# Patient Record
Sex: FEMALE | Race: BLACK OR AFRICAN AMERICAN | NOT HISPANIC OR LATINO | Employment: OTHER | URBAN - METROPOLITAN AREA
[De-identification: names, ages, dates, MRNs, and addresses within clinical notes are randomized per-mention and may not be internally consistent; named-entity substitution may affect disease eponyms.]

---

## 2017-01-19 ENCOUNTER — OUTSIDE FACILITY SERVICE (OUTPATIENT)
Dept: FAMILY MEDICINE CLINIC | Facility: CLINIC | Age: 57
End: 2017-01-19

## 2017-01-19 PROCEDURE — 99307 SBSQ NF CARE SF MDM 10: CPT | Performed by: FAMILY MEDICINE

## 2017-02-14 RX ORDER — HYDROCODONE BITARTRATE AND ACETAMINOPHEN 7.5; 325 MG/1; MG/1
TABLET ORAL
Qty: 180 TABLET | Refills: 0 | Status: SHIPPED | OUTPATIENT
Start: 2017-02-14 | End: 2017-10-02 | Stop reason: DRUGHIGH

## 2017-02-14 RX ORDER — LORAZEPAM 0.5 MG/1
0.5 TABLET ORAL NIGHTLY
Qty: 30 TABLET | Refills: 0 | Status: SHIPPED | OUTPATIENT
Start: 2017-02-14 | End: 2017-04-18 | Stop reason: SDUPTHER

## 2017-03-01 ENCOUNTER — OUTSIDE FACILITY SERVICE (OUTPATIENT)
Dept: FAMILY MEDICINE CLINIC | Facility: CLINIC | Age: 57
End: 2017-03-01

## 2017-03-01 PROCEDURE — 99305 1ST NF CARE MODERATE MDM 35: CPT | Performed by: NURSE PRACTITIONER

## 2017-03-17 ENCOUNTER — OUTSIDE FACILITY SERVICE (OUTPATIENT)
Dept: FAMILY MEDICINE CLINIC | Facility: CLINIC | Age: 57
End: 2017-03-17

## 2017-03-17 PROCEDURE — 99307 SBSQ NF CARE SF MDM 10: CPT | Performed by: NURSE PRACTITIONER

## 2017-04-18 RX ORDER — LORAZEPAM 0.5 MG/1
0.5 TABLET ORAL NIGHTLY
Qty: 30 TABLET | Refills: 1 | Status: SHIPPED | OUTPATIENT
Start: 2017-04-18 | End: 2017-05-09 | Stop reason: SDUPTHER

## 2017-04-20 ENCOUNTER — OUTSIDE FACILITY SERVICE (OUTPATIENT)
Dept: FAMILY MEDICINE CLINIC | Facility: CLINIC | Age: 57
End: 2017-04-20

## 2017-04-20 PROCEDURE — 99308 SBSQ NF CARE LOW MDM 20: CPT | Performed by: FAMILY MEDICINE

## 2017-05-09 RX ORDER — LORAZEPAM 0.5 MG/1
0.5 TABLET ORAL NIGHTLY
Qty: 60 TABLET | Refills: 0 | Status: SHIPPED | OUTPATIENT
Start: 2017-05-09 | End: 2017-05-12 | Stop reason: SDUPTHER

## 2017-05-12 RX ORDER — LORAZEPAM 0.5 MG/1
0.5 TABLET ORAL NIGHTLY
Qty: 30 TABLET | Refills: 5 | Status: SHIPPED | OUTPATIENT
Start: 2017-05-12 | End: 2018-12-26

## 2017-06-16 ENCOUNTER — OUTSIDE FACILITY SERVICE (OUTPATIENT)
Dept: FAMILY MEDICINE CLINIC | Facility: CLINIC | Age: 57
End: 2017-06-16

## 2017-06-16 PROCEDURE — 99309 SBSQ NF CARE MODERATE MDM 30: CPT | Performed by: NURSE PRACTITIONER

## 2017-08-16 ENCOUNTER — OUTSIDE FACILITY SERVICE (OUTPATIENT)
Dept: FAMILY MEDICINE CLINIC | Facility: CLINIC | Age: 57
End: 2017-08-16

## 2017-08-16 PROCEDURE — 99308 SBSQ NF CARE LOW MDM 20: CPT | Performed by: NURSE PRACTITIONER

## 2017-08-18 ENCOUNTER — OUTSIDE FACILITY SERVICE (OUTPATIENT)
Dept: FAMILY MEDICINE CLINIC | Facility: CLINIC | Age: 57
End: 2017-08-18

## 2017-08-18 PROCEDURE — 99308 SBSQ NF CARE LOW MDM 20: CPT | Performed by: NURSE PRACTITIONER

## 2017-09-21 ENCOUNTER — OUTSIDE FACILITY SERVICE (OUTPATIENT)
Dept: FAMILY MEDICINE CLINIC | Facility: CLINIC | Age: 57
End: 2017-09-21

## 2017-09-21 PROCEDURE — 99307 SBSQ NF CARE SF MDM 10: CPT | Performed by: FAMILY MEDICINE

## 2017-10-01 ENCOUNTER — HOSPITAL ENCOUNTER (EMERGENCY)
Facility: HOSPITAL | Age: 57
Discharge: NURSING, REHAB OR LONG-TERM CARE | End: 2017-10-01
Attending: EMERGENCY MEDICINE | Admitting: EMERGENCY MEDICINE

## 2017-10-01 VITALS
BODY MASS INDEX: 28.63 KG/M2 | DIASTOLIC BLOOD PRESSURE: 73 MMHG | SYSTOLIC BLOOD PRESSURE: 110 MMHG | OXYGEN SATURATION: 97 % | TEMPERATURE: 98.4 F | HEIGHT: 70 IN | HEART RATE: 88 BPM | WEIGHT: 200 LBS | RESPIRATION RATE: 15 BRPM

## 2017-10-01 DIAGNOSIS — K59.00 CONSTIPATION, UNSPECIFIED CONSTIPATION TYPE: ICD-10-CM

## 2017-10-01 DIAGNOSIS — K62.89 RECTAL PAIN: Primary | ICD-10-CM

## 2017-10-01 LAB — GLUCOSE BLDC GLUCOMTR-MCNC: 259 MG/DL (ref 70–130)

## 2017-10-01 PROCEDURE — 99283 EMERGENCY DEPT VISIT LOW MDM: CPT

## 2017-10-01 PROCEDURE — 82962 GLUCOSE BLOOD TEST: CPT

## 2017-10-01 NOTE — ED NOTES
"Pt to Room 12 via [facility] from [facility] with c/o \"rectal pain.\"  Per the patient, she does not have any complaints at this time.   When report was received from the facility, they advised the patient had received a soapsuds enema and had three bowel movements.  PT states that she is not in pain at this time, nor does she have any complaints.  Per EMS, patient is at her baseline and does have h/s of CVA.  Pt is alert and oriented X4, PERRLA, respirations are even and unlabored, chest rise and fall is equal in expansion.  Pt does not appear to be in distress at this time.  Pt denies fever, chills, n/v/d, blurred vision, chest pain, abdominal pain, loss in control of bowel/bladder.  Bed in low position, call light within reach, side rails up X2.      Kenia Ramirez RN  10/01/17 0623    "

## 2017-10-01 NOTE — ED NOTES
"Pt repositioned in bed to provide for comfort.  Pt tolerated well.  Patients daughter is at bedside.  Pt asked if she is having pain and/or discomfort at this time, patient stated \"No, not really at all.\"       Kenia Ramirez RN  10/01/17 0704    "

## 2017-10-01 NOTE — ED PROVIDER NOTES
EMERGENCY DEPARTMENT ENCOUNTER    CHIEF COMPLAINT  Chief Complaint: rectal pain  History given by: patient, family  History limited by: nothing  Room Number: 12/12  PMD: Merrill Odonnell MD      HPI:  Pt is a 56 y.o. female who presents complaining of rectal pain, which began earlier today. Pt states that her pain is resolved at this time after having several bowel movements after receiving an enema PTA. Pt's family states that the pt was complaining of pain prior to me entering the room. Pt denies recent illness, abd pain, CP or additional complaint at this time.    Duration:  Several hours  Onset: gradual  Timing: constant  Location: rectal  Radiation: none  Quality: unspecified  Intensity/Severity: moderate  Progression: resolved  Associated Symptoms: constipation  Aggravating Factors: none  Alleviating Factors: enema  Previous Episodes: Pt denies similar symptoms previously.  Treatment before arrival: Pt received an enema PTA, which she states resolved her pain.    PAST MEDICAL HISTORY  Active Ambulatory Problems     Diagnosis Date Noted   • No Active Ambulatory Problems     Resolved Ambulatory Problems     Diagnosis Date Noted   • No Resolved Ambulatory Problems     Past Medical History:   Diagnosis Date   • Acid reflux    • Anemia    • Anxiety    • Chronic constipation    • Chronic pain    • Coronary artery disease    • CVA (cerebral vascular accident) 2011, 2012   • Depression    • Diabetes mellitus    • Dyslipidemia    • Dysphagia    • ESRD (end stage renal disease)    • Flexion contractures    • Hard to intubate    • History of esophagitis    • Hypertension    • Nausea & vomiting    • Venous insufficiency (chronic) (peripheral)        PAST SURGICAL HISTORY  Past Surgical History:   Procedure Laterality Date   • ARTERIOVENOUS FISTULA/SHUNT SURGERY Right 3/21/2016    Procedure: RT BRACHIAL CEPHALIC FISTULA;  Surgeon: Alla Price Jr., MD;  Location: [facility];  Service:    • BREAST BIOPSY          FAMILY HISTORY  History reviewed. No pertinent family history.    SOCIAL HISTORY  Social History     Social History   • Marital status:      Spouse name: N/A   • Number of children: N/A   • Years of education: N/A     Occupational History   • Not on file.     Social History Main Topics   • Smoking status: Former Smoker   • Smokeless tobacco: Former User   • Alcohol use No   • Drug use: No   • Sexual activity: Defer     Other Topics Concern   • Not on file     Social History Narrative       ALLERGIES  Darvon [propoxyphene]    REVIEW OF SYSTEMS  Review of Systems   Constitutional: Negative for fever.   Respiratory: Negative for shortness of breath.    Cardiovascular: Negative for chest pain.   Gastrointestinal: Positive for constipation.        Rectal pain       PHYSICAL EXAM  ED Triage Vitals   Temp Heart Rate Resp BP SpO2   10/01/17 0600 10/01/17 0600 10/01/17 0600 10/01/17 0600 10/01/17 0600   98.4 °F (36.9 °C) 110 18 122/79 98 %      Temp src Heart Rate Source Patient Position BP Location FiO2 (%)   10/01/17 0600 -- -- -- --   Tympanic           Physical Exam   Constitutional: No distress.   HENT:   Head: Normocephalic and atraumatic.   Eyes: Conjunctivae and EOM are normal. Pupils are equal, round, and reactive to light.   Neck: Normal range of motion. Neck supple.   Cardiovascular: Normal rate, regular rhythm and normal heart sounds.    Pulmonary/Chest: Effort normal and breath sounds normal. No respiratory distress.   Abdominal: Soft. There is no tenderness. There is no rebound and no guarding.   Musculoskeletal: Normal range of motion. She exhibits no edema.   AV fistula in RUE with a palpable thrill   Neurological: She is alert. She displays weakness (left hemiparesis, chronic).   Chronic contracture of LUE   Skin: Skin is warm and dry. No rash noted.   Nursing note and vitals reviewed.      PROCEDURES  Procedures      PROGRESS AND CONSULTS  ED Course     0707- D/w pt and family that since the  pt's pain is now resolved, there is no further testing necessary. Discussed the plan to discharge the pt back to the NH. Pt and family agree with the plan and all questions were addressed.      MEDICAL DECISION MAKING  Results were reviewed/discussed with the patient and they were also made aware of online access. Pt also made aware that follow up with PMD is necessary.     MDM  Number of Diagnoses or Management Options     Amount and/or Complexity of Data Reviewed  Decide to obtain previous medical records or to obtain history from someone other than the patient: yes  Obtain history from someone other than the patient: yes (family)  Review and summarize past medical records: yes (Pt has no recent ED visits )    Patient Progress  Patient progress: improved         DIAGNOSIS  Final diagnoses:   Rectal pain, resolved   Constipation, unspecified constipation type, resolved       DISPOSITION  DISCHARGE    Patient discharged in stable condition.    Reviewed implications of results, diagnosis, meds, responsibility to follow up, warning signs and symptoms of possible worsening, potential complications and reasons to return to ER, including fever, worsening pain or any concern.    Patient/Family voiced understanding of above instructions.    Discussed plan for discharge, as there is no emergent indication for admission.  Pt/family is agreeable and understands need for follow up and repeat testing.  Pt is aware that discharge does not mean that nothing is wrong but it indicates no emergency is present that requires admission and they must continue care with follow-up as given below or physician of their choice.     FOLLOW-UP  Merrill Odonnell MD  [address]  [address] [address] [phone]    Call  As needed, If symptoms worsen         Medication List      Notice     No changes were made to your prescriptions during this visit.            Latest Documented Vital Signs:  As of 7:29 AM  BP- 122/79 HR- 110  Temp- 98.4 °F (36.9 °C) (Tympanic) O2 sat- 98%    --  Documentation assistance provided by ana Montero for Dr. Valiente.  Information recorded by the ana was done at my direction and has been verified and validated by me.     Georgie Montero  10/01/17 0731       Merrill Valiente MD  10/02/17 1221

## 2017-10-02 ENCOUNTER — EPISODE CHANGES (OUTPATIENT)
Dept: CASE MANAGEMENT | Facility: OTHER | Age: 57
End: 2017-10-02

## 2017-10-02 RX ORDER — HYDROCODONE BITARTRATE AND ACETAMINOPHEN 7.5; 325 MG/1; MG/1
TABLET ORAL
Qty: 270 TABLET | Refills: 0 | Status: SHIPPED | OUTPATIENT
Start: 2017-10-02 | End: 2017-11-27 | Stop reason: SDUPTHER

## 2017-10-02 NOTE — TELEPHONE ENCOUNTER
Katherine at [facility] called, patient of Carolina Womack [birth year] back from hosp needs new orders for Norco 7.5/325mg 1 po q 4 hrs prn ,and 1 po prior to dialysis on Tue, Thurs, Sat routine be sent into [facility]

## 2017-10-03 ENCOUNTER — EPISODE CHANGES (OUTPATIENT)
Dept: CASE MANAGEMENT | Facility: OTHER | Age: 57
End: 2017-10-03

## 2017-10-25 ENCOUNTER — OUTSIDE FACILITY SERVICE (OUTPATIENT)
Dept: FAMILY MEDICINE CLINIC | Facility: CLINIC | Age: 57
End: 2017-10-25

## 2017-10-25 PROCEDURE — 99308 SBSQ NF CARE LOW MDM 20: CPT | Performed by: NURSE PRACTITIONER

## 2017-11-27 RX ORDER — HYDROCODONE BITARTRATE AND ACETAMINOPHEN 7.5; 325 MG/1; MG/1
TABLET ORAL
Qty: 384 TABLET | Refills: 0 | Status: SHIPPED | OUTPATIENT
Start: 2017-11-27 | End: 2018-01-23 | Stop reason: SDUPTHER

## 2017-12-14 ENCOUNTER — OUTSIDE FACILITY SERVICE (OUTPATIENT)
Dept: FAMILY MEDICINE CLINIC | Facility: CLINIC | Age: 57
End: 2017-12-14

## 2017-12-14 PROCEDURE — 99307 SBSQ NF CARE SF MDM 10: CPT | Performed by: FAMILY MEDICINE

## 2018-01-23 RX ORDER — HYDROCODONE BITARTRATE AND ACETAMINOPHEN 7.5; 325 MG/1; MG/1
TABLET ORAL
Qty: 384 TABLET | Refills: 0 | Status: SHIPPED | OUTPATIENT
Start: 2018-01-23 | End: 2018-03-19 | Stop reason: SDUPTHER

## 2018-01-25 ENCOUNTER — OUTSIDE FACILITY SERVICE (OUTPATIENT)
Dept: INTERNAL MEDICINE | Facility: CLINIC | Age: 58
End: 2018-01-25

## 2018-01-25 PROCEDURE — 99308 SBSQ NF CARE LOW MDM 20: CPT | Performed by: NURSE PRACTITIONER

## 2018-02-28 ENCOUNTER — OUTSIDE FACILITY SERVICE (OUTPATIENT)
Dept: INTERNAL MEDICINE | Facility: CLINIC | Age: 58
End: 2018-02-28

## 2018-02-28 PROCEDURE — 99307 SBSQ NF CARE SF MDM 10: CPT | Performed by: FAMILY MEDICINE

## 2018-03-20 RX ORDER — HYDROCODONE BITARTRATE AND ACETAMINOPHEN 7.5; 325 MG/1; MG/1
TABLET ORAL
Qty: 384 TABLET | Refills: 0 | Status: SHIPPED | OUTPATIENT
Start: 2018-03-20 | End: 2018-05-14 | Stop reason: SDUPTHER

## 2018-03-30 ENCOUNTER — EPISODE CHANGES (OUTPATIENT)
Dept: CASE MANAGEMENT | Facility: OTHER | Age: 58
End: 2018-03-30

## 2018-04-06 ENCOUNTER — OUTSIDE FACILITY SERVICE (OUTPATIENT)
Dept: INTERNAL MEDICINE | Facility: CLINIC | Age: 58
End: 2018-04-06

## 2018-04-06 PROCEDURE — 99318 PR E/M ANNUAL NURSING FACILITY ASSESS STABLE 30 MIN: CPT | Performed by: NURSE PRACTITIONER

## 2018-05-09 ENCOUNTER — OUTSIDE FACILITY SERVICE (OUTPATIENT)
Dept: INTERNAL MEDICINE | Facility: CLINIC | Age: 58
End: 2018-05-09

## 2018-05-09 PROCEDURE — 99307 SBSQ NF CARE SF MDM 10: CPT | Performed by: FAMILY MEDICINE

## 2018-05-15 RX ORDER — HYDROCODONE BITARTRATE AND ACETAMINOPHEN 7.5; 325 MG/1; MG/1
TABLET ORAL
Qty: 384 TABLET | Refills: 0 | Status: SHIPPED | OUTPATIENT
Start: 2018-05-15 | End: 2018-07-09 | Stop reason: SDUPTHER

## 2018-06-07 ENCOUNTER — OUTSIDE FACILITY SERVICE (OUTPATIENT)
Dept: INTERNAL MEDICINE | Facility: CLINIC | Age: 58
End: 2018-06-07

## 2018-06-07 PROCEDURE — 99308 SBSQ NF CARE LOW MDM 20: CPT | Performed by: NURSE PRACTITIONER

## 2018-06-15 ENCOUNTER — EPISODE CHANGES (OUTPATIENT)
Dept: CASE MANAGEMENT | Facility: OTHER | Age: 58
End: 2018-06-15

## 2018-07-10 RX ORDER — HYDROCODONE BITARTRATE AND ACETAMINOPHEN 7.5; 325 MG/1; MG/1
TABLET ORAL
Qty: 384 TABLET | Refills: 0 | Status: SHIPPED | OUTPATIENT
Start: 2018-07-10 | End: 2018-09-04 | Stop reason: SDUPTHER

## 2018-07-18 ENCOUNTER — OUTSIDE FACILITY SERVICE (OUTPATIENT)
Dept: INTERNAL MEDICINE | Facility: CLINIC | Age: 58
End: 2018-07-18

## 2018-07-18 PROCEDURE — 99307 SBSQ NF CARE SF MDM 10: CPT | Performed by: FAMILY MEDICINE

## 2018-09-04 RX ORDER — HYDROCODONE BITARTRATE AND ACETAMINOPHEN 7.5; 325 MG/1; MG/1
TABLET ORAL
Qty: 384 TABLET | Refills: 0 | Status: SHIPPED | OUTPATIENT
Start: 2018-09-04 | End: 2018-10-15 | Stop reason: SDUPTHER

## 2018-10-10 ENCOUNTER — OUTSIDE FACILITY SERVICE (OUTPATIENT)
Dept: INTERNAL MEDICINE | Facility: CLINIC | Age: 58
End: 2018-10-10

## 2018-10-10 PROCEDURE — 99307 SBSQ NF CARE SF MDM 10: CPT | Performed by: FAMILY MEDICINE

## 2018-10-11 ENCOUNTER — OUTSIDE FACILITY SERVICE (OUTPATIENT)
Dept: INTERNAL MEDICINE | Facility: CLINIC | Age: 58
End: 2018-10-11

## 2018-10-11 PROCEDURE — 99308 SBSQ NF CARE LOW MDM 20: CPT | Performed by: NURSE PRACTITIONER

## 2018-10-16 RX ORDER — HYDROCODONE BITARTRATE AND ACETAMINOPHEN 7.5; 325 MG/1; MG/1
TABLET ORAL
Qty: 384 TABLET | Refills: 0 | Status: SHIPPED | OUTPATIENT
Start: 2018-10-16 | End: 2018-12-26

## 2018-12-06 ENCOUNTER — OUTSIDE FACILITY SERVICE (OUTPATIENT)
Dept: INTERNAL MEDICINE | Facility: CLINIC | Age: 58
End: 2018-12-06

## 2018-12-06 PROCEDURE — 99309 SBSQ NF CARE MODERATE MDM 30: CPT | Performed by: NURSE PRACTITIONER

## 2018-12-26 ENCOUNTER — APPOINTMENT (OUTPATIENT)
Dept: GENERAL RADIOLOGY | Facility: HOSPITAL | Age: 58
End: 2018-12-26

## 2018-12-26 ENCOUNTER — APPOINTMENT (OUTPATIENT)
Dept: GENERAL RADIOLOGY | Facility: HOSPITAL | Age: 58
End: 2018-12-26
Attending: INTERNAL MEDICINE

## 2018-12-26 ENCOUNTER — APPOINTMENT (OUTPATIENT)
Dept: CT IMAGING | Facility: HOSPITAL | Age: 58
End: 2018-12-26

## 2018-12-26 ENCOUNTER — HOSPITAL ENCOUNTER (INPATIENT)
Facility: HOSPITAL | Age: 58
LOS: 7 days | Discharge: NURSING, REHAB OR LONG-TERM CARE | End: 2019-01-02
Attending: EMERGENCY MEDICINE | Admitting: INTERNAL MEDICINE

## 2018-12-26 DIAGNOSIS — Z99.2 ESRD ON HEMODIALYSIS (HCC): ICD-10-CM

## 2018-12-26 DIAGNOSIS — R41.82 ALTERED MENTAL STATUS, UNSPECIFIED ALTERED MENTAL STATUS TYPE: Primary | ICD-10-CM

## 2018-12-26 DIAGNOSIS — N18.6 ESRD ON HEMODIALYSIS (HCC): ICD-10-CM

## 2018-12-26 PROBLEM — Z74.09 IMMOBILITY: Status: ACTIVE | Noted: 2018-12-26

## 2018-12-26 PROBLEM — L98.429 SACRAL ULCER (HCC): Status: ACTIVE | Noted: 2018-12-26

## 2018-12-26 PROBLEM — E66.01 MORBID OBESITY (HCC): Status: ACTIVE | Noted: 2018-12-26

## 2018-12-26 LAB
AMMONIA BLD-SCNC: 31 UMOL/L (ref 11–51)
ANION GAP SERPL CALCULATED.3IONS-SCNC: 15.5 MMOL/L
BACTERIA UR QL AUTO: ABNORMAL /HPF
BASOPHILS # BLD AUTO: 0.01 10*3/MM3 (ref 0–0.2)
BASOPHILS NFR BLD AUTO: 0.2 % (ref 0–1.5)
BILIRUB UR QL STRIP: NEGATIVE
BUN BLD-MCNC: 38 MG/DL (ref 6–20)
BUN/CREAT SERPL: 8.5 (ref 7–25)
CALCIUM SPEC-SCNC: 8.8 MG/DL (ref 8.6–10.5)
CHLORIDE SERPL-SCNC: 92 MMOL/L (ref 98–107)
CLARITY UR: CLEAR
CO2 SERPL-SCNC: 30.5 MMOL/L (ref 22–29)
COLOR UR: YELLOW
CREAT BLD-MCNC: 4.47 MG/DL (ref 0.57–1)
DEPRECATED RDW RBC AUTO: 55.8 FL (ref 37–54)
EOSINOPHIL # BLD AUTO: 0.05 10*3/MM3 (ref 0–0.7)
EOSINOPHIL NFR BLD AUTO: 0.9 % (ref 0.3–6.2)
ERYTHROCYTE [DISTWIDTH] IN BLOOD BY AUTOMATED COUNT: 17.1 % (ref 11.7–13)
GFR SERPL CREATININE-BSD FRML MDRD: 12 ML/MIN/1.73
GFR SERPL CREATININE-BSD FRML MDRD: ABNORMAL ML/MIN/1.73
GLUCOSE BLD-MCNC: 179 MG/DL (ref 65–99)
GLUCOSE BLDC GLUCOMTR-MCNC: 143 MG/DL (ref 70–130)
GLUCOSE UR STRIP-MCNC: ABNORMAL MG/DL
HCT VFR BLD AUTO: 38.1 % (ref 35.6–45.5)
HGB BLD-MCNC: 12.8 G/DL (ref 11.9–15.5)
HGB UR QL STRIP.AUTO: NEGATIVE
HYALINE CASTS UR QL AUTO: ABNORMAL /LPF
IMM GRANULOCYTES # BLD AUTO: 0.01 10*3/MM3 (ref 0–0.03)
IMM GRANULOCYTES NFR BLD AUTO: 0.2 % (ref 0–0.5)
KETONES UR QL STRIP: ABNORMAL
LEUKOCYTE ESTERASE UR QL STRIP.AUTO: ABNORMAL
LYMPHOCYTES # BLD AUTO: 1.72 10*3/MM3 (ref 0.9–4.8)
LYMPHOCYTES NFR BLD AUTO: 30 % (ref 19.6–45.3)
MCH RBC QN AUTO: 29.8 PG (ref 26.9–32)
MCHC RBC AUTO-ENTMCNC: 33.6 G/DL (ref 32.4–36.3)
MCV RBC AUTO: 88.8 FL (ref 80.5–98.2)
MONOCYTES # BLD AUTO: 0.37 10*3/MM3 (ref 0.2–1.2)
MONOCYTES NFR BLD AUTO: 6.5 % (ref 5–12)
NEUTROPHILS # BLD AUTO: 3.58 10*3/MM3 (ref 1.9–8.1)
NEUTROPHILS NFR BLD AUTO: 62.4 % (ref 42.7–76)
NITRITE UR QL STRIP: NEGATIVE
PH UR STRIP.AUTO: 8 [PH] (ref 5–8)
PHOSPHATE SERPL-MCNC: 3.6 MG/DL (ref 2.5–4.5)
PLATELET # BLD AUTO: 197 10*3/MM3 (ref 140–500)
PMV BLD AUTO: 10.3 FL (ref 6–12)
POTASSIUM BLD-SCNC: 4.1 MMOL/L (ref 3.5–5.2)
PROT UR QL STRIP: ABNORMAL
RBC # BLD AUTO: 4.29 10*6/MM3 (ref 3.9–5.2)
RBC # UR: ABNORMAL /HPF
REF LAB TEST METHOD: ABNORMAL
SODIUM BLD-SCNC: 138 MMOL/L (ref 136–145)
SP GR UR STRIP: 1.02 (ref 1–1.03)
SQUAMOUS #/AREA URNS HPF: ABNORMAL /HPF
UROBILINOGEN UR QL STRIP: ABNORMAL
VALPROATE SERPL-MCNC: <2.8 MCG/ML (ref 50–125)
WBC NRBC COR # BLD: 5.73 10*3/MM3 (ref 4.5–10.7)
WBC UR QL AUTO: ABNORMAL /HPF

## 2018-12-26 PROCEDURE — 71045 X-RAY EXAM CHEST 1 VIEW: CPT

## 2018-12-26 PROCEDURE — 82140 ASSAY OF AMMONIA: CPT | Performed by: EMERGENCY MEDICINE

## 2018-12-26 PROCEDURE — 80164 ASSAY DIPROPYLACETIC ACD TOT: CPT | Performed by: EMERGENCY MEDICINE

## 2018-12-26 PROCEDURE — 80048 BASIC METABOLIC PNL TOTAL CA: CPT | Performed by: EMERGENCY MEDICINE

## 2018-12-26 PROCEDURE — 25010000002 PIPERACILLIN SOD-TAZOBACTAM PER 1 G: Performed by: INTERNAL MEDICINE

## 2018-12-26 PROCEDURE — 36415 COLL VENOUS BLD VENIPUNCTURE: CPT | Performed by: INTERNAL MEDICINE

## 2018-12-26 PROCEDURE — 70450 CT HEAD/BRAIN W/O DYE: CPT

## 2018-12-26 PROCEDURE — 87040 BLOOD CULTURE FOR BACTERIA: CPT | Performed by: INTERNAL MEDICINE

## 2018-12-26 PROCEDURE — 99285 EMERGENCY DEPT VISIT HI MDM: CPT

## 2018-12-26 PROCEDURE — 81001 URINALYSIS AUTO W/SCOPE: CPT | Performed by: EMERGENCY MEDICINE

## 2018-12-26 PROCEDURE — 85025 COMPLETE CBC W/AUTO DIFF WBC: CPT | Performed by: EMERGENCY MEDICINE

## 2018-12-26 PROCEDURE — 84100 ASSAY OF PHOSPHORUS: CPT | Performed by: EMERGENCY MEDICINE

## 2018-12-26 PROCEDURE — P9612 CATHETERIZE FOR URINE SPEC: HCPCS

## 2018-12-26 PROCEDURE — 82962 GLUCOSE BLOOD TEST: CPT

## 2018-12-26 RX ORDER — ACETAMINOPHEN 500 MG
500 TABLET ORAL EVERY 6 HOURS PRN
COMMUNITY

## 2018-12-26 RX ORDER — CARVEDILOL 12.5 MG/1
12.5 TABLET ORAL 2 TIMES DAILY WITH MEALS
Status: ON HOLD | COMMUNITY
End: 2019-01-26 | Stop reason: SDUPTHER

## 2018-12-26 RX ORDER — MIRTAZAPINE 15 MG/1
15 TABLET, FILM COATED ORAL NIGHTLY
Status: ON HOLD | COMMUNITY
End: 2019-01-23

## 2018-12-26 RX ORDER — NICOTINE POLACRILEX 4 MG
15 LOZENGE BUCCAL
Status: DISCONTINUED | OUTPATIENT
Start: 2018-12-26 | End: 2019-01-02 | Stop reason: HOSPADM

## 2018-12-26 RX ORDER — ONDANSETRON 4 MG/1
2 TABLET, FILM COATED ORAL EVERY 8 HOURS PRN
COMMUNITY

## 2018-12-26 RX ORDER — SODIUM CHLORIDE 0.9 % (FLUSH) 0.9 %
3 SYRINGE (ML) INJECTION EVERY 12 HOURS SCHEDULED
Status: DISCONTINUED | OUTPATIENT
Start: 2018-12-27 | End: 2019-01-02 | Stop reason: HOSPADM

## 2018-12-26 RX ORDER — HYDROCODONE BITARTRATE AND ACETAMINOPHEN 7.5; 325 MG/1; MG/1
1 TABLET ORAL 3 TIMES WEEKLY
COMMUNITY
End: 2019-01-02 | Stop reason: HOSPADM

## 2018-12-26 RX ORDER — NYSTATIN 10B UNIT
POWDER (EA) MISCELLANEOUS
COMMUNITY
End: 2018-12-26

## 2018-12-26 RX ORDER — HYDROCODONE BITARTRATE AND ACETAMINOPHEN 7.5; 325 MG/1; MG/1
1 TABLET ORAL EVERY 4 HOURS PRN
COMMUNITY
End: 2019-01-02 | Stop reason: HOSPADM

## 2018-12-26 RX ORDER — SEVELAMER CARBONATE 800 MG/1
800 TABLET, FILM COATED ORAL
COMMUNITY

## 2018-12-26 RX ORDER — ATORVASTATIN CALCIUM 20 MG/1
60 TABLET, FILM COATED ORAL NIGHTLY
COMMUNITY

## 2018-12-26 RX ORDER — ACETAMINOPHEN 650 MG/1
650 SUPPOSITORY RECTAL EVERY 4 HOURS PRN
Status: DISCONTINUED | OUTPATIENT
Start: 2018-12-26 | End: 2019-01-02 | Stop reason: HOSPADM

## 2018-12-26 RX ORDER — BISACODYL 10 MG
10 SUPPOSITORY, RECTAL RECTAL DAILY PRN
COMMUNITY

## 2018-12-26 RX ORDER — MULTIPLE VITAMINS W/ MINERALS TAB 9MG-400MCG
1 TAB ORAL DAILY
COMMUNITY

## 2018-12-26 RX ORDER — SODIUM CHLORIDE 0.9 % (FLUSH) 0.9 %
10 SYRINGE (ML) INJECTION AS NEEDED
Status: DISCONTINUED | OUTPATIENT
Start: 2018-12-26 | End: 2019-01-02 | Stop reason: HOSPADM

## 2018-12-26 RX ORDER — DEXTROSE MONOHYDRATE 25 G/50ML
25 INJECTION, SOLUTION INTRAVENOUS
Status: DISCONTINUED | OUTPATIENT
Start: 2018-12-26 | End: 2019-01-02 | Stop reason: HOSPADM

## 2018-12-26 RX ORDER — HYDRALAZINE HYDROCHLORIDE 100 MG/1
100 TABLET, FILM COATED ORAL 3 TIMES DAILY
COMMUNITY
End: 2019-01-02 | Stop reason: HOSPADM

## 2018-12-26 RX ORDER — ASPIRIN 300 MG/1
300 SUPPOSITORY RECTAL DAILY
Status: DISCONTINUED | OUTPATIENT
Start: 2018-12-27 | End: 2019-01-02 | Stop reason: HOSPADM

## 2018-12-26 RX ORDER — SODIUM CHLORIDE 0.9 % (FLUSH) 0.9 %
3-10 SYRINGE (ML) INJECTION AS NEEDED
Status: DISCONTINUED | OUTPATIENT
Start: 2018-12-26 | End: 2019-01-02 | Stop reason: HOSPADM

## 2018-12-26 RX ORDER — ASPIRIN 325 MG
325 TABLET ORAL DAILY
Status: DISCONTINUED | OUTPATIENT
Start: 2018-12-27 | End: 2019-01-02 | Stop reason: HOSPADM

## 2018-12-26 RX ORDER — BUPROPION HYDROCHLORIDE 100 MG/1
100 TABLET, EXTENDED RELEASE ORAL 2 TIMES DAILY
COMMUNITY
End: 2019-01-26 | Stop reason: HOSPADM

## 2018-12-26 RX ORDER — ATORVASTATIN CALCIUM 80 MG/1
80 TABLET, FILM COATED ORAL NIGHTLY
Status: DISCONTINUED | OUTPATIENT
Start: 2018-12-27 | End: 2019-01-02 | Stop reason: HOSPADM

## 2018-12-26 RX ORDER — NYSTATIN 100000 [USP'U]/G
1 POWDER TOPICAL 2 TIMES DAILY
COMMUNITY
End: 2019-01-02 | Stop reason: HOSPADM

## 2018-12-26 RX ADMIN — TAZOBACTAM SODIUM AND PIPERACILLIN SODIUM 3.38 G: 375; 3 INJECTION, SOLUTION INTRAVENOUS at 22:20

## 2018-12-27 ENCOUNTER — APPOINTMENT (OUTPATIENT)
Dept: MRI IMAGING | Facility: HOSPITAL | Age: 58
End: 2018-12-27

## 2018-12-27 ENCOUNTER — APPOINTMENT (OUTPATIENT)
Dept: NEUROLOGY | Facility: HOSPITAL | Age: 58
End: 2018-12-27
Attending: PSYCHIATRY & NEUROLOGY

## 2018-12-27 LAB
ANION GAP SERPL CALCULATED.3IONS-SCNC: 15.6 MMOL/L
BASOPHILS # BLD AUTO: 0.02 10*3/MM3 (ref 0–0.2)
BASOPHILS NFR BLD AUTO: 0.4 % (ref 0–1.5)
BUN BLD-MCNC: 43 MG/DL (ref 6–20)
BUN/CREAT SERPL: 8.7 (ref 7–25)
CALCIUM SPEC-SCNC: 8.9 MG/DL (ref 8.6–10.5)
CHLORIDE SERPL-SCNC: 95 MMOL/L (ref 98–107)
CHOLEST SERPL-MCNC: 139 MG/DL (ref 0–200)
CO2 SERPL-SCNC: 26.4 MMOL/L (ref 22–29)
CREAT BLD-MCNC: 4.97 MG/DL (ref 0.57–1)
DEPRECATED RDW RBC AUTO: 56.7 FL (ref 37–54)
EOSINOPHIL # BLD AUTO: 0.05 10*3/MM3 (ref 0–0.7)
EOSINOPHIL NFR BLD AUTO: 1 % (ref 0.3–6.2)
ERYTHROCYTE [DISTWIDTH] IN BLOOD BY AUTOMATED COUNT: 17.3 % (ref 11.7–13)
FOLATE SERPL-MCNC: >20 NG/ML (ref 4.78–24.2)
GFR SERPL CREATININE-BSD FRML MDRD: 11 ML/MIN/1.73
GFR SERPL CREATININE-BSD FRML MDRD: ABNORMAL ML/MIN/1.73
GLUCOSE BLD-MCNC: 140 MG/DL (ref 65–99)
GLUCOSE BLDC GLUCOMTR-MCNC: 144 MG/DL (ref 70–130)
GLUCOSE BLDC GLUCOMTR-MCNC: 145 MG/DL (ref 70–130)
GLUCOSE BLDC GLUCOMTR-MCNC: 172 MG/DL (ref 70–130)
GLUCOSE BLDC GLUCOMTR-MCNC: 193 MG/DL (ref 70–130)
HBA1C MFR BLD: 7.3 % (ref 4.8–5.6)
HCT VFR BLD AUTO: 37.5 % (ref 35.6–45.5)
HDLC SERPL-MCNC: 26 MG/DL (ref 40–60)
HGB BLD-MCNC: 12.4 G/DL (ref 11.9–15.5)
IMM GRANULOCYTES # BLD AUTO: 0.01 10*3/MM3 (ref 0–0.03)
IMM GRANULOCYTES NFR BLD AUTO: 0.2 % (ref 0–0.5)
LDLC SERPL CALC-MCNC: 67 MG/DL (ref 0–100)
LDLC/HDLC SERPL: 2.58 {RATIO}
LYMPHOCYTES # BLD AUTO: 1.73 10*3/MM3 (ref 0.9–4.8)
LYMPHOCYTES NFR BLD AUTO: 34.5 % (ref 19.6–45.3)
MCH RBC QN AUTO: 29.5 PG (ref 26.9–32)
MCHC RBC AUTO-ENTMCNC: 33.1 G/DL (ref 32.4–36.3)
MCV RBC AUTO: 89.1 FL (ref 80.5–98.2)
MONOCYTES # BLD AUTO: 0.42 10*3/MM3 (ref 0.2–1.2)
MONOCYTES NFR BLD AUTO: 8.4 % (ref 5–12)
NEUTROPHILS # BLD AUTO: 2.79 10*3/MM3 (ref 1.9–8.1)
NEUTROPHILS NFR BLD AUTO: 55.7 % (ref 42.7–76)
PLATELET # BLD AUTO: 202 10*3/MM3 (ref 140–500)
PMV BLD AUTO: 10.4 FL (ref 6–12)
POTASSIUM BLD-SCNC: 3.8 MMOL/L (ref 3.5–5.2)
RBC # BLD AUTO: 4.21 10*6/MM3 (ref 3.9–5.2)
SODIUM BLD-SCNC: 137 MMOL/L (ref 136–145)
TRIGL SERPL-MCNC: 229 MG/DL (ref 0–150)
TSH SERPL DL<=0.05 MIU/L-ACNC: 0.82 MIU/ML (ref 0.27–4.2)
VIT B12 BLD-MCNC: 856 PG/ML (ref 211–946)
VLDLC SERPL-MCNC: 45.8 MG/DL (ref 5–40)
WBC NRBC COR # BLD: 5.01 10*3/MM3 (ref 4.5–10.7)

## 2018-12-27 PROCEDURE — 92610 EVALUATE SWALLOWING FUNCTION: CPT

## 2018-12-27 PROCEDURE — 82962 GLUCOSE BLOOD TEST: CPT

## 2018-12-27 PROCEDURE — 70551 MRI BRAIN STEM W/O DYE: CPT

## 2018-12-27 PROCEDURE — 80061 LIPID PANEL: CPT | Performed by: INTERNAL MEDICINE

## 2018-12-27 PROCEDURE — 83036 HEMOGLOBIN GLYCOSYLATED A1C: CPT | Performed by: INTERNAL MEDICINE

## 2018-12-27 PROCEDURE — 82607 VITAMIN B-12: CPT | Performed by: INTERNAL MEDICINE

## 2018-12-27 PROCEDURE — 85025 COMPLETE CBC W/AUTO DIFF WBC: CPT | Performed by: INTERNAL MEDICINE

## 2018-12-27 PROCEDURE — 95816 EEG AWAKE AND DROWSY: CPT

## 2018-12-27 PROCEDURE — 63710000001 INSULIN LISPRO (HUMAN) PER 5 UNITS: Performed by: INTERNAL MEDICINE

## 2018-12-27 PROCEDURE — 80048 BASIC METABOLIC PNL TOTAL CA: CPT | Performed by: INTERNAL MEDICINE

## 2018-12-27 PROCEDURE — 84443 ASSAY THYROID STIM HORMONE: CPT | Performed by: INTERNAL MEDICINE

## 2018-12-27 PROCEDURE — 82746 ASSAY OF FOLIC ACID SERUM: CPT | Performed by: INTERNAL MEDICINE

## 2018-12-27 PROCEDURE — 99222 1ST HOSP IP/OBS MODERATE 55: CPT | Performed by: PSYCHIATRY & NEUROLOGY

## 2018-12-27 PROCEDURE — 25010000002 PIPERACILLIN SOD-TAZOBACTAM PER 1 G: Performed by: INTERNAL MEDICINE

## 2018-12-27 PROCEDURE — 95816 EEG AWAKE AND DROWSY: CPT | Performed by: PSYCHIATRY & NEUROLOGY

## 2018-12-27 RX ORDER — ATORVASTATIN CALCIUM 20 MG/1
60 TABLET, FILM COATED ORAL DAILY
Status: DISCONTINUED | OUTPATIENT
Start: 2018-12-27 | End: 2018-12-28

## 2018-12-27 RX ORDER — POLYETHYLENE GLYCOL 3350 17 G/17G
17 POWDER, FOR SOLUTION ORAL DAILY
Status: DISCONTINUED | OUTPATIENT
Start: 2018-12-27 | End: 2019-01-02 | Stop reason: HOSPADM

## 2018-12-27 RX ORDER — CARVEDILOL 12.5 MG/1
12.5 TABLET ORAL 2 TIMES DAILY WITH MEALS
Status: DISCONTINUED | OUTPATIENT
Start: 2018-12-27 | End: 2019-01-02 | Stop reason: HOSPADM

## 2018-12-27 RX ORDER — ONDANSETRON 4 MG/1
2 TABLET, FILM COATED ORAL EVERY 8 HOURS PRN
Status: DISCONTINUED | OUTPATIENT
Start: 2018-12-27 | End: 2019-01-02 | Stop reason: HOSPADM

## 2018-12-27 RX ORDER — AMLODIPINE BESYLATE 10 MG/1
10 TABLET ORAL DAILY
Status: DISCONTINUED | OUTPATIENT
Start: 2018-12-27 | End: 2018-12-28

## 2018-12-27 RX ORDER — HYDRALAZINE HYDROCHLORIDE 50 MG/1
100 TABLET, FILM COATED ORAL 3 TIMES DAILY
Status: DISCONTINUED | OUTPATIENT
Start: 2018-12-27 | End: 2018-12-28

## 2018-12-27 RX ORDER — SEVELAMER CARBONATE 800 MG/1
800 TABLET, FILM COATED ORAL
Status: DISCONTINUED | OUTPATIENT
Start: 2018-12-28 | End: 2019-01-02 | Stop reason: HOSPADM

## 2018-12-27 RX ORDER — LACTULOSE 10 G/15ML
20 SOLUTION ORAL 2 TIMES DAILY
Status: DISCONTINUED | OUTPATIENT
Start: 2018-12-27 | End: 2019-01-02 | Stop reason: HOSPADM

## 2018-12-27 RX ORDER — INSULIN GLARGINE 100 [IU]/ML
18 INJECTION, SOLUTION SUBCUTANEOUS EVERY MORNING
Status: DISCONTINUED | OUTPATIENT
Start: 2018-12-28 | End: 2019-01-02 | Stop reason: HOSPADM

## 2018-12-27 RX ORDER — TERAZOSIN 5 MG/1
5 CAPSULE ORAL NIGHTLY
Status: DISCONTINUED | OUTPATIENT
Start: 2018-12-27 | End: 2018-12-28

## 2018-12-27 RX ORDER — CLONIDINE HYDROCHLORIDE 0.1 MG/1
0.2 TABLET ORAL EVERY 12 HOURS SCHEDULED
Status: DISCONTINUED | OUTPATIENT
Start: 2018-12-27 | End: 2018-12-28

## 2018-12-27 RX ADMIN — DOCUSATE SODIUM 250 MG: 50 CAPSULE, LIQUID FILLED ORAL at 21:15

## 2018-12-27 RX ADMIN — LACTULOSE 20 G: 10 SOLUTION ORAL at 21:15

## 2018-12-27 RX ADMIN — ACETAMINOPHEN 650 MG: 650 SUPPOSITORY RECTAL at 19:03

## 2018-12-27 RX ADMIN — ASPIRIN 300 MG: 300 SUPPOSITORY RECTAL at 10:53

## 2018-12-27 RX ADMIN — SODIUM CHLORIDE, PRESERVATIVE FREE 3 ML: 5 INJECTION INTRAVENOUS at 00:54

## 2018-12-27 RX ADMIN — SODIUM CHLORIDE, PRESERVATIVE FREE 3 ML: 5 INJECTION INTRAVENOUS at 21:15

## 2018-12-27 RX ADMIN — TAZOBACTAM SODIUM AND PIPERACILLIN SODIUM 3.38 G: 375; 3 INJECTION, SOLUTION INTRAVENOUS at 05:26

## 2018-12-27 RX ADMIN — ATORVASTATIN CALCIUM 80 MG: 80 TABLET, FILM COATED ORAL at 21:15

## 2018-12-27 RX ADMIN — CARVEDILOL 12.5 MG: 12.5 TABLET, FILM COATED ORAL at 21:15

## 2018-12-27 RX ADMIN — TAZOBACTAM SODIUM AND PIPERACILLIN SODIUM 3.38 G: 375; 3 INJECTION, SOLUTION INTRAVENOUS at 19:03

## 2018-12-27 RX ADMIN — INSULIN LISPRO 2 UNITS: 100 INJECTION, SOLUTION INTRAVENOUS; SUBCUTANEOUS at 21:15

## 2018-12-27 RX ADMIN — SODIUM CHLORIDE, PRESERVATIVE FREE 3 ML: 5 INJECTION INTRAVENOUS at 10:53

## 2018-12-27 RX ADMIN — POLYETHYLENE GLYCOL 3350 17 G: 17 POWDER, FOR SOLUTION ORAL at 21:15

## 2018-12-28 LAB
ANION GAP SERPL CALCULATED.3IONS-SCNC: 18.7 MMOL/L
BASOPHILS # BLD AUTO: 0.01 10*3/MM3 (ref 0–0.2)
BASOPHILS NFR BLD AUTO: 0.1 % (ref 0–1.5)
BUN BLD-MCNC: 54 MG/DL (ref 6–20)
BUN/CREAT SERPL: 9.2 (ref 7–25)
CALCIUM SPEC-SCNC: 8.4 MG/DL (ref 8.6–10.5)
CHLORIDE SERPL-SCNC: 97 MMOL/L (ref 98–107)
CO2 SERPL-SCNC: 23.3 MMOL/L (ref 22–29)
CREAT BLD-MCNC: 5.89 MG/DL (ref 0.57–1)
DEPRECATED RDW RBC AUTO: 57.1 FL (ref 37–54)
EOSINOPHIL # BLD AUTO: 0.11 10*3/MM3 (ref 0–0.7)
EOSINOPHIL NFR BLD AUTO: 1.6 % (ref 0.3–6.2)
ERYTHROCYTE [DISTWIDTH] IN BLOOD BY AUTOMATED COUNT: 17.4 % (ref 11.7–13)
GFR SERPL CREATININE-BSD FRML MDRD: 9 ML/MIN/1.73
GFR SERPL CREATININE-BSD FRML MDRD: ABNORMAL ML/MIN/1.73
GLUCOSE BLD-MCNC: 114 MG/DL (ref 65–99)
GLUCOSE BLDC GLUCOMTR-MCNC: 134 MG/DL (ref 70–130)
GLUCOSE BLDC GLUCOMTR-MCNC: 174 MG/DL (ref 70–130)
GLUCOSE BLDC GLUCOMTR-MCNC: 209 MG/DL (ref 70–130)
HCT VFR BLD AUTO: 37 % (ref 35.6–45.5)
HGB BLD-MCNC: 12.2 G/DL (ref 11.9–15.5)
IMM GRANULOCYTES # BLD AUTO: 0.01 10*3/MM3 (ref 0–0.03)
IMM GRANULOCYTES NFR BLD AUTO: 0.1 % (ref 0–0.5)
LYMPHOCYTES # BLD AUTO: 2.16 10*3/MM3 (ref 0.9–4.8)
LYMPHOCYTES NFR BLD AUTO: 31 % (ref 19.6–45.3)
MCH RBC QN AUTO: 29.5 PG (ref 26.9–32)
MCHC RBC AUTO-ENTMCNC: 33 G/DL (ref 32.4–36.3)
MCV RBC AUTO: 89.4 FL (ref 80.5–98.2)
MONOCYTES # BLD AUTO: 0.61 10*3/MM3 (ref 0.2–1.2)
MONOCYTES NFR BLD AUTO: 8.8 % (ref 5–12)
NEUTROPHILS # BLD AUTO: 4.08 10*3/MM3 (ref 1.9–8.1)
NEUTROPHILS NFR BLD AUTO: 58.5 % (ref 42.7–76)
PLATELET # BLD AUTO: 182 10*3/MM3 (ref 140–500)
PMV BLD AUTO: 10.4 FL (ref 6–12)
POTASSIUM BLD-SCNC: 4.1 MMOL/L (ref 3.5–5.2)
RBC # BLD AUTO: 4.14 10*6/MM3 (ref 3.9–5.2)
SODIUM BLD-SCNC: 139 MMOL/L (ref 136–145)
WBC NRBC COR # BLD: 6.97 10*3/MM3 (ref 4.5–10.7)

## 2018-12-28 PROCEDURE — 25010000002 PIPERACILLIN SOD-TAZOBACTAM PER 1 G: Performed by: INTERNAL MEDICINE

## 2018-12-28 PROCEDURE — 63710000001 INSULIN LISPRO (HUMAN) PER 5 UNITS: Performed by: INTERNAL MEDICINE

## 2018-12-28 PROCEDURE — 63710000001 INSULIN GLARGINE PER 5 UNITS: Performed by: HOSPITALIST

## 2018-12-28 PROCEDURE — 80048 BASIC METABOLIC PNL TOTAL CA: CPT | Performed by: HOSPITALIST

## 2018-12-28 PROCEDURE — 5A1D70Z PERFORMANCE OF URINARY FILTRATION, INTERMITTENT, LESS THAN 6 HOURS PER DAY: ICD-10-PCS | Performed by: INTERNAL MEDICINE

## 2018-12-28 PROCEDURE — 85025 COMPLETE CBC W/AUTO DIFF WBC: CPT | Performed by: HOSPITALIST

## 2018-12-28 PROCEDURE — 99233 SBSQ HOSP IP/OBS HIGH 50: CPT | Performed by: NURSE PRACTITIONER

## 2018-12-28 PROCEDURE — 82962 GLUCOSE BLOOD TEST: CPT

## 2018-12-28 RX ORDER — HYDROCODONE BITARTRATE AND ACETAMINOPHEN 7.5; 325 MG/1; MG/1
1 TABLET ORAL EVERY 6 HOURS PRN
Status: DISCONTINUED | OUTPATIENT
Start: 2018-12-28 | End: 2019-01-02 | Stop reason: HOSPADM

## 2018-12-28 RX ADMIN — LACTULOSE 20 G: 10 SOLUTION ORAL at 19:58

## 2018-12-28 RX ADMIN — TAZOBACTAM SODIUM AND PIPERACILLIN SODIUM 3.38 G: 375; 3 INJECTION, SOLUTION INTRAVENOUS at 05:38

## 2018-12-28 RX ADMIN — INSULIN GLARGINE 18 UNITS: 100 INJECTION, SOLUTION SUBCUTANEOUS at 08:38

## 2018-12-28 RX ADMIN — ASPIRIN 325 MG: 325 TABLET ORAL at 08:32

## 2018-12-28 RX ADMIN — CARVEDILOL 12.5 MG: 12.5 TABLET, FILM COATED ORAL at 20:02

## 2018-12-28 RX ADMIN — INSULIN LISPRO 2 UNITS: 100 INJECTION, SOLUTION INTRAVENOUS; SUBCUTANEOUS at 21:45

## 2018-12-28 RX ADMIN — SODIUM CHLORIDE, PRESERVATIVE FREE 3 ML: 5 INJECTION INTRAVENOUS at 20:02

## 2018-12-28 RX ADMIN — ACETAMINOPHEN 650 MG: 650 SUPPOSITORY RECTAL at 10:23

## 2018-12-28 RX ADMIN — SEVELAMER CARBONATE 800 MG: 800 TABLET, FILM COATED ORAL at 08:32

## 2018-12-28 RX ADMIN — INSULIN LISPRO 2 UNITS: 100 INJECTION, SOLUTION INTRAVENOUS; SUBCUTANEOUS at 12:39

## 2018-12-28 RX ADMIN — DOCUSATE SODIUM 250 MG: 50 CAPSULE, LIQUID FILLED ORAL at 08:31

## 2018-12-28 RX ADMIN — SODIUM CHLORIDE, PRESERVATIVE FREE 3 ML: 5 INJECTION INTRAVENOUS at 10:25

## 2018-12-28 RX ADMIN — SEVELAMER CARBONATE 800 MG: 800 TABLET, FILM COATED ORAL at 12:39

## 2018-12-28 RX ADMIN — LACTULOSE 20 G: 10 SOLUTION ORAL at 08:32

## 2018-12-28 RX ADMIN — HYDROCODONE BITARTRATE AND ACETAMINOPHEN 1 TABLET: 7.5; 325 TABLET ORAL at 19:15

## 2018-12-28 RX ADMIN — SEVELAMER CARBONATE 800 MG: 800 TABLET, FILM COATED ORAL at 20:02

## 2018-12-28 RX ADMIN — ATORVASTATIN CALCIUM 80 MG: 80 TABLET, FILM COATED ORAL at 19:57

## 2018-12-28 RX ADMIN — TAZOBACTAM SODIUM AND PIPERACILLIN SODIUM 3.38 G: 375; 3 INJECTION, SOLUTION INTRAVENOUS at 20:01

## 2018-12-29 PROBLEM — L08.9 INFECTED DECUBITUS ULCER: Status: ACTIVE | Noted: 2018-12-29

## 2018-12-29 PROBLEM — L89.90 INFECTED DECUBITUS ULCER: Status: ACTIVE | Noted: 2018-12-29

## 2018-12-29 LAB
GLUCOSE BLDC GLUCOMTR-MCNC: 129 MG/DL (ref 70–130)
GLUCOSE BLDC GLUCOMTR-MCNC: 201 MG/DL (ref 70–130)
GLUCOSE BLDC GLUCOMTR-MCNC: 212 MG/DL (ref 70–130)
GLUCOSE BLDC GLUCOMTR-MCNC: 218 MG/DL (ref 70–130)

## 2018-12-29 PROCEDURE — 63710000001 INSULIN GLARGINE PER 5 UNITS: Performed by: HOSPITALIST

## 2018-12-29 PROCEDURE — 25010000002 PIPERACILLIN SOD-TAZOBACTAM PER 1 G: Performed by: INTERNAL MEDICINE

## 2018-12-29 PROCEDURE — 82962 GLUCOSE BLOOD TEST: CPT

## 2018-12-29 PROCEDURE — 63710000001 INSULIN LISPRO (HUMAN) PER 5 UNITS: Performed by: INTERNAL MEDICINE

## 2018-12-29 RX ORDER — TERAZOSIN 5 MG/1
5 CAPSULE ORAL NIGHTLY
Status: DISCONTINUED | OUTPATIENT
Start: 2018-12-29 | End: 2019-01-02 | Stop reason: HOSPADM

## 2018-12-29 RX ADMIN — LACTULOSE 20 G: 10 SOLUTION ORAL at 21:05

## 2018-12-29 RX ADMIN — SODIUM CHLORIDE, PRESERVATIVE FREE 3 ML: 5 INJECTION INTRAVENOUS at 22:03

## 2018-12-29 RX ADMIN — INSULIN LISPRO 3 UNITS: 100 INJECTION, SOLUTION INTRAVENOUS; SUBCUTANEOUS at 17:24

## 2018-12-29 RX ADMIN — CARVEDILOL 12.5 MG: 12.5 TABLET, FILM COATED ORAL at 17:24

## 2018-12-29 RX ADMIN — INSULIN GLARGINE 18 UNITS: 100 INJECTION, SOLUTION SUBCUTANEOUS at 07:35

## 2018-12-29 RX ADMIN — INSULIN LISPRO 3 UNITS: 100 INJECTION, SOLUTION INTRAVENOUS; SUBCUTANEOUS at 08:17

## 2018-12-29 RX ADMIN — TAZOBACTAM SODIUM AND PIPERACILLIN SODIUM 3.38 G: 375; 3 INJECTION, SOLUTION INTRAVENOUS at 05:18

## 2018-12-29 RX ADMIN — SODIUM CHLORIDE, PRESERVATIVE FREE 3 ML: 5 INJECTION INTRAVENOUS at 08:11

## 2018-12-29 RX ADMIN — TERAZOSIN HYDROCHLORIDE 5 MG: 5 CAPSULE ORAL at 21:04

## 2018-12-29 RX ADMIN — INSULIN LISPRO 3 UNITS: 100 INJECTION, SOLUTION INTRAVENOUS; SUBCUTANEOUS at 21:05

## 2018-12-29 RX ADMIN — HYDROCODONE BITARTRATE AND ACETAMINOPHEN 1 TABLET: 7.5; 325 TABLET ORAL at 18:57

## 2018-12-29 RX ADMIN — SEVELAMER CARBONATE 800 MG: 800 TABLET, FILM COATED ORAL at 08:17

## 2018-12-29 RX ADMIN — CARVEDILOL 12.5 MG: 12.5 TABLET, FILM COATED ORAL at 08:17

## 2018-12-29 RX ADMIN — ASPIRIN 325 MG: 325 TABLET ORAL at 08:17

## 2018-12-29 RX ADMIN — ATORVASTATIN CALCIUM 80 MG: 80 TABLET, FILM COATED ORAL at 21:05

## 2018-12-29 RX ADMIN — SEVELAMER CARBONATE 800 MG: 800 TABLET, FILM COATED ORAL at 11:42

## 2018-12-29 RX ADMIN — SEVELAMER CARBONATE 800 MG: 800 TABLET, FILM COATED ORAL at 17:24

## 2018-12-29 RX ADMIN — TAZOBACTAM SODIUM AND PIPERACILLIN SODIUM 3.38 G: 375; 3 INJECTION, SOLUTION INTRAVENOUS at 17:16

## 2018-12-30 LAB
ANION GAP SERPL CALCULATED.3IONS-SCNC: 21.6 MMOL/L
BASOPHILS # BLD AUTO: 0.03 10*3/MM3 (ref 0–0.2)
BASOPHILS NFR BLD AUTO: 0.4 % (ref 0–1.5)
BUN BLD-MCNC: 32 MG/DL (ref 6–20)
BUN/CREAT SERPL: 5.8 (ref 7–25)
CALCIUM SPEC-SCNC: 8.8 MG/DL (ref 8.6–10.5)
CHLORIDE SERPL-SCNC: 96 MMOL/L (ref 98–107)
CO2 SERPL-SCNC: 23.4 MMOL/L (ref 22–29)
CREAT BLD-MCNC: 5.52 MG/DL (ref 0.57–1)
DEPRECATED RDW RBC AUTO: 60.9 FL (ref 37–54)
EOSINOPHIL # BLD AUTO: 0.12 10*3/MM3 (ref 0–0.7)
EOSINOPHIL NFR BLD AUTO: 1.6 % (ref 0.3–6.2)
ERYTHROCYTE [DISTWIDTH] IN BLOOD BY AUTOMATED COUNT: 17.4 % (ref 11.7–13)
GFR SERPL CREATININE-BSD FRML MDRD: 10 ML/MIN/1.73
GFR SERPL CREATININE-BSD FRML MDRD: ABNORMAL ML/MIN/1.73
GLUCOSE BLD-MCNC: 194 MG/DL (ref 65–99)
GLUCOSE BLDC GLUCOMTR-MCNC: 178 MG/DL (ref 70–130)
GLUCOSE BLDC GLUCOMTR-MCNC: 180 MG/DL (ref 70–130)
GLUCOSE BLDC GLUCOMTR-MCNC: 186 MG/DL (ref 70–130)
GLUCOSE BLDC GLUCOMTR-MCNC: 198 MG/DL (ref 70–130)
HCT VFR BLD AUTO: 41.3 % (ref 35.6–45.5)
HGB BLD-MCNC: 13.1 G/DL (ref 11.9–15.5)
IMM GRANULOCYTES # BLD AUTO: 0 10*3/MM3 (ref 0–0.03)
IMM GRANULOCYTES NFR BLD AUTO: 0 % (ref 0–0.5)
LYMPHOCYTES # BLD AUTO: 3.17 10*3/MM3 (ref 0.9–4.8)
LYMPHOCYTES NFR BLD AUTO: 41.1 % (ref 19.6–45.3)
MCH RBC QN AUTO: 30.2 PG (ref 26.9–32)
MCHC RBC AUTO-ENTMCNC: 31.7 G/DL (ref 32.4–36.3)
MCV RBC AUTO: 95.2 FL (ref 80.5–98.2)
MONOCYTES # BLD AUTO: 0.62 10*3/MM3 (ref 0.2–1.2)
MONOCYTES NFR BLD AUTO: 8 % (ref 5–12)
NEUTROPHILS # BLD AUTO: 3.78 10*3/MM3 (ref 1.9–8.1)
NEUTROPHILS NFR BLD AUTO: 48.9 % (ref 42.7–76)
PLATELET # BLD AUTO: 219 10*3/MM3 (ref 140–500)
PMV BLD AUTO: 10.1 FL (ref 6–12)
POTASSIUM BLD-SCNC: 4 MMOL/L (ref 3.5–5.2)
RBC # BLD AUTO: 4.34 10*6/MM3 (ref 3.9–5.2)
SODIUM BLD-SCNC: 141 MMOL/L (ref 136–145)
WBC NRBC COR # BLD: 7.72 10*3/MM3 (ref 4.5–10.7)

## 2018-12-30 PROCEDURE — 63710000001 INSULIN GLARGINE PER 5 UNITS: Performed by: HOSPITALIST

## 2018-12-30 PROCEDURE — 80048 BASIC METABOLIC PNL TOTAL CA: CPT | Performed by: HOSPITALIST

## 2018-12-30 PROCEDURE — 63710000001 INSULIN LISPRO (HUMAN) PER 5 UNITS: Performed by: INTERNAL MEDICINE

## 2018-12-30 PROCEDURE — 5A1D70Z PERFORMANCE OF URINARY FILTRATION, INTERMITTENT, LESS THAN 6 HOURS PER DAY: ICD-10-PCS | Performed by: INTERNAL MEDICINE

## 2018-12-30 PROCEDURE — 82962 GLUCOSE BLOOD TEST: CPT

## 2018-12-30 PROCEDURE — 85025 COMPLETE CBC W/AUTO DIFF WBC: CPT | Performed by: HOSPITALIST

## 2018-12-30 PROCEDURE — 25010000002 PIPERACILLIN SOD-TAZOBACTAM PER 1 G: Performed by: INTERNAL MEDICINE

## 2018-12-30 RX ORDER — ONDANSETRON 2 MG/ML
4 INJECTION INTRAMUSCULAR; INTRAVENOUS EVERY 6 HOURS PRN
Status: DISCONTINUED | OUTPATIENT
Start: 2018-12-30 | End: 2019-01-02 | Stop reason: HOSPADM

## 2018-12-30 RX ADMIN — TAZOBACTAM SODIUM AND PIPERACILLIN SODIUM 3.38 G: 375; 3 INJECTION, SOLUTION INTRAVENOUS at 05:23

## 2018-12-30 RX ADMIN — TAZOBACTAM SODIUM AND PIPERACILLIN SODIUM 3.38 G: 375; 3 INJECTION, SOLUTION INTRAVENOUS at 18:48

## 2018-12-30 RX ADMIN — INSULIN GLARGINE 18 UNITS: 100 INJECTION, SOLUTION SUBCUTANEOUS at 06:21

## 2018-12-30 RX ADMIN — HYDROCODONE BITARTRATE AND ACETAMINOPHEN 1 TABLET: 7.5; 325 TABLET ORAL at 10:57

## 2018-12-30 RX ADMIN — INSULIN LISPRO 2 UNITS: 100 INJECTION, SOLUTION INTRAVENOUS; SUBCUTANEOUS at 11:56

## 2018-12-30 RX ADMIN — TERAZOSIN HYDROCHLORIDE 5 MG: 5 CAPSULE ORAL at 20:14

## 2018-12-30 RX ADMIN — CARVEDILOL 12.5 MG: 12.5 TABLET, FILM COATED ORAL at 08:10

## 2018-12-30 RX ADMIN — SODIUM CHLORIDE, PRESERVATIVE FREE 3 ML: 5 INJECTION INTRAVENOUS at 08:05

## 2018-12-30 RX ADMIN — SEVELAMER CARBONATE 800 MG: 800 TABLET, FILM COATED ORAL at 08:10

## 2018-12-30 RX ADMIN — HYDROCODONE BITARTRATE AND ACETAMINOPHEN 1 TABLET: 7.5; 325 TABLET ORAL at 00:52

## 2018-12-30 RX ADMIN — INSULIN LISPRO 2 UNITS: 100 INJECTION, SOLUTION INTRAVENOUS; SUBCUTANEOUS at 08:00

## 2018-12-30 RX ADMIN — SEVELAMER CARBONATE 800 MG: 800 TABLET, FILM COATED ORAL at 17:09

## 2018-12-30 RX ADMIN — INSULIN LISPRO 2 UNITS: 100 INJECTION, SOLUTION INTRAVENOUS; SUBCUTANEOUS at 17:09

## 2018-12-30 RX ADMIN — INSULIN LISPRO 2 UNITS: 100 INJECTION, SOLUTION INTRAVENOUS; SUBCUTANEOUS at 21:31

## 2018-12-30 RX ADMIN — SEVELAMER CARBONATE 800 MG: 800 TABLET, FILM COATED ORAL at 11:56

## 2018-12-30 RX ADMIN — LACTULOSE 20 G: 10 SOLUTION ORAL at 20:13

## 2018-12-30 RX ADMIN — ATORVASTATIN CALCIUM 80 MG: 80 TABLET, FILM COATED ORAL at 20:14

## 2018-12-30 RX ADMIN — ASPIRIN 325 MG: 325 TABLET ORAL at 08:10

## 2018-12-30 RX ADMIN — SODIUM CHLORIDE, PRESERVATIVE FREE 3 ML: 5 INJECTION INTRAVENOUS at 20:17

## 2018-12-30 RX ADMIN — HYDROCODONE BITARTRATE AND ACETAMINOPHEN 1 TABLET: 7.5; 325 TABLET ORAL at 17:09

## 2018-12-31 LAB
ANION GAP SERPL CALCULATED.3IONS-SCNC: 21.1 MMOL/L
BACTERIA SPEC AEROBE CULT: NORMAL
BACTERIA SPEC AEROBE CULT: NORMAL
BASOPHILS # BLD AUTO: 0.02 10*3/MM3 (ref 0–0.2)
BASOPHILS NFR BLD AUTO: 0.2 % (ref 0–1.5)
BUN BLD-MCNC: 17 MG/DL (ref 6–20)
BUN/CREAT SERPL: 4.2 (ref 7–25)
CALCIUM SPEC-SCNC: 9.6 MG/DL (ref 8.6–10.5)
CHLORIDE SERPL-SCNC: 93 MMOL/L (ref 98–107)
CO2 SERPL-SCNC: 26.9 MMOL/L (ref 22–29)
CREAT BLD-MCNC: 4.08 MG/DL (ref 0.57–1)
CRP SERPL-MCNC: 1.75 MG/DL (ref 0–0.5)
DEPRECATED RDW RBC AUTO: 60.2 FL (ref 37–54)
EOSINOPHIL # BLD AUTO: 0.07 10*3/MM3 (ref 0–0.7)
EOSINOPHIL NFR BLD AUTO: 0.8 % (ref 0.3–6.2)
ERYTHROCYTE [DISTWIDTH] IN BLOOD BY AUTOMATED COUNT: 17.3 % (ref 11.7–13)
ERYTHROCYTE [SEDIMENTATION RATE] IN BLOOD: 43 MM/HR (ref 0–30)
GFR SERPL CREATININE-BSD FRML MDRD: 14 ML/MIN/1.73
GFR SERPL CREATININE-BSD FRML MDRD: ABNORMAL ML/MIN/1.73
GLUCOSE BLD-MCNC: 202 MG/DL (ref 65–99)
GLUCOSE BLDC GLUCOMTR-MCNC: 184 MG/DL (ref 70–130)
GLUCOSE BLDC GLUCOMTR-MCNC: 202 MG/DL (ref 70–130)
GLUCOSE BLDC GLUCOMTR-MCNC: 204 MG/DL (ref 70–130)
GLUCOSE BLDC GLUCOMTR-MCNC: 231 MG/DL (ref 70–130)
HCT VFR BLD AUTO: 43.8 % (ref 35.6–45.5)
HGB BLD-MCNC: 13.9 G/DL (ref 11.9–15.5)
IMM GRANULOCYTES # BLD AUTO: 0 10*3/MM3 (ref 0–0.03)
IMM GRANULOCYTES NFR BLD AUTO: 0 % (ref 0–0.5)
LYMPHOCYTES # BLD AUTO: 2.67 10*3/MM3 (ref 0.9–4.8)
LYMPHOCYTES NFR BLD AUTO: 32.1 % (ref 19.6–45.3)
MCH RBC QN AUTO: 30.2 PG (ref 26.9–32)
MCHC RBC AUTO-ENTMCNC: 31.7 G/DL (ref 32.4–36.3)
MCV RBC AUTO: 95 FL (ref 80.5–98.2)
MONOCYTES # BLD AUTO: 0.62 10*3/MM3 (ref 0.2–1.2)
MONOCYTES NFR BLD AUTO: 7.5 % (ref 5–12)
NEUTROPHILS # BLD AUTO: 4.94 10*3/MM3 (ref 1.9–8.1)
NEUTROPHILS NFR BLD AUTO: 59.4 % (ref 42.7–76)
PLATELET # BLD AUTO: 242 10*3/MM3 (ref 140–500)
PMV BLD AUTO: 10.4 FL (ref 6–12)
POTASSIUM BLD-SCNC: 3.8 MMOL/L (ref 3.5–5.2)
RBC # BLD AUTO: 4.61 10*6/MM3 (ref 3.9–5.2)
SODIUM BLD-SCNC: 141 MMOL/L (ref 136–145)
WBC NRBC COR # BLD: 8.32 10*3/MM3 (ref 4.5–10.7)

## 2018-12-31 PROCEDURE — 86140 C-REACTIVE PROTEIN: CPT | Performed by: HOSPITALIST

## 2018-12-31 PROCEDURE — 85025 COMPLETE CBC W/AUTO DIFF WBC: CPT | Performed by: HOSPITALIST

## 2018-12-31 PROCEDURE — 63710000001 INSULIN LISPRO (HUMAN) PER 5 UNITS: Performed by: INTERNAL MEDICINE

## 2018-12-31 PROCEDURE — 82962 GLUCOSE BLOOD TEST: CPT

## 2018-12-31 PROCEDURE — 25010000002 ONDANSETRON PER 1 MG: Performed by: INTERNAL MEDICINE

## 2018-12-31 PROCEDURE — 25010000002 PIPERACILLIN SOD-TAZOBACTAM PER 1 G: Performed by: INTERNAL MEDICINE

## 2018-12-31 PROCEDURE — 85652 RBC SED RATE AUTOMATED: CPT | Performed by: HOSPITALIST

## 2018-12-31 PROCEDURE — 80048 BASIC METABOLIC PNL TOTAL CA: CPT | Performed by: HOSPITALIST

## 2018-12-31 PROCEDURE — 63710000001 INSULIN GLARGINE PER 5 UNITS: Performed by: HOSPITALIST

## 2018-12-31 RX ADMIN — HYDROCODONE BITARTRATE AND ACETAMINOPHEN 1 TABLET: 7.5; 325 TABLET ORAL at 20:00

## 2018-12-31 RX ADMIN — INSULIN LISPRO 3 UNITS: 100 INJECTION, SOLUTION INTRAVENOUS; SUBCUTANEOUS at 07:48

## 2018-12-31 RX ADMIN — INSULIN LISPRO 3 UNITS: 100 INJECTION, SOLUTION INTRAVENOUS; SUBCUTANEOUS at 11:37

## 2018-12-31 RX ADMIN — CARVEDILOL 12.5 MG: 12.5 TABLET, FILM COATED ORAL at 17:11

## 2018-12-31 RX ADMIN — ONDANSETRON HYDROCHLORIDE 4 MG: 2 SOLUTION INTRAMUSCULAR; INTRAVENOUS at 00:02

## 2018-12-31 RX ADMIN — HYDROCODONE BITARTRATE AND ACETAMINOPHEN 1 TABLET: 7.5; 325 TABLET ORAL at 00:02

## 2018-12-31 RX ADMIN — CARVEDILOL 12.5 MG: 12.5 TABLET, FILM COATED ORAL at 08:16

## 2018-12-31 RX ADMIN — INSULIN LISPRO 3 UNITS: 100 INJECTION, SOLUTION INTRAVENOUS; SUBCUTANEOUS at 21:40

## 2018-12-31 RX ADMIN — ATORVASTATIN CALCIUM 80 MG: 80 TABLET, FILM COATED ORAL at 20:00

## 2018-12-31 RX ADMIN — ONDANSETRON HYDROCHLORIDE 4 MG: 2 SOLUTION INTRAMUSCULAR; INTRAVENOUS at 06:00

## 2018-12-31 RX ADMIN — TERAZOSIN HYDROCHLORIDE 5 MG: 5 CAPSULE ORAL at 20:00

## 2018-12-31 RX ADMIN — SODIUM CHLORIDE, PRESERVATIVE FREE 3 ML: 5 INJECTION INTRAVENOUS at 20:00

## 2018-12-31 RX ADMIN — INSULIN GLARGINE 18 UNITS: 100 INJECTION, SOLUTION SUBCUTANEOUS at 07:20

## 2018-12-31 RX ADMIN — TAZOBACTAM SODIUM AND PIPERACILLIN SODIUM 3.38 G: 375; 3 INJECTION, SOLUTION INTRAVENOUS at 17:11

## 2018-12-31 RX ADMIN — TAZOBACTAM SODIUM AND PIPERACILLIN SODIUM 3.38 G: 375; 3 INJECTION, SOLUTION INTRAVENOUS at 04:51

## 2018-12-31 RX ADMIN — SEVELAMER CARBONATE 800 MG: 800 TABLET, FILM COATED ORAL at 11:37

## 2018-12-31 RX ADMIN — SEVELAMER CARBONATE 800 MG: 800 TABLET, FILM COATED ORAL at 08:16

## 2018-12-31 RX ADMIN — TAZOBACTAM SODIUM AND PIPERACILLIN SODIUM 3.38 G: 375; 3 INJECTION, SOLUTION INTRAVENOUS at 05:56

## 2018-12-31 RX ADMIN — INSULIN LISPRO 2 UNITS: 100 INJECTION, SOLUTION INTRAVENOUS; SUBCUTANEOUS at 17:12

## 2018-12-31 RX ADMIN — SEVELAMER CARBONATE 800 MG: 800 TABLET, FILM COATED ORAL at 17:11

## 2018-12-31 RX ADMIN — ASPIRIN 325 MG: 325 TABLET ORAL at 08:16

## 2018-12-31 RX ADMIN — HYDROCODONE BITARTRATE AND ACETAMINOPHEN 1 TABLET: 7.5; 325 TABLET ORAL at 11:37

## 2018-12-31 RX ADMIN — HYDROCODONE BITARTRATE AND ACETAMINOPHEN 1 TABLET: 7.5; 325 TABLET ORAL at 06:00

## 2018-12-31 RX ADMIN — SODIUM CHLORIDE, PRESERVATIVE FREE 3 ML: 5 INJECTION INTRAVENOUS at 08:18

## 2019-01-01 LAB
ALBUMIN SERPL-MCNC: 3.5 G/DL (ref 3.5–5.2)
ANION GAP SERPL CALCULATED.3IONS-SCNC: 17.7 MMOL/L
BASOPHILS # BLD AUTO: 0.03 10*3/MM3 (ref 0–0.2)
BASOPHILS NFR BLD AUTO: 0.3 % (ref 0–1.5)
BUN BLD-MCNC: 29 MG/DL (ref 6–20)
BUN/CREAT SERPL: 5.2 (ref 7–25)
CALCIUM SPEC-SCNC: 8.4 MG/DL (ref 8.6–10.5)
CHLORIDE SERPL-SCNC: 92 MMOL/L (ref 98–107)
CO2 SERPL-SCNC: 29.3 MMOL/L (ref 22–29)
CREAT BLD-MCNC: 5.63 MG/DL (ref 0.57–1)
DEPRECATED RDW RBC AUTO: 61.1 FL (ref 37–54)
EOSINOPHIL # BLD AUTO: 0.09 10*3/MM3 (ref 0–0.7)
EOSINOPHIL NFR BLD AUTO: 1 % (ref 0.3–6.2)
ERYTHROCYTE [DISTWIDTH] IN BLOOD BY AUTOMATED COUNT: 17.3 % (ref 11.7–13)
GFR SERPL CREATININE-BSD FRML MDRD: 9 ML/MIN/1.73
GFR SERPL CREATININE-BSD FRML MDRD: ABNORMAL ML/MIN/1.73
GLUCOSE BLD-MCNC: 174 MG/DL (ref 65–99)
GLUCOSE BLDC GLUCOMTR-MCNC: 108 MG/DL (ref 70–130)
GLUCOSE BLDC GLUCOMTR-MCNC: 134 MG/DL (ref 70–130)
GLUCOSE BLDC GLUCOMTR-MCNC: 152 MG/DL (ref 70–130)
GLUCOSE BLDC GLUCOMTR-MCNC: 168 MG/DL (ref 70–130)
HCT VFR BLD AUTO: 41.2 % (ref 35.6–45.5)
HGB BLD-MCNC: 12.6 G/DL (ref 11.9–15.5)
IMM GRANULOCYTES # BLD AUTO: 0 10*3/MM3 (ref 0–0.03)
IMM GRANULOCYTES NFR BLD AUTO: 0 % (ref 0–0.5)
LYMPHOCYTES # BLD AUTO: 2.84 10*3/MM3 (ref 0.9–4.8)
LYMPHOCYTES NFR BLD AUTO: 32.8 % (ref 19.6–45.3)
MCH RBC QN AUTO: 29.4 PG (ref 26.9–32)
MCHC RBC AUTO-ENTMCNC: 30.6 G/DL (ref 32.4–36.3)
MCV RBC AUTO: 96 FL (ref 80.5–98.2)
MONOCYTES # BLD AUTO: 0.64 10*3/MM3 (ref 0.2–1.2)
MONOCYTES NFR BLD AUTO: 7.4 % (ref 5–12)
NEUTROPHILS # BLD AUTO: 5.05 10*3/MM3 (ref 1.9–8.1)
NEUTROPHILS NFR BLD AUTO: 58.5 % (ref 42.7–76)
PHOSPHATE SERPL-MCNC: 7.8 MG/DL (ref 2.5–4.5)
PLATELET # BLD AUTO: 236 10*3/MM3 (ref 140–500)
PMV BLD AUTO: 10.3 FL (ref 6–12)
POTASSIUM BLD-SCNC: 4.3 MMOL/L (ref 3.5–5.2)
RBC # BLD AUTO: 4.29 10*6/MM3 (ref 3.9–5.2)
SODIUM BLD-SCNC: 139 MMOL/L (ref 136–145)
WBC NRBC COR # BLD: 8.65 10*3/MM3 (ref 4.5–10.7)

## 2019-01-01 PROCEDURE — 82962 GLUCOSE BLOOD TEST: CPT

## 2019-01-01 PROCEDURE — 63710000001 INSULIN LISPRO (HUMAN) PER 5 UNITS: Performed by: INTERNAL MEDICINE

## 2019-01-01 PROCEDURE — 80069 RENAL FUNCTION PANEL: CPT | Performed by: INTERNAL MEDICINE

## 2019-01-01 PROCEDURE — 85025 COMPLETE CBC W/AUTO DIFF WBC: CPT | Performed by: INTERNAL MEDICINE

## 2019-01-01 PROCEDURE — 63710000001 INSULIN GLARGINE PER 5 UNITS: Performed by: HOSPITALIST

## 2019-01-01 PROCEDURE — 25010000002 PIPERACILLIN SOD-TAZOBACTAM PER 1 G: Performed by: INTERNAL MEDICINE

## 2019-01-01 RX ADMIN — SODIUM CHLORIDE, PRESERVATIVE FREE 3 ML: 5 INJECTION INTRAVENOUS at 08:38

## 2019-01-01 RX ADMIN — LACTULOSE 20 G: 10 SOLUTION ORAL at 20:05

## 2019-01-01 RX ADMIN — TAZOBACTAM SODIUM AND PIPERACILLIN SODIUM 3.38 G: 375; 3 INJECTION, SOLUTION INTRAVENOUS at 17:55

## 2019-01-01 RX ADMIN — TERAZOSIN HYDROCHLORIDE 5 MG: 5 CAPSULE ORAL at 20:05

## 2019-01-01 RX ADMIN — ATORVASTATIN CALCIUM 80 MG: 80 TABLET, FILM COATED ORAL at 20:05

## 2019-01-01 RX ADMIN — INSULIN LISPRO 2 UNITS: 100 INJECTION, SOLUTION INTRAVENOUS; SUBCUTANEOUS at 08:38

## 2019-01-01 RX ADMIN — HYDROCODONE BITARTRATE AND ACETAMINOPHEN 1 TABLET: 7.5; 325 TABLET ORAL at 17:08

## 2019-01-01 RX ADMIN — DOCUSATE SODIUM 250 MG: 50 CAPSULE, LIQUID FILLED ORAL at 08:37

## 2019-01-01 RX ADMIN — INSULIN GLARGINE 18 UNITS: 100 INJECTION, SOLUTION SUBCUTANEOUS at 08:38

## 2019-01-01 RX ADMIN — HYDROCODONE BITARTRATE AND ACETAMINOPHEN 1 TABLET: 7.5; 325 TABLET ORAL at 12:11

## 2019-01-01 RX ADMIN — SODIUM CHLORIDE, PRESERVATIVE FREE 3 ML: 5 INJECTION INTRAVENOUS at 20:05

## 2019-01-01 RX ADMIN — POLYETHYLENE GLYCOL 3350 17 G: 17 POWDER, FOR SOLUTION ORAL at 08:37

## 2019-01-01 RX ADMIN — SEVELAMER CARBONATE 800 MG: 800 TABLET, FILM COATED ORAL at 08:38

## 2019-01-01 RX ADMIN — LACTULOSE 20 G: 10 SOLUTION ORAL at 08:37

## 2019-01-01 RX ADMIN — ASPIRIN 325 MG: 325 TABLET ORAL at 08:37

## 2019-01-01 RX ADMIN — HYDROCODONE BITARTRATE AND ACETAMINOPHEN 1 TABLET: 7.5; 325 TABLET ORAL at 22:50

## 2019-01-01 RX ADMIN — TAZOBACTAM SODIUM AND PIPERACILLIN SODIUM 3.38 G: 375; 3 INJECTION, SOLUTION INTRAVENOUS at 05:05

## 2019-01-01 RX ADMIN — SEVELAMER CARBONATE 800 MG: 800 TABLET, FILM COATED ORAL at 12:00

## 2019-01-01 RX ADMIN — HYDROCODONE BITARTRATE AND ACETAMINOPHEN 1 TABLET: 7.5; 325 TABLET ORAL at 04:34

## 2019-01-01 NOTE — PROGRESS NOTES
Nephrology Progress Note  Patient: Carolina Bhat  Date of Service: 2019  9:56 AM  : 1960  MRN: [id number]  ATTENDING: Max Mendoza MD  PRIMARY: Merrill Odonnell MD  _________________________________________    Follow up for: ESRD    HPI and Review of Systems:  The patient is feeling much better today denies any chest pain or shortness of air, no orthopnea or PND, no nausea or vomiting.  No abdominal pain.  She had dialysis on , without any difficulties    .        Scheduled Meds:    aspirin 325 mg Oral Daily   Or      aspirin 300 mg Rectal Daily   atorvastatin 80 mg Oral Nightly   carvedilol 12.5 mg Oral BID With Meals   docusate sodium 250 mg Oral Daily   insulin glargine 18 Units Subcutaneous QAM   insulin lispro 0-7 Units Subcutaneous 4x Daily With Meals & Nightly   lactulose 20 g Oral BID   piperacillin-tazobactam 3.375 g Intravenous Q12H   polyethylene glycol 17 g Oral Daily   sevelamer 800 mg Oral TID With Meals   sodium chloride 3 mL Intravenous Q12H   terazosin 5 mg Oral Nightly     Continuous Infusions:    Pharmacy to Dose Zosyn      PRN Meds: •  acetaminophen  •  dextrose  •  dextrose  •  glucagon (human recombinant)  •  HYDROcodone-acetaminophen  •  ondansetron  •  ondansetron  •  Pharmacy to Dose Zosyn  •  [COMPLETED] Insert peripheral IV **AND** sodium chloride  •  sodium chloride    Objective  Temp:  [97.7 °F (36.5 °C)-98 °F (36.7 °C)] 98 °F (36.7 °C)  Heart Rate:  [] 97  Resp:  [18] 18  BP: ()/(58-70) 98/61  General Appearance: alert, oriented x 3, no acute distress, obese, chronically ill  Skin: warm and dry  HEENT: Nonicteric sclerae, oral mucosa is normal,   Neck: supple, no JVD, trachea midline  Lungs: Bilateral rhonchi, unlabored breathing effort  Heart: RRR, normal S1 and S2, no S3, no rub  Abdomen: soft, non-tender,  present bowel sounds to auscultation  : no palpable bladder,  Extremities: no edema, cyanosis or clubbing, functional AV fistula in the  right upper arm  Neuro: normal speech and mental status, she had the left hemiparesis with contracted upper extremity related to her previous        LAB:  Results from last 7 days   Lab Units  01/01/19   0619 12/31/18 0439 12/30/18   0538   12/26/18   1517   SODIUM mmol/L  139  141  141   < >  138   POTASSIUM mmol/L  4.3  3.8  4.0   < >  4.1   CHLORIDE mmol/L  92*  93*  96*   < >  92*   CO2 mmol/L  29.3*  26.9  23.4   < >  30.5*   BUN mg/dL  29*  17  32*   < >  38*   CREATININE mg/dL  5.63*  4.08*  5.52*   < >  4.47*   GLUCOSE mg/dL  174*  202*  194*   < >  179*   CALCIUM mg/dL  8.4*  9.6  8.8   < >  8.8   PHOSPHORUS mg/dL  7.8*   --    --    --   3.6   ALBUMIN g/dL  3.50   --    --    --    --     < > = values in this interval not displayed.       Results from last 7 days   Lab Units  01/01/19 0619 12/31/18 0439 12/30/18   0538   WBC 10*3/mm3  8.65  8.32  7.72   HEMOGLOBIN g/dL  12.6  13.9  13.1   PLATELETS 10*3/mm3  236  242  219       Assessment:  1.  End-stage renal disease, she had dialysis on Sunday, her next dialysis will be on Wednesday.  2.  Altered mental status has resolved  3.  Anemia of chronic kidney disease  4.  Immobility associated with old stroke  5.  Diabetes mellitus type 2  6.  Hypertension, controlled    Plan:  1. Next dialysis is tomorrow  2.  Continue the same treatment  3.  Surveillance labs    _______________________________  Liam Rangel MD  [facility]  [phone]

## 2019-01-01 NOTE — PLAN OF CARE
Problem: Patient Care Overview  Goal: Plan of Care Review  Outcome: Ongoing (interventions implemented as appropriate)   01/01/19 0326   Coping/Psychosocial   Plan of Care Reviewed With patient   Plan of Care Review   Progress improving   OTHER   Outcome Summary Pt oriented to self, follows commands. No nausea/vomiting overnight. Tolerating current diet. Assistance with turns provided. Will continue to monitor.        Problem: Fall Risk (Adult)  Goal: Absence of Fall  Outcome: Ongoing (interventions implemented as appropriate)      Problem: Stroke (Ischemic) (Adult)  Goal: Signs and Symptoms of Listed Potential Problems Will be Absent, Minimized or Managed (Stroke)  Outcome: Ongoing (interventions implemented as appropriate)

## 2019-01-02 VITALS
TEMPERATURE: 98 F | SYSTOLIC BLOOD PRESSURE: 124 MMHG | HEART RATE: 92 BPM | DIASTOLIC BLOOD PRESSURE: 71 MMHG | HEIGHT: 66 IN | RESPIRATION RATE: 18 BRPM | WEIGHT: 214.29 LBS | BODY MASS INDEX: 34.44 KG/M2 | OXYGEN SATURATION: 98 %

## 2019-01-02 LAB
ALBUMIN SERPL-MCNC: 3.4 G/DL (ref 3.5–5.2)
ANION GAP SERPL CALCULATED.3IONS-SCNC: 23.1 MMOL/L
BASOPHILS # BLD AUTO: 0.03 10*3/MM3 (ref 0–0.2)
BASOPHILS NFR BLD AUTO: 0.3 % (ref 0–1.5)
BUN BLD-MCNC: 36 MG/DL (ref 6–20)
BUN/CREAT SERPL: 5.1 (ref 7–25)
CALCIUM SPEC-SCNC: 8.3 MG/DL (ref 8.6–10.5)
CHLORIDE SERPL-SCNC: 89 MMOL/L (ref 98–107)
CO2 SERPL-SCNC: 26.9 MMOL/L (ref 22–29)
CREAT BLD-MCNC: 7.04 MG/DL (ref 0.57–1)
DEPRECATED RDW RBC AUTO: 59.7 FL (ref 37–54)
EOSINOPHIL # BLD AUTO: 0.16 10*3/MM3 (ref 0–0.7)
EOSINOPHIL NFR BLD AUTO: 1.8 % (ref 0.3–6.2)
ERYTHROCYTE [DISTWIDTH] IN BLOOD BY AUTOMATED COUNT: 17.2 % (ref 11.7–13)
GFR SERPL CREATININE-BSD FRML MDRD: 7 ML/MIN/1.73
GFR SERPL CREATININE-BSD FRML MDRD: ABNORMAL ML/MIN/1.73
GLUCOSE BLD-MCNC: 86 MG/DL (ref 65–99)
GLUCOSE BLDC GLUCOMTR-MCNC: 144 MG/DL (ref 70–130)
GLUCOSE BLDC GLUCOMTR-MCNC: 148 MG/DL (ref 70–130)
GLUCOSE BLDC GLUCOMTR-MCNC: 99 MG/DL (ref 70–130)
HCT VFR BLD AUTO: 40 % (ref 35.6–45.5)
HGB BLD-MCNC: 12.5 G/DL (ref 11.9–15.5)
IMM GRANULOCYTES # BLD AUTO: 0.02 10*3/MM3 (ref 0–0.03)
IMM GRANULOCYTES NFR BLD AUTO: 0.2 % (ref 0–0.5)
LYMPHOCYTES # BLD AUTO: 3.25 10*3/MM3 (ref 0.9–4.8)
LYMPHOCYTES NFR BLD AUTO: 36.5 % (ref 19.6–45.3)
MCH RBC QN AUTO: 29.6 PG (ref 26.9–32)
MCHC RBC AUTO-ENTMCNC: 31.3 G/DL (ref 32.4–36.3)
MCV RBC AUTO: 94.6 FL (ref 80.5–98.2)
MONOCYTES # BLD AUTO: 0.47 10*3/MM3 (ref 0.2–1.2)
MONOCYTES NFR BLD AUTO: 5.3 % (ref 5–12)
NEUTROPHILS # BLD AUTO: 4.98 10*3/MM3 (ref 1.9–8.1)
NEUTROPHILS NFR BLD AUTO: 55.9 % (ref 42.7–76)
PHOSPHATE SERPL-MCNC: 8.1 MG/DL (ref 2.5–4.5)
PLATELET # BLD AUTO: 221 10*3/MM3 (ref 140–500)
PMV BLD AUTO: 10.2 FL (ref 6–12)
POTASSIUM BLD-SCNC: 4.6 MMOL/L (ref 3.5–5.2)
RBC # BLD AUTO: 4.23 10*6/MM3 (ref 3.9–5.2)
SODIUM BLD-SCNC: 139 MMOL/L (ref 136–145)
WBC NRBC COR # BLD: 8.91 10*3/MM3 (ref 4.5–10.7)

## 2019-01-02 PROCEDURE — 80069 RENAL FUNCTION PANEL: CPT | Performed by: INTERNAL MEDICINE

## 2019-01-02 PROCEDURE — 25010000002 PIPERACILLIN SOD-TAZOBACTAM PER 1 G: Performed by: INTERNAL MEDICINE

## 2019-01-02 PROCEDURE — 82962 GLUCOSE BLOOD TEST: CPT

## 2019-01-02 PROCEDURE — 5A1D70Z PERFORMANCE OF URINARY FILTRATION, INTERMITTENT, LESS THAN 6 HOURS PER DAY: ICD-10-PCS | Performed by: INTERNAL MEDICINE

## 2019-01-02 PROCEDURE — 63710000001 INSULIN GLARGINE PER 5 UNITS: Performed by: HOSPITALIST

## 2019-01-02 PROCEDURE — 92526 ORAL FUNCTION THERAPY: CPT

## 2019-01-02 PROCEDURE — 85025 COMPLETE CBC W/AUTO DIFF WBC: CPT | Performed by: INTERNAL MEDICINE

## 2019-01-02 RX ORDER — MANNITOL 250 MG/ML
12.5 INJECTION, SOLUTION INTRAVENOUS AS NEEDED
Status: DISCONTINUED | OUTPATIENT
Start: 2019-01-02 | End: 2019-01-02 | Stop reason: HOSPADM

## 2019-01-02 RX ORDER — HYDROCODONE BITARTRATE AND ACETAMINOPHEN 7.5; 325 MG/1; MG/1
1 TABLET ORAL EVERY 6 HOURS PRN
Qty: 20 TABLET | Refills: 0 | Status: SHIPPED | OUTPATIENT
Start: 2019-01-02 | End: 2019-01-07

## 2019-01-02 RX ADMIN — HYDROCODONE BITARTRATE AND ACETAMINOPHEN 1 TABLET: 7.5; 325 TABLET ORAL at 04:33

## 2019-01-02 RX ADMIN — SEVELAMER CARBONATE 800 MG: 800 TABLET, FILM COATED ORAL at 09:45

## 2019-01-02 RX ADMIN — ASPIRIN 325 MG: 325 TABLET ORAL at 09:45

## 2019-01-02 RX ADMIN — SEVELAMER CARBONATE 800 MG: 800 TABLET, FILM COATED ORAL at 12:47

## 2019-01-02 RX ADMIN — HYDROCODONE BITARTRATE AND ACETAMINOPHEN 1 TABLET: 7.5; 325 TABLET ORAL at 17:33

## 2019-01-02 RX ADMIN — INSULIN GLARGINE 18 UNITS: 100 INJECTION, SOLUTION SUBCUTANEOUS at 09:42

## 2019-01-02 RX ADMIN — SODIUM CHLORIDE, PRESERVATIVE FREE 3 ML: 5 INJECTION INTRAVENOUS at 20:48

## 2019-01-02 RX ADMIN — TERAZOSIN HYDROCHLORIDE 5 MG: 5 CAPSULE ORAL at 20:49

## 2019-01-02 RX ADMIN — TAZOBACTAM SODIUM AND PIPERACILLIN SODIUM 3.38 G: 375; 3 INJECTION, SOLUTION INTRAVENOUS at 05:53

## 2019-01-02 RX ADMIN — ATORVASTATIN CALCIUM 80 MG: 80 TABLET, FILM COATED ORAL at 20:49

## 2019-01-02 RX ADMIN — SODIUM CHLORIDE, PRESERVATIVE FREE 3 ML: 5 INJECTION INTRAVENOUS at 09:39

## 2019-01-02 RX ADMIN — DOCUSATE SODIUM 250 MG: 50 CAPSULE, LIQUID FILLED ORAL at 09:39

## 2019-01-02 RX ADMIN — POLYETHYLENE GLYCOL 3350 17 G: 17 POWDER, FOR SOLUTION ORAL at 09:44

## 2019-01-02 RX ADMIN — HYDROCODONE BITARTRATE AND ACETAMINOPHEN 1 TABLET: 7.5; 325 TABLET ORAL at 09:44

## 2019-01-02 RX ADMIN — LACTULOSE 20 G: 10 SOLUTION ORAL at 09:39

## 2019-01-02 RX ADMIN — CARVEDILOL 12.5 MG: 12.5 TABLET, FILM COATED ORAL at 09:45

## 2019-01-02 NOTE — PROGRESS NOTES
[facility]    Physicians Statement of Medical Necessity for Ambulance Transportation    It is medically necessary for:    Patient Name: Carolina Bhat    Insurance Information:  MEDICARE AND [place] MEDICAID    To be transported by ambulance: [facility]    From (if nursing facility, specify level of care: skilled, residential, etc): [facility]    To (specify level of care if nursing facility): [facility]    Date of Service: 1/2/2019     For dialysis patients state date dialysis began: CKD, last session 1/2/2019    Diagnosis: ALTERED MENTAL STATUS    Past Medical/Surgical History:  Past Medical History:   Diagnosis Date   • Acid reflux    • Anemia    • Anxiety    • Chronic constipation    • Chronic pain    • Coronary artery disease    • CVA (cerebral vascular accident) (CMS/[facility]) 2011, 2012   • Depression    • DM (diabetes mellitus) type II controlled with renal manifestation (CMS/[facility])    • Dyslipidemia    • Dysphagia     G-tube removed 2015   • ESRD (end stage renal disease) (CMS/[facility]) 2016    Started HD 2016  [facility]   • Flexion contractures    • Hard to intubate     pt does not know   • History of esophagitis    • HLD (hyperlipidemia)    • Hypertension    • 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 (CMS/[facility]) 08/09/2017   • Nausea & vomiting    • Open angle glaucoma with borderline intraocular pressure, bilateral 08/09/2017   • Venous insufficiency (chronic) (peripheral)       Past Surgical History:   Procedure Laterality Date   • ARTERIOVENOUS FISTULA/SHUNT SURGERY Right 3/21/2016    Procedure: RT BRACHIAL CEPHALIC FISTULA;  Surgeon: Alla Price Jr., MD;  Location: [facility];  Service:    • BREAST BIOPSY          Current Objective Medical Evidence(including physical exam finding to support reason for limitations):    Leg contractures  Immobilization syndrome  Bedridden  CVA with  paralysis  Confused/combative: may require restraints    Other: HAND CONTRACTURES, NOT ABLE TO VERBALLY EXPRESS NEEDS.     Physician Signature:           (RN,NP,PA,CAN, Discharge Planner) Date/Time: 1/2/2019 12:15 PM      Printed Name:    __Britany Monteiro RN/JOSE________________________________    [facility]   Phone: [phone] Phone: [phone]   Fax: [phone] Fax: [phone]

## 2019-01-02 NOTE — PLAN OF CARE
Problem: Patient Care Overview  Goal: Plan of Care Review   01/02/19 5612   Coping/Psychosocial   Plan of Care Reviewed With patient   Plan of Care Review   Progress no change   OTHER   Outcome Summary Reevaluation of bedside swallow to attempt advancing diet. Pt on soft; ground diet with nectar liquids. At [facility] facitty pt was on a [facility] soft;/thin liquids. Pt with immediate cough post swallow with thin; suspect penetration or possible aspiration; continuous swallows , (despite verbal cues for single sips) via straw with nectar and cough post swallow. Suspect reduced oral control, mistiming. Pt tolerated solids with good mastication; khalil with nectar liquid wash. Unsure if this was d/t mistiming or from previous coughing. Do not feel pt safe to be advanced to thins at present.

## 2019-01-02 NOTE — DISCHARGE SUMMARY
PHYSICIAN DISCHARGE SUMMARY                                                                        [facility]    Patient Identification:  Name: Carolina Bhat  Age: 58 y.o.  Sex: female  :  1960  MRN: [id number]  Primary Care Physician: Merrill Odonnell MD    Admit date: 2018  Discharge date and time:2019  Discharged Condition: fair    Discharge Diagnoses:  Active Hospital Problems    Diagnosis Date Noted   • **Altered mental status [R41.82] 2018   • Infected decubitus ulcer [L89.90, L08.9] 2018   • Immobility [Z74.09] 2018   • Morbid obesity (CMS/[facility]) [E66.01] 2018   • Sacral ulcer (CMS/[facility]) [L98.429] 2018   • DM (diabetes mellitus) type II controlled with renal manifestation (CMS/[facility]) [E11.29]    • ESRD on hemodialysis (CMS/[facility]) [N18.6, Z99.2] 2016      Resolved Hospital Problems   No resolved problems to display.          PMHX:   Past Medical History:   Diagnosis Date   • Acid reflux    • Anemia    • Anxiety    • Chronic constipation    • Chronic pain    • Coronary artery disease    • CVA (cerebral vascular accident) (CMS/[facility]) ,    • Depression    • DM (diabetes mellitus) type II controlled with renal manifestation (CMS/[facility])    • Dyslipidemia    • Dysphagia     G-tube removed    • ESRD (end stage renal disease) (CMS/[facility]) 2016    Started HD 2016  [facility]   • Flexion contractures    • Hard to intubate     pt does not know   • History of esophagitis    • HLD (hyperlipidemia)    • Hypertension    • 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 (CMS/[facility]) 2017   • Nausea & vomiting    • Open angle glaucoma with borderline intraocular pressure, bilateral 2017   • Venous insufficiency (chronic) (peripheral)      PSHX:   Past Surgical History:   Procedure Laterality Date   • ARTERIOVENOUS FISTULA/SHUNT SURGERY Right  "3/21/2016    Procedure: RT BRACHIAL CEPHALIC FISTULA;  Surgeon: Alla Price Jr., MD;  Location: [facility];  Service:    • BREAST BIOPSY         Hospital Course: Carolina Bhat  is a 58 year old chronically ill female with a history of stroke, diabetes, ESRD, immobility syndrome who is bedbound.  She was found to be confused this morning by EMS who was picking her up to transport her to dialysis.  Her nurses at dialysis felt the same.  They initiated her treatment but this was discontinued in favor of sending her to the emergency room.  She was evaluated in the ER at [place] and had workup for a small sacral decubitus wound with CT scan that showed a shallow ulcer and stranding of the subcutaneous soft tissue.  She was placed on Keflex and discharged back to her nursing home.  Her family came to see her and felt that she was nowhere near her baseline and brought her to the ER here at [facility] for another opinion.  She was last seen normal yesterday evening prior to going to bed.  She is unable to provide any history and family states that she has basically just been repeating herself over and over again.  When I ask her her name she says \"Carolina\" repeatedly.  This is the same answer she gives me for all other questions I attempted to ask her.           The patient was admitted the hospital and seen by neurology and nephrology.  MRI showed old stroke but no acute stroke.  The patient was felt to have metabolic encephalopathy and she was taken off some of her medications and was started on antibiotics for possible infected decubitus.  She complained of pain over her buttocks.  She is feeling better after receiving antibiotics for a few days and was looking well enough to go back to the nursing facility.  She'll follow-up her primary care.  She'll continue to follow-up with dialysis as usual.    Consults:     Consults     Date and Time Order Name Status Description    12/26/2018 2315 Inpatient Nephrology " Consult      12/26/2018 2315 Inpatient Neurology Consult Stroke Completed     12/26/2018 5653 [facility] (on-call MD unless specified) Completed         Results from last 7 days   Lab Units  01/02/19   0441   WBC 10*3/mm3  8.91   HEMOGLOBIN g/dL  12.5   HEMATOCRIT %  40.0   PLATELETS 10*3/mm3  221     Results from last 7 days   Lab Units  01/02/19   0441   SODIUM mmol/L  139   POTASSIUM mmol/L  4.6   CHLORIDE mmol/L  89*   CO2 mmol/L  26.9   BUN mg/dL  36*   CREATININE mg/dL  7.04*   GLUCOSE mg/dL  86   CALCIUM mg/dL  8.3*     Significant Diagnostic Studies:   Lab Results   Component Value Date    WBC 8.91 01/02/2019    HGB 12.5 01/02/2019    HCT 40.0 01/02/2019     01/02/2019     Lab Results   Component Value Date     01/02/2019    K 4.6 01/02/2019    CL 89 (L) 01/02/2019    CO2 26.9 01/02/2019    BUN 36 (H) 01/02/2019    CREATININE 7.04 (H) 01/02/2019    GLUCOSE 86 01/02/2019     Lab Results   Component Value Date    CALCIUM 8.3 (L) 01/02/2019    PHOS 8.1 (H) 01/02/2019     No results found for: AST, ALT, ALKPHOS  No results found for: APTT, INR  No results found for: COLORU, CLARITYU, SPECGRAV, PHUR, PROTEINUR, GLUCOSEU, KETONESU, BLOODU, NITRITE, LEUKOCYTESUR, BILIRUBINUR, UROBILINOGEN, RBCUA, WBCUA, BACTERIA, UACOMMENT  No results found for: TROPONINT, TROPONINI, BNP  No components found for: HGBA1C;2  No components found for: TSH;2  Imaging Results (all)     Procedure Component Value Units Date/Time    MRI Brain Without Contrast [[id number]] Collected:  12/27/18 1121     Updated:  12/27/18 1546    Narrative:       MRI OF THE BRAIN WITHOUT CONTRAST 12/27/2018     CLINICAL HISTORY: 58-year-old female with increasing confusion and  history of stroke back in 2012.     TECHNIQUE: Patient was moving quite a bit and a moving patient protocol  had to be performed consisting of single shot fast spin-echo, axial T2,  fast axial FLAIR and T1 and sagittal T1 and axial diffusion-weighted  images of the head.      COMPARISON: This is correlated to a prior noncontrast head CT yesterday,  12/26/2018, and a prior MRI of the head from [facility]  on 02/04/2012.     FINDINGS: There are patchy nodular areas of T2 high signal in the  periventricular extending to the subcortical white matter of the  cerebral hemispheres consistent with mild-to-moderate small vessel  disease. There is a moderate size area of encephalomalacia tracking  throughout the superior right frontal parietal subcortical white matter  the majority of the right corona radiata region extending into the right  internal capsule and also extending into the posterior lateral right  frontal lobe, the majority of the lateral right parietal lobe extending  into the lateral right temporal occipital region, and the posterior 2/3  of the right insular cortex consistent with an old right middle cerebral  artery territory infarct that measures up to 7.3 x 3.4 x 6 cm in  anterior posterior, medial lateral and craniocaudal dimension. This  occurred back in 02/2012. There is also a small 4 mm old lacunar infarct  in the posterior limb of the left internal capsule. The remainder of the  brain parenchyma is normal in signal intensity. Specifically, no  diffusion-weighted abnormality is seen with no acute infarct identified.  The ventricles are normal in size. I see no mass effect and no midline  shift. No extra-axial fluid collections are identified. There is left  frontal, ethmoid and maxillary circumferential mucosal thickening and  some circumferential mucosal thickening in the sphenoid sinuses  bilaterally. There is partial opacification of the left mastoid air  cells and middle ear cavity with fluid. Good flow voids are demonstrated  in the cerebral vessels and in the dural venous sinuses.       Impression:       1. No acute abnormality is seen with no acute infarct identified.  2. There is a moderate to large old right middle cerebral artery  territory  infarct measuring up to 7.3 x 3.4 x 6 cm in anterior  posterior, medial lateral and craniocaudal dimension involving the  majority of the superior right frontal parietal subcortical white matter  right corona radiata region, portion of the right internal capsule  posterior lateral right frontal lobe, and majority of the lateral right  parietal lobe extending to the lateral right temporal occipital region  posterior 2/3 of the right insular cortex. There is also a 3-4 mm old  lacunar infarct in the posterior limb of the left internal capsule and  these old infarcts occurred back in 02/2012. There is mild to moderate  small vessel disease in the cerebral white matter.   3. There is mild left frontal, ethmoid, maxillary and bilateral sphenoid  sinus mucosal thickening and partial opacification of the left mastoid  and middle ear cavity with fluid. The remainder of the MRI of the brain  is within normal limits.     This report was finalized on 12/27/2018 3:43 PM by Dr. Ar Schmitz M.D.       XR Chest 1 View [[id number]] Collected:  12/26/18 1548     Updated:  12/27/18 0722    Narrative:       AP CHEST 12/26/2018     HISTORY: Altered mental status. Abnormal breath sounds.     FINDINGS:  Heart size is within normal limits. Lungs are underinflated  with some vascular crowding. Patient is rotated to the right. No  definite focal infiltrates are seen. The mediastinal soft tissues are  somewhat prominent but this may be related to rotation and  underinflation. Vascular stent is seen in the right upper hemithorax.       Impression:       1. Prominent soft tissues in the mediastinum could be related to  rotation and underinflation. Recommend repeat chest radiograph with  improved inspiration and no rotation when patient is clinically able.  2. No definite acute infiltrates are seen.     This report was finalized on 12/27/2018 7:19 AM by Dr. Aryan Olivier M.D.       XR Chest 1 View [[id number]] Collected:  12/26/18 2039      Updated:  12/26/18 2103    Narrative:       PORTABLE AP CHEST     HISTORY: 58-year-old male with altered mental status, rhonchi on the  right.     COMPARISON: AP chest 12/26/2018, 03/21/2016.     FINDINGS: Heart size is enlarged. There is prominence of the mediastinum  that appears similar to a prior exam 03/21/2016 and is not as evident as  the prior exam same date most likely due to a less degree of patient  rotation to the right. Lung volumes are diminished and there is mild  basilar atelectasis. Aortic vascular calcifications are present. There  is a right axillary, proximal arm vascular stent.       Impression:       No evidence for active disease in the chest.     This report was finalized on 12/26/2018 9:00 PM by Dr. Danial Ceballos M.D.       CT Head Without Contrast [[id number]] Collected:  12/26/18 1745     Updated:  12/26/18 1845    Narrative:       CT HEAD WITHOUT CONTRAST     HISTORY: 58-year-old female with increased confusion.     TECHNIQUE:  Head CT includes axial imaging from the base of skull to the  vertex without intravenous contrast. Radiation dose reduction techniques  were utilized, including automated exposure control and exposure  modulation based on body size.     COMPARISON: Head CT without contrast 06/13/2016.      FINDINGS: There is a large chronic area of encephalomalacia involving  the right cerebral hemisphere within the right middle cerebral artery  distribution consistent with an old stroke. There is ex vacuo dilatation  of the right lateral ventricle. There is also prominence of the  extra-axial space anterior to both frontal lobes and this appears  chronic and similar to prior examination 06/2016. There are no abnormal  areas of increased attenuation intra-axially to suggest hemorrhage.  There is mild left-to-right midline shift associated with right cerebral  encephalomalacia. There is also central atrophy with prominence of the  sulci and ventricular enlargement. Bone windows  demonstrate no osseous  abnormality or fracture. There is partial opacification of the left  mastoid air cells and the left middle ear and this was also demonstrated  on the previous exam 06/2016. Moderate ethmoid and mild left greater  than right maxillary sinus mucosal thickening is present.       Impression:       1. No evidence for acute intracranial abnormality.  2. Chronic encephalomalacia right cerebral hemisphere associated with a  chronic right MCA distribution infarction with ex vacuo dilatation of  the right lateral ventricle.  3. Partial opacification of the left mastoid air cells and left middle  ear cavity without change. There is also mild to moderate inflammatory  paranasal sinus disease.     This report was finalized on 12/26/2018 6:42 PM by Dr. Danial Ceballos M.D.           Lab Results (last 7 days)     Procedure Component Value Units Date/Time    POC Glucose Once [[id number]]  (Abnormal) Collected:  01/02/19 1140    Specimen:  Blood Updated:  01/02/19 1142     Glucose 148 mg/dL     POC Glucose Once [[id number]]  (Normal) Collected:  01/02/19 0603    Specimen:  Blood Updated:  01/02/19 0605     Glucose 99 mg/dL     Renal Function Panel [[id number]]  (Abnormal) Collected:  01/02/19 0441    Specimen:  Blood Updated:  01/02/19 0553     Glucose 86 mg/dL      BUN 36 mg/dL      Creatinine 7.04 mg/dL      Sodium 139 mmol/L      Potassium 4.6 mmol/L      Chloride 89 mmol/L      CO2 26.9 mmol/L      Calcium 8.3 mg/dL      Albumin 3.40 g/dL      Phosphorus 8.1 mg/dL      Anion Gap 23.1 mmol/L      BUN/Creatinine Ratio 5.1     eGFR Non African Amer -- mL/min/1.73      Comment: <15 Indicative of kidney failure.        eGFR   Amer 7 mL/min/1.73      Comment: <15 Indicative of kidney failure.       CBC & Differential [[id number]] Collected:  01/02/19 0441    Specimen:  Blood Updated:  01/02/19 0524    Narrative:       The following orders were created for panel order CBC & Differential.  Procedure                                Abnormality         Status                     ---------                               -----------         ------                     CBC Auto Differential[[id number]]        Abnormal            Final result                 Please view results for these tests on the individual orders.    CBC Auto Differential [[id number]]  (Abnormal) Collected:  01/02/19 0441    Specimen:  Blood Updated:  01/02/19 0524     WBC 8.91 10*3/mm3      RBC 4.23 10*6/mm3      Hemoglobin 12.5 g/dL      Hematocrit 40.0 %      MCV 94.6 fL      MCH 29.6 pg      MCHC 31.3 g/dL      RDW 17.2 %      RDW-SD 59.7 fl      MPV 10.2 fL      Platelets 221 10*3/mm3      Neutrophil % 55.9 %      Lymphocyte % 36.5 %      Monocyte % 5.3 %      Eosinophil % 1.8 %      Basophil % 0.3 %      Immature Grans % 0.2 %      Neutrophils, Absolute 4.98 10*3/mm3      Lymphocytes, Absolute 3.25 10*3/mm3      Monocytes, Absolute 0.47 10*3/mm3      Eosinophils, Absolute 0.16 10*3/mm3      Basophils, Absolute 0.03 10*3/mm3      Immature Grans, Absolute 0.02 10*3/mm3     POC Glucose Once [[id number]]  (Abnormal) Collected:  01/01/19 2100    Specimen:  Blood Updated:  01/01/19 2102     Glucose 134 mg/dL     POC Glucose Once [[id number]]  (Normal) Collected:  01/01/19 1714    Specimen:  Blood Updated:  01/01/19 1715     Glucose 108 mg/dL     POC Glucose Once [[id number]]  (Abnormal) Collected:  01/01/19 1215    Specimen:  Blood Updated:  01/01/19 1217     Glucose 152 mg/dL     Renal Function Panel [[id number]]  (Abnormal) Collected:  01/01/19 0619    Specimen:  Blood Updated:  01/01/19 0734     Glucose 174 mg/dL      BUN 29 mg/dL      Creatinine 5.63 mg/dL      Sodium 139 mmol/L      Potassium 4.3 mmol/L      Chloride 92 mmol/L      CO2 29.3 mmol/L      Calcium 8.4 mg/dL      Albumin 3.50 g/dL      Phosphorus 7.8 mg/dL      Anion Gap 17.7 mmol/L      BUN/Creatinine Ratio 5.2     eGFR Non African Amer -- mL/min/1.73      Comment: <15 Indicative of kidney  failure.        eGFR  African Amer 9 mL/min/1.73      Comment: <15 Indicative of kidney failure.       CBC & Differential [[id number]] Collected:  01/01/19 0619    Specimen:  Blood Updated:  01/01/19 0722    Narrative:       The following orders were created for panel order CBC & Differential.  Procedure                               Abnormality         Status                     ---------                               -----------         ------                     CBC Auto Differential[[id number]]        Abnormal            Final result                 Please view results for these tests on the individual orders.    CBC Auto Differential [[id number]]  (Abnormal) Collected:  01/01/19 0619    Specimen:  Blood Updated:  01/01/19 0722     WBC 8.65 10*3/mm3      RBC 4.29 10*6/mm3      Hemoglobin 12.6 g/dL      Hematocrit 41.2 %      MCV 96.0 fL      MCH 29.4 pg      MCHC 30.6 g/dL      RDW 17.3 %      RDW-SD 61.1 fl      MPV 10.3 fL      Platelets 236 10*3/mm3      Neutrophil % 58.5 %      Lymphocyte % 32.8 %      Monocyte % 7.4 %      Eosinophil % 1.0 %      Basophil % 0.3 %      Immature Grans % 0.0 %      Neutrophils, Absolute 5.05 10*3/mm3      Lymphocytes, Absolute 2.84 10*3/mm3      Monocytes, Absolute 0.64 10*3/mm3      Eosinophils, Absolute 0.09 10*3/mm3      Basophils, Absolute 0.03 10*3/mm3      Immature Grans, Absolute 0.00 10*3/mm3     POC Glucose Once [[id number]]  (Abnormal) Collected:  01/01/19 0603    Specimen:  Blood Updated:  01/01/19 0605     Glucose 168 mg/dL     Blood Culture - Blood, Hand, Left [[id number]] Collected:  12/26/18 2049    Specimen:  Blood from Hand, Left Updated:  12/31/18 2130     Blood Culture No growth at 5 days    POC Glucose Once [[id number]]  (Abnormal) Collected:  12/31/18 2112    Specimen:  Blood Updated:  12/31/18 2113     Glucose 231 mg/dL     Blood Culture - Blood, Arm, Left [[id number]] Collected:  12/26/18 2004    Specimen:  Blood from Arm, Left Updated:  12/31/18 2030     Blood  Culture No growth at 5 days    POC Glucose Once [[id number]]  (Abnormal) Collected:  12/31/18 1641    Specimen:  Blood Updated:  12/31/18 1642     Glucose 184 mg/dL     POC Glucose Once [[id number]]  (Abnormal) Collected:  12/31/18 1127    Specimen:  Blood Updated:  12/31/18 1128     Glucose 204 mg/dL     POC Glucose Once [[id number]]  (Abnormal) Collected:  12/31/18 0706    Specimen:  Blood Updated:  12/31/18 0708     Glucose 202 mg/dL     Sedimentation Rate [[id number]]  (Abnormal) Collected:  12/31/18 0439    Specimen:  Blood Updated:  12/31/18 0607     Sed Rate 43 mm/hr     Basic Metabolic Panel [[id number]]  (Abnormal) Collected:  12/31/18 0439    Specimen:  Blood Updated:  12/31/18 0545     Glucose 202 mg/dL      BUN 17 mg/dL      Creatinine 4.08 mg/dL      Sodium 141 mmol/L      Potassium 3.8 mmol/L      Chloride 93 mmol/L      CO2 26.9 mmol/L      Calcium 9.6 mg/dL      eGFR  African Amer 14 mL/min/1.73      Comment: <15 Indicative of kidney failure.        eGFR Non African Amer -- mL/min/1.73      Comment: <15 Indicative of kidney failure.        BUN/Creatinine Ratio 4.2     Anion Gap 21.1 mmol/L     Narrative:       GFR Normal >60  Chronic Kidney Disease <60  Kidney Failure <15    C-reactive Protein [[id number]]  (Abnormal) Collected:  12/31/18 0439    Specimen:  Blood Updated:  12/31/18 0545     C-Reactive Protein 1.75 mg/dL     CBC & Differential [[id number]] Collected:  12/31/18 0439    Specimen:  Blood Updated:  12/31/18 0522    Narrative:       The following orders were created for panel order CBC & Differential.  Procedure                               Abnormality         Status                     ---------                               -----------         ------                     CBC Auto Differential[[id number]]        Abnormal            Final result                 Please view results for these tests on the individual orders.    CBC Auto Differential [[id number]]  (Abnormal) Collected:  12/31/18 0439     Specimen:  Blood Updated:  12/31/18 0522     WBC 8.32 10*3/mm3      RBC 4.61 10*6/mm3      Hemoglobin 13.9 g/dL      Hematocrit 43.8 %      MCV 95.0 fL      MCH 30.2 pg      MCHC 31.7 g/dL      RDW 17.3 %      RDW-SD 60.2 fl      MPV 10.4 fL      Platelets 242 10*3/mm3      Neutrophil % 59.4 %      Lymphocyte % 32.1 %      Monocyte % 7.5 %      Eosinophil % 0.8 %      Basophil % 0.2 %      Immature Grans % 0.0 %      Neutrophils, Absolute 4.94 10*3/mm3      Lymphocytes, Absolute 2.67 10*3/mm3      Monocytes, Absolute 0.62 10*3/mm3      Eosinophils, Absolute 0.07 10*3/mm3      Basophils, Absolute 0.02 10*3/mm3      Immature Grans, Absolute 0.00 10*3/mm3     POC Glucose Once [[id number]]  (Abnormal) Collected:  12/30/18 2111    Specimen:  Blood Updated:  12/30/18 2112     Glucose 198 mg/dL     POC Glucose Once [[id number]]  (Abnormal) Collected:  12/30/18 1612    Specimen:  Blood Updated:  12/30/18 1613     Glucose 178 mg/dL     POC Glucose Once [[id number]]  (Abnormal) Collected:  12/30/18 1138    Specimen:  Blood Updated:  12/30/18 1140     Glucose 180 mg/dL     Basic Metabolic Panel [[id number]]  (Abnormal) Collected:  12/30/18 0538    Specimen:  Blood Updated:  12/30/18 0656     Glucose 194 mg/dL      BUN 32 mg/dL      Creatinine 5.52 mg/dL      Sodium 141 mmol/L      Potassium 4.0 mmol/L      Chloride 96 mmol/L      CO2 23.4 mmol/L      Calcium 8.8 mg/dL      eGFR  African Amer 10 mL/min/1.73      Comment: <15 Indicative of kidney failure.        eGFR Non African Amer -- mL/min/1.73      Comment: <15 Indicative of kidney failure.        BUN/Creatinine Ratio 5.8     Anion Gap 21.6 mmol/L     Narrative:       GFR Normal >60  Chronic Kidney Disease <60  Kidney Failure <15    CBC & Differential [[id number]] Collected:  12/30/18 0538    Specimen:  Blood Updated:  12/30/18 0636    Narrative:       The following orders were created for panel order CBC & Differential.  Procedure                               Abnormality          Status                     ---------                               -----------         ------                     CBC Auto Differential[[id number]]        Abnormal            Final result                 Please view results for these tests on the individual orders.    CBC Auto Differential [[id number]]  (Abnormal) Collected:  12/30/18 0538    Specimen:  Blood Updated:  12/30/18 0636     WBC 7.72 10*3/mm3      RBC 4.34 10*6/mm3      Hemoglobin 13.1 g/dL      Hematocrit 41.3 %      MCV 95.2 fL      MCH 30.2 pg      MCHC 31.7 g/dL      RDW 17.4 %      RDW-SD 60.9 fl      MPV 10.1 fL      Platelets 219 10*3/mm3      Neutrophil % 48.9 %      Lymphocyte % 41.1 %      Monocyte % 8.0 %      Eosinophil % 1.6 %      Basophil % 0.4 %      Immature Grans % 0.0 %      Neutrophils, Absolute 3.78 10*3/mm3      Lymphocytes, Absolute 3.17 10*3/mm3      Monocytes, Absolute 0.62 10*3/mm3      Eosinophils, Absolute 0.12 10*3/mm3      Basophils, Absolute 0.03 10*3/mm3      Immature Grans, Absolute 0.00 10*3/mm3     POC Glucose Once [[id number]]  (Abnormal) Collected:  12/30/18 0555    Specimen:  Blood Updated:  12/30/18 0557     Glucose 186 mg/dL     POC Glucose Once [[id number]]  (Abnormal) Collected:  12/29/18 2052    Specimen:  Blood Updated:  12/29/18 2058     Glucose 218 mg/dL     POC Glucose Once [[id number]]  (Abnormal) Collected:  12/29/18 1720    Specimen:  Blood Updated:  12/29/18 1722     Glucose 212 mg/dL     POC Glucose Once [[id number]]  (Normal) Collected:  12/29/18 1111    Specimen:  Blood Updated:  12/29/18 1113     Glucose 129 mg/dL     POC Glucose Once [[id number]]  (Abnormal) Collected:  12/29/18 0650    Specimen:  Blood Updated:  12/29/18 0652     Glucose 201 mg/dL     POC Glucose Once [[id number]]  (Abnormal) Collected:  12/28/18 2058    Specimen:  Blood Updated:  12/28/18 2100     Glucose 174 mg/dL     POC Glucose Once [[id number]]  (Abnormal) Collected:  12/28/18 1113    Specimen:  Blood Updated:  12/28/18 1130      Glucose 209 mg/dL     Basic Metabolic Panel [[id number]]  (Abnormal) Collected:  12/28/18 0440    Specimen:  Blood Updated:  12/28/18 0557     Glucose 114 mg/dL      BUN 54 mg/dL      Creatinine 5.89 mg/dL      Sodium 139 mmol/L      Potassium 4.1 mmol/L      Chloride 97 mmol/L      CO2 23.3 mmol/L      Calcium 8.4 mg/dL      eGFR  African Amer 9 mL/min/1.73      Comment: <15 Indicative of kidney failure.        eGFR Non African Amer -- mL/min/1.73      Comment: <15 Indicative of kidney failure.        BUN/Creatinine Ratio 9.2     Anion Gap 18.7 mmol/L     Narrative:       GFR Normal >60  Chronic Kidney Disease <60  Kidney Failure <15    POC Glucose Once [[id number]]  (Abnormal) Collected:  12/28/18 0554    Specimen:  Blood Updated:  12/28/18 0556     Glucose 134 mg/dL     CBC & Differential [[id number]] Collected:  12/28/18 0440    Specimen:  Blood Updated:  12/28/18 0553    Narrative:       The following orders were created for panel order CBC & Differential.  Procedure                               Abnormality         Status                     ---------                               -----------         ------                     CBC Auto Differential[[id number]]        Abnormal            Final result                 Please view results for these tests on the individual orders.    CBC Auto Differential [[id number]]  (Abnormal) Collected:  12/28/18 0440    Specimen:  Blood Updated:  12/28/18 0553     WBC 6.97 10*3/mm3      RBC 4.14 10*6/mm3      Hemoglobin 12.2 g/dL      Hematocrit 37.0 %      MCV 89.4 fL      MCH 29.5 pg      MCHC 33.0 g/dL      RDW 17.4 %      RDW-SD 57.1 fl      MPV 10.4 fL      Platelets 182 10*3/mm3      Neutrophil % 58.5 %      Lymphocyte % 31.0 %      Monocyte % 8.8 %      Eosinophil % 1.6 %      Basophil % 0.1 %      Immature Grans % 0.1 %      Neutrophils, Absolute 4.08 10*3/mm3      Lymphocytes, Absolute 2.16 10*3/mm3      Monocytes, Absolute 0.61 10*3/mm3      Eosinophils, Absolute 0.11  10*3/mm3      Basophils, Absolute 0.01 10*3/mm3      Immature Grans, Absolute 0.01 10*3/mm3     POC Glucose Once [[id number]]  (Abnormal) Collected:  12/27/18 2102    Specimen:  Blood Updated:  12/27/18 2104     Glucose 172 mg/dL     POC Glucose Once [[id number]]  (Abnormal) Collected:  12/27/18 1907    Specimen:  Blood Updated:  12/27/18 1908     Glucose 193 mg/dL     POC Glucose Once [[id number]]  (Abnormal) Collected:  12/27/18 1154    Specimen:  Blood Updated:  12/27/18 1156     Glucose 145 mg/dL     Vitamin B12 [[id number]]  (Normal) Collected:  12/27/18 0455    Specimen:  Blood Updated:  12/27/18 0648     Vitamin B-12 856 pg/mL     Folate [[id number]]  (Normal) Collected:  12/27/18 0455    Specimen:  Blood Updated:  12/27/18 0648     Folate >20.00 ng/mL     TSH [[id number]]  (Normal) Collected:  12/27/18 0455    Specimen:  Blood Updated:  12/27/18 0644     TSH 0.823 mIU/mL     Basic Metabolic Panel [[id number]]  (Abnormal) Collected:  12/27/18 0455    Specimen:  Blood Updated:  12/27/18 0633     Glucose 140 mg/dL      BUN 43 mg/dL      Creatinine 4.97 mg/dL      Sodium 137 mmol/L      Potassium 3.8 mmol/L      Chloride 95 mmol/L      CO2 26.4 mmol/L      Calcium 8.9 mg/dL      eGFR  African Amer 11 mL/min/1.73      Comment: <15 Indicative of kidney failure.        eGFR Non African Amer -- mL/min/1.73      Comment: <15 Indicative of kidney failure.        BUN/Creatinine Ratio 8.7     Anion Gap 15.6 mmol/L     Narrative:       GFR Normal >60  Chronic Kidney Disease <60  Kidney Failure <15    Lipid Panel [[id number]]  (Abnormal) Collected:  12/27/18 0455    Specimen:  Blood Updated:  12/27/18 0633     Total Cholesterol 139 mg/dL      Triglycerides 229 mg/dL      HDL Cholesterol 26 mg/dL      LDL Cholesterol  67 mg/dL      VLDL Cholesterol 45.8 mg/dL      LDL/HDL Ratio 2.58    Narrative:       Cholesterol Reference Ranges  (U.[place] Department of Health and Human Services ATP III Classifications)    Desirable          <200  mg/dL  Borderline High    200-239 mg/dL  High Risk          >240 mg/dL      Triglyceride Reference Ranges  (U.S. Department of Health and Human Services ATP III Classifications)    Normal           <150 mg/dL  Borderline High  150-199 mg/dL  High             200-499 mg/dL  Very High        >500 mg/dL    HDL Reference Ranges  (U.S. Department of Health and Human Services ATP III Classifcations)    Low     <40 mg/dl (major risk factor for CHD)  High    >60 mg/dl ('negative' risk factor for CHD)        LDL Reference Ranges  (U.S. Department of Health and Human Services ATP III Classifcations)    Optimal          <100 mg/dL  Near Optimal     100-129 mg/dL  Borderline High  130-159 mg/dL  High             160-189 mg/dL  Very High        >189 mg/dL    Hemoglobin A1c [[id number]]  (Abnormal) Collected:  12/27/18 0455    Specimen:  Blood Updated:  12/27/18 0616     Hemoglobin A1C 7.30 %     Narrative:       Hemoglobin A1C Ranges:    Increased Risk for Diabetes  5.7% to 6.4%  Diabetes                     >= 6.5%  Diabetic Goal                < 7.0%    CBC & Differential [[id number]] Collected:  12/27/18 0455    Specimen:  Blood Updated:  12/27/18 0615    Narrative:       The following orders were created for panel order CBC & Differential.  Procedure                               Abnormality         Status                     ---------                               -----------         ------                     CBC Auto Differential[[id number]]        Abnormal            Final result                 Please view results for these tests on the individual orders.    CBC Auto Differential [[id number]]  (Abnormal) Collected:  12/27/18 0455    Specimen:  Blood Updated:  12/27/18 0615     WBC 5.01 10*3/mm3      RBC 4.21 10*6/mm3      Hemoglobin 12.4 g/dL      Hematocrit 37.5 %      MCV 89.1 fL      MCH 29.5 pg      MCHC 33.1 g/dL      RDW 17.3 %      RDW-SD 56.7 fl      MPV 10.4 fL      Platelets 202 10*3/mm3      Neutrophil % 55.7 %       Lymphocyte % 34.5 %      Monocyte % 8.4 %      Eosinophil % 1.0 %      Basophil % 0.4 %      Immature Grans % 0.2 %      Neutrophils, Absolute 2.79 10*3/mm3      Lymphocytes, Absolute 1.73 10*3/mm3      Monocytes, Absolute 0.42 10*3/mm3      Eosinophils, Absolute 0.05 10*3/mm3      Basophils, Absolute 0.02 10*3/mm3      Immature Grans, Absolute 0.01 10*3/mm3     POC Glucose Once [[id number]]  (Abnormal) Collected:  12/27/18 0559    Specimen:  Blood Updated:  12/27/18 0600     Glucose 144 mg/dL     POC Glucose Once [[id number]]  (Abnormal) Collected:  12/26/18 2052    Specimen:  Blood Updated:  12/26/18 2054     Glucose 143 mg/dL     Valproic Acid Level, Total [[id number]]  (Abnormal) Collected:  12/26/18 1517    Specimen:  Blood Updated:  12/26/18 1612     Valproic Acid <2.8 mcg/mL     Urinalysis With Microscopic If Indicated (No Culture) - Urine, Catheter [[id number]]  (Abnormal) Collected:  12/26/18 1546    Specimen:  Urine, Catheter Updated:  12/26/18 1609     Color, UA Yellow     Appearance, UA Clear     pH, UA 8.0     Specific Gravity, UA 1.016     Glucose,  mg/dL (Trace)     Ketones, UA Trace     Bilirubin, UA Negative     Blood, UA Negative     Protein, UA >=300 mg/dL (3+)     Leuk Esterase, UA Trace     Nitrite, UA Negative     Urobilinogen, UA 0.2 E.U./dL    Urinalysis, Microscopic Only - Urine, Catheter [[id number]]  (Abnormal) Collected:  12/26/18 1546    Specimen:  Urine, Catheter Updated:  12/26/18 1609     RBC, UA 3-5 /HPF      WBC, UA 0-2 /HPF      Bacteria, UA None Seen /HPF      Squamous Epithelial Cells, UA 3-6 /HPF      Hyaline Casts, UA 0-2 /LPF      Methodology Manual Light Microscopy    Basic Metabolic Panel [[id number]]  (Abnormal) Collected:  12/26/18 1517    Specimen:  Blood Updated:  12/26/18 1600     Glucose 179 mg/dL      BUN 38 mg/dL      Creatinine 4.47 mg/dL      Sodium 138 mmol/L      Potassium 4.1 mmol/L      Chloride 92 mmol/L      CO2 30.5 mmol/L      Calcium 8.8 mg/dL      eGFR    Amer 12 mL/min/1.73      Comment: <15 Indicative of kidney failure.        eGFR Non African Amer -- mL/min/1.73      Comment: <15 Indicative of kidney failure.        BUN/Creatinine Ratio 8.5     Anion Gap 15.5 mmol/L     Narrative:       GFR Normal >60  Chronic Kidney Disease <60  Kidney Failure <15    Phosphorus [[id number]]  (Normal) Collected:  12/26/18 1517    Specimen:  Blood Updated:  12/26/18 1600     Phosphorus 3.6 mg/dL     Ammonia [[id number]]  (Normal) Collected:  12/26/18 1520    Specimen:  Blood Updated:  12/26/18 1554     Ammonia 31 umol/L     CBC & Differential [[id number]] Collected:  12/26/18 1517    Specimen:  Blood Updated:  12/26/18 1539    Narrative:       The following orders were created for panel order CBC & Differential.  Procedure                               Abnormality         Status                     ---------                               -----------         ------                     CBC Auto Differential[[id number]]         Abnormal            Final result                 Please view results for these tests on the individual orders.    CBC Auto Differential [[id number]]  (Abnormal) Collected:  12/26/18 1517    Specimen:  Blood Updated:  12/26/18 1539     WBC 5.73 10*3/mm3      RBC 4.29 10*6/mm3      Hemoglobin 12.8 g/dL      Hematocrit 38.1 %      MCV 88.8 fL      MCH 29.8 pg      MCHC 33.6 g/dL      RDW 17.1 %      RDW-SD 55.8 fl      MPV 10.3 fL      Platelets 197 10*3/mm3      Neutrophil % 62.4 %      Lymphocyte % 30.0 %      Monocyte % 6.5 %      Eosinophil % 0.9 %      Basophil % 0.2 %      Immature Grans % 0.2 %      Neutrophils, Absolute 3.58 10*3/mm3      Lymphocytes, Absolute 1.72 10*3/mm3      Monocytes, Absolute 0.37 10*3/mm3      Eosinophils, Absolute 0.05 10*3/mm3      Basophils, Absolute 0.01 10*3/mm3      Immature Grans, Absolute 0.01 10*3/mm3         /75 (BP Location: Right arm, Patient Position: Lying)   Pulse 82   Temp 98.2 °F (36.8 °C) (Oral)   Resp 18    "Ht 167.6 cm (66\")   Wt 97.2 kg (214 lb 4.6 oz)   SpO2 93%   BMI 34.59 kg/m²     Discharge Exam:  General Appearance:    Alert, cooperative, no distress                          Head:    Normocephalic, without obvious abnormality, atraumatic                          Eyes:                            Throat:   Lips, tongue, gums normal                          Neck:   Supple, symmetrical, trachea midline, no JVD                        Lungs:     Clear to auscultation bilaterally, respirations unlabored                Chest Wall:    No tenderness or deformity                        Heart:    Regular rate and rhythm, S1 and S2 normal, no murmur,no  Rub or gallop                  Abdomen:     Soft, non-tender, bowel sounds active, no masses, no organomegaly                  Extremities:   Extremities normal, atraumatic, no cyanosis or edema                             Skin:   Skin is warm and dry,  no rashes or palpable lesions                  Neurologic:   She is weak and she's had previous stroke     Disposition:  Skilled nursing facility    Patient Instructions:      Discharge Medications      Changes to Medications      Instructions Start Date   HYDROcodone-acetaminophen 7.5-325 MG per tablet  Commonly known as:  DWAYNE  What changed:    · when to take this  · Another medication with the same name was removed. Continue taking this medication, and follow the directions you see here.   1 tablet, Oral, Every 6 Hours PRN         Continue These Medications      Instructions Start Date   acetaminophen 500 MG tablet  Commonly known as:  TYLENOL   500 mg, Oral, Every 6 Hours PRN      bisacodyl 10 MG suppository  Commonly known as:  DULCOLAX   10 mg, Rectal, Daily PRN      buPROPion  MG 12 hr tablet  Commonly known as:  WELLBUTRIN SR   100 mg, Oral, 2 Times Daily      carvedilol 12.5 MG tablet  Commonly known as:  COREG   12.5 mg, Oral, 2 Times Daily With Meals      docusate sodium 250 MG capsule  Commonly known as:  " COLACE   250 mg, Oral, Daily      doxazosin 4 MG tablet  Commonly known as:  CARDURA   4 mg, Oral, Daily      ferrous sulfate 325 (65 FE) MG tablet   325 mg, Oral, 2 Times Daily      insulin aspart 100 UNIT/ML solution pen-injector sc pen  Commonly known as:  novoLOG FLEXPEN   Subcutaneous, 3 Times Daily With Meals, Per SS: 200-249=2, 250-299=4, 300-349=6, 350-399=8, 400+=10      insulin glargine 100 UNIT/ML injection  Commonly known as:  LANTUS   18 Units, Subcutaneous, Every Morning      lactulose 10 GM/15ML solution  Commonly known as:  CHRONULAC   20 g, Oral, 2 Times Daily      LIPITOR 20 MG tablet  Generic drug:  atorvastatin   60 mg, Oral, Daily      mirtazapine 15 MG tablet  Commonly known as:  REMERON   15 mg, Oral, Nightly      multivitamin with minerals tablet tablet   1 tablet, Oral, Daily      polyethylene glycol packet  Commonly known as:  MIRALAX   17 g, Oral, Daily      sevelamer 800 MG tablet  Commonly known as:  RENVELA   800 mg, Oral, 3 Times Daily With Meals      simethicone 80 MG chewable tablet  Commonly known as:  MYLICON   80 mg, Oral, 3 Times Daily      ZOFRAN 4 MG tablet  Generic drug:  ondansetron   2 mg, Oral, Every 8 Hours PRN         Stop These Medications    amLODIPine 10 MG tablet  Commonly known as:  NORVASC     CloNIDine 0.2 MG tablet  Commonly known as:  CATAPRES     cyclobenzaprine 10 MG tablet  Commonly known as:  FLEXERIL     hydrALAZINE 100 MG tablet  Commonly known as:  APRESOLINE     metoclopramide 10 MG tablet  Commonly known as:  REGLAN     nystatin 220936 UNIT/GM powder  Commonly known as:  MYCOSTATIN          No future appointments.   Contact information for follow-up providers     Merrill Odonnell MD .    Specialty:  Family Medicine  Contact information:  [address] [address]  [phone]                   Contact information for after-discharge care     Destination     [facility] Follow up.    Service:  Intermediate Care  Contact  information:  [address]  [place] [address]  [phone]                           Discharge Order (From admission, onward)    Start     Ordered    01/02/19 1451  Discharge patient  Once     Comments:  Okay to return to skilled nursing facility after finishes dialysis today.   Expected Discharge Date:  01/02/19    Expected Discharge Time:  Evening    Discharge Disposition:  Skilled Nursing Facility (DC - External)    Physician of Record for Attribution - Please select from Treatment Team:  MAX MENDOZA [[id number]]    Review needed by CMO to determine Physician of Record:  No       Question Answer Comment   Physician of Record for Attribution - Please select from Treatment Team MAX MENDOZA    Review needed by CMO to determine Physician of Record No        01/02/19 1456          Total time spent discharging patient including evaluation,post hospitalization follow up,  medication and post hospitalization instructions and education total time exceeds 30 minutes.    Signed:  Max Mendoza MD  1/2/2019  2:56 PM

## 2019-01-02 NOTE — THERAPY RE-EVALUATION
Acute Care - Speech Language Pathology   Swallow Re-Evaluation [facility]     Patient Name: Carolina Bhat  : 1960  MRN: [id number]  Today's Date: 2019               Admit Date: 2018    Visit Dx:     ICD-10-CM ICD-9-CM   1. Altered mental status, unspecified altered mental status type R41.82 780.97   2. ESRD on hemodialysis (CMS/Formerly [facility]) N18.6 585.6    Z99.2 V45.11     Patient Active Problem List   Diagnosis   • DM (diabetes mellitus) type II controlled with renal manifestation (CMS/Formerly [facility])   • Anemia of chronic renal failure, stage 3 (moderate) (CMS/Formerly [facility])   • Diabetic polyneuropathy associated with type 2 diabetes mellitus (CMS/Formerly [facility])   • ESRD on hemodialysis (CMS/Formerly [facility])   • HLD (hyperlipidemia)   • Altered mental status   • Immobility   • Morbid obesity (CMS/Formerly [facility])   • Sacral ulcer (CMS/Formerly [facility])   • Infected decubitus ulcer     Past Medical History:   Diagnosis Date   • Acid reflux    • Anemia    • Anxiety    • Chronic constipation    • Chronic pain    • Coronary artery disease    • CVA (cerebral vascular accident) (CMS/Formerly [facility]) ,    • Depression    • DM (diabetes mellitus) type II controlled with renal manifestation (CMS/Formerly [facility])    • Dyslipidemia    • Dysphagia     G-tube removed    • ESRD (end stage renal disease) (CMS/Formerly [facility]) 2016    Started HD   [facility]   • Flexion contractures    • Hard to intubate     pt does not know   • History of esophagitis    • HLD (hyperlipidemia)    • Hypertension    • 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 (CMS/Formerly [facility]) 2017   • Nausea & vomiting    • Open angle glaucoma with borderline intraocular pressure, bilateral 2017   • Venous insufficiency (chronic) (peripheral)      Past Surgical History:   Procedure Laterality Date   • ARTERIOVENOUS FISTULA/SHUNT SURGERY Right 3/21/2016    Procedure: RT BRACHIAL CEPHALIC FISTULA;  Surgeon: Alla Price Jr., MD;  Location: [facility] OR;  Service:    • BREAST BIOPSY           SWALLOW EVALUATION (last 72 hours)      SLP Adult Swallow Evaluation     Row Name 01/02/19 1100                   Rehab Evaluation    Document Type  re-evaluation  -SA        Subjective Information  no complaints  -SA        Patient Observations  alert;cooperative;agree to therapy  -SA        Patient/Family Observations  Speech is clear; contractured hands  -SA        Patient Effort  good  -SA        Symptoms Noted During/After Treatment  none  -SA           General Information    Patient Profile Reviewed  yes  -SA        Pertinent History Of Current Problem  reeval of swallow to attempt upgrade of diet  -SA        Current Method of Nutrition  soft textures;ground;nectar/syrup-thick liquids  -SA        Precautions/Limitations, Vision  WFL;for purposes of eval  -SA        Precautions/Limitations, Hearing  WFL;for purposes of eval  -SA        Prior Level of Function-Communication  cognitive-linguistic impairment;expressive language impairment  -SA        Prior Level of Function-Swallowing  mechanical soft textures;thin liquids;other (see comments) per extended care facility  -SA        Plans/Goals Discussed with  patient;agreed upon  -SA        Barriers to Rehab  previous functional deficit;medically complex  -SA        Patient's Goals for Discharge  patient did not state  -SA           Pain Scale: Numbers Pre/Post-Treatment    Pain Scale: Numbers, Pretreatment  5/10  -SA        Pain Scale: Numbers, Post-Treatment  0/10 - no pain  -SA        Pain Location  other (see comments) leg  -SA        Pre/Post Treatment Pain Comment  repositioned pt in bed; pt verbalized she felt better  -SA           Oral Motor and Function    Dentition Assessment  natural, present and adequate  -SA        Secretion Management  WNL/WFL  -SA        Mucosal Quality  moist, healthy  -SA           Oral Musculature and Cranial Nerve Assessment    Oral Motor General Assessment  other (see comments) Pt not able to follow commands  -SA         Mandibular Impairment Detail, Cranial Nerve V (Trigeminal)  other (see comments)  -SA           General Eating/Swallowing Observations    Eating/Swallowing Skills  fed by SLP  -SA        Positioning During Eating  upright 90 degree;upright in bed  -SA        Utensils Used  spoon;cup;straw  -SA        Consistencies Trialed  regular textures;thin liquids;nectar/syrup-thick liquids  -SA           Respiratory    Respiratory Status  WFL  -SA           Clinical Swallow Eval    Oral Prep Phase  impaired  -SA        Oral Transit  impaired  -SA        Oral Residue  WFL  -SA        Pharyngeal Phase  suspected pharyngeal impairment  -SA        Esophageal Phase  unremarkable  -SA        Clinical Swallow Evaluation Summary  Reevaluation of bedside swallow to attempt advancing diet. Pt on soft; ground diet with nectar liquids. At [facility] facitlity pt was on a [facility] soft;/thin liquids. Pt with immediate cough post swallow with thin; suspect penetration or possible aspiration; continuous swallows , (despite verbal cues for single sips) via straw with nectar and cough post swallow. Suspect reduced oral control, mistiming. Pt tolerated solids with good mastication; khalil with nectar liquid wash. Unsure if this was d/t mistiming or from previous coughing.  Do not feel pt safe to be advanced to thins at present.    -SA           Clinical Impression    SLP Swallowing Diagnosis  suspected pharyngeal dysfunction  -SA        Functional Impact  risk of aspiration/pneumonia  -SA        Rehab Potential/Prognosis, Swallowing  good, to achieve stated therapy goals  -SA        Swallow Criteria for Skilled Therapeutic Interventions Met  demonstrates skilled criteria  -SA           Recommendations    Therapy Frequency (Swallow)  PRN  -SA        Predicted Duration Therapy Intervention (Days)  until discharge  -SA        SLP Diet Recommendation  soft textures;ground;nectar thick liquids;water between meals after oral care, with supervision  -         Recommended Diagnostics  reassess via clinical swallow evaluation  -SA        Recommended Precautions and Strategies  small bites of food and sips of liquid;upright posture during/after eating;no straw;alternate between small bites of food and sips of liquid  -SA        SLP Rec. for Method of Medication Administration  meds whole;meds crushed;with thick liquids;with pudding or applesauce  -        Monitor for Signs of Aspiration  yes;notify SLP if any concerns  -SA        Anticipated Dischage Disposition  anticipate therapy at next level of care  -SA           Swallow Goals (SLP)    Oral Nutrition/Hydration Goal Selection (SLP)  oral nutrition/hydration, SLP goal 1  -SA           Oral Nutrition/Hydration Goal 1 (SLP)    Oral Nutrition/Hydration Goal 1, SLP  Pt will tolerate least restrictive diet with no overt s/s aspiration.   -SA        Time Frame (Oral Nutrition/Hydration Goal 1, SLP)  by discharge  -SA        Barriers (Oral Nutrition/Hydration Goal 1, SLP)  appears tolerating diet; needs 1:1 feed assist d/t hand contractures  -SA        Progress/Outcomes (Oral Nutrition/Hydration Goal 1, SLP)  good progress toward goal;goal ongoing  -SA          User Key  (r) = Recorded By, (t) = Taken By, (c) = Cosigned By    Initials Name Effective Dates    Wendy Martinez, MS CCC-SLP 04/03/18 -           EDUCATION  The patient has been educated in the following areas:   Dysphagia (Swallowing Impairment) Oral Care/Hydration Modified Diet Instruction.    SLP Recommendation and Plan  SLP Swallowing Diagnosis: suspected pharyngeal dysfunction  SLP Diet Recommendation: soft textures, ground, nectar thick liquids, water between meals after oral care, with supervision  Recommended Precautions and Strategies: small bites of food and sips of liquid, upright posture during/after eating, no straw, alternate between small bites of food and sips of liquid     Monitor for Signs of Aspiration: yes, notify SLP if any  concerns  Recommended Diagnostics: reassess via clinical swallow evaluation  Swallow Criteria for Skilled Therapeutic Interventions Met: demonstrates skilled criteria  Anticipated Dischage Disposition: anticipate therapy at next level of care  Rehab Potential/Prognosis, Swallowing: good, to achieve stated therapy goals  Therapy Frequency (Swallow): PRN  Predicted Duration Therapy Intervention (Days): until discharge       Plan of Care Reviewed With: patient  Plan of Care Review  Plan of Care Reviewed With: patient  Progress: no change  Outcome Summary: Reevaluation of bedside swallow to attempt advancing diet. Pt on soft; ground diet with nectar liquids. At [facility] facitlity pt was on a [facility] soft;/thin liquids. Pt with immediate cough post swallow with thin; suspect penetration or possible aspiration; continuous swallows , (despite verbal cues for single sips) via straw with nectar and cough post swallow. Suspect reduced oral control, mistiming. Pt tolerated solids with good mastication; khalil with nectar liquid wash. Unsure if this was d/t mistiming or from previous coughing.  Do not feel pt safe to be advanced to thins at present.      SLP GOALS     Row Name 01/02/19 1100             Oral Nutrition/Hydration Goal 1 (SLP)    Oral Nutrition/Hydration Goal 1, SLP  Pt will tolerate least restrictive diet with no overt s/s aspiration.   -SA      Time Frame (Oral Nutrition/Hydration Goal 1, SLP)  by discharge  -SA      Barriers (Oral Nutrition/Hydration Goal 1, SLP)  appears tolerating diet; needs 1:1 feed assist d/t hand contractures  -SA      Progress/Outcomes (Oral Nutrition/Hydration Goal 1, SLP)  good progress toward goal;goal ongoing  -SA        User Key  (r) = Recorded By, (t) = Taken By, (c) = Cosigned By    Initials Name Provider Type    Wendy Martinez MS CCC-SLP Speech and Language Pathologist           SLP Outcome Measures (last 72 hours)      SLP Outcome Measures     Row Name 01/02/19 1100              Adult FCM Scores    FCM Chosen  Swallowing  -      Swallowing FCM Score  4  -        User Key  (r) = Recorded By, (t) = Taken By, (c) = Cosigned By    Initials Name Effective Dates    Wendy Martinez MS CCC-SLP 18 -            Time Calculation:   Time Calculation- SLP     Row Name 19 1200             Time Calculation- SLP    SLP Start Time  1000  -      SLP Stop Time  1045  -      SLP Time Calculation (min)  45 min  -      SLP Received On  19  -        User Key  (r) = Recorded By, (t) = Taken By, (c) = Cosigned By    Initials Name Provider Type    Wendy Martinez MS CCC-SLP Speech and Language Pathologist          Therapy Charges for Today     Code Description Service Date Service Provider Modifiers Qty    59688590417 HC ST TREATMENT SWALLOW 3 2019 Wendy Mueller MS CCC-SLP GN 1               MS OTILIA Carmona  2019 and Acute Care - Speech Language Pathology   Swallow Treatment Note [facility]     Patient Name: Carolina Bhat  : 1960  MRN: [id number]  Today's Date: 2019               Admit Date: 2018    Visit Dx:      ICD-10-CM ICD-9-CM   1. Altered mental status, unspecified altered mental status type R41.82 780.97   2. ESRD on hemodialysis (CMS/[facility]) N18.6 585.6    Z99.2 V45.11     Patient Active Problem List   Diagnosis   • DM (diabetes mellitus) type II controlled with renal manifestation (CMS/[facility])   • Anemia of chronic renal failure, stage 3 (moderate) (CMS/[facility])   • Diabetic polyneuropathy associated with type 2 diabetes mellitus (CMS/[facility])   • ESRD on hemodialysis (CMS/[facility])   • HLD (hyperlipidemia)   • Altered mental status   • Immobility   • Morbid obesity (CMS/[facility])   • Sacral ulcer (CMS/[facility])   • Infected decubitus ulcer       Therapy Treatment      Outcome Summary  Outcome Summary/Treatment Plan (SLP)  Anticipated Dischage Disposition: anticipate therapy at next level of care (19 1100 : Wendy Mueller MS CCC-SLP)      SLP GOALS     Row  Name 01/02/19 1100             Oral Nutrition/Hydration Goal 1 (SLP)    Oral Nutrition/Hydration Goal 1, SLP  Pt will tolerate least restrictive diet with no overt s/s aspiration.   -SA      Time Frame (Oral Nutrition/Hydration Goal 1, SLP)  by discharge  -SA      Barriers (Oral Nutrition/Hydration Goal 1, SLP)  appears tolerating diet; needs 1:1 feed assist d/t hand contractures  -SA      Progress/Outcomes (Oral Nutrition/Hydration Goal 1, SLP)  good progress toward goal;goal ongoing  -SA        User Key  (r) = Recorded By, (t) = Taken By, (c) = Cosigned By    Initials Name Provider Type    Wendy Martinez MS CCC-SLP Speech and Language Pathologist          EDUCATION  The patient has been educated in the following areas:   Dysphagia (Swallowing Impairment).    SLP Recommendation and Plan  SLP Swallowing Diagnosis: suspected pharyngeal dysfunction  SLP Diet Recommendation: soft textures, ground, nectar thick liquids, water between meals after oral care, with supervision  Recommended Precautions and Strategies: small bites of food and sips of liquid, upright posture during/after eating, no straw, alternate between small bites of food and sips of liquid     Monitor for Signs of Aspiration: yes, notify SLP if any concerns  Recommended Diagnostics: reassess via clinical swallow evaluation  Swallow Criteria for Skilled Therapeutic Interventions Met: demonstrates skilled criteria  Anticipated Dischage Disposition: anticipate therapy at next level of care  Rehab Potential/Prognosis, Swallowing: good, to achieve stated therapy goals  Therapy Frequency (Swallow): PRN  Predicted Duration Therapy Intervention (Days): until discharge       Plan of Care Reviewed With: patient  Plan of Care Review  Plan of Care Reviewed With: patient  Progress: no change  Outcome Summary: Reevaluation of bedside swallow to attempt advancing diet. Pt on soft; ground diet with nectar liquids. At [facility] facitlity pt was on a [facility] soft;/thin  liquids. Pt with immediate cough post swallow with thin; suspect penetration or possible aspiration; continuous swallows , (despite verbal cues for single sips) via straw with nectar and cough post swallow. Suspect reduced oral control, mistiming. Pt tolerated solids with good mastication; khalil with nectar liquid wash. Unsure if this was d/t mistiming or from previous coughing.  Do not feel pt safe to be advanced to thins at present.         SLP Outcome Measures (last 72 hours)      SLP Outcome Measures     Row Name 01/02/19 1100             Adult FCM Scores    FCM Chosen  Swallowing  -      Swallowing FCM Score  4  -        User Key  (r) = Recorded By, (t) = Taken By, (c) = Cosigned By    Initials Name Effective Dates    Wendy Martinez MS CCC-SLP 04/03/18 -              Time Calculation:   Time Calculation- SLP     Row Name 01/02/19 1200             Time Calculation- SLP    SLP Start Time  1000  -      SLP Stop Time  1045  -      SLP Time Calculation (min)  45 min  -      SLP Received On  01/02/19  -        User Key  (r) = Recorded By, (t) = Taken By, (c) = Cosigned By    Initials Name Provider Type    Wendy Martinez MS CCC-SLP Speech and Language Pathologist          Therapy Charges for Today     Code Description Service Date Service Provider Modifiers Qty    [id number]  ST TREATMENT SWALLOW 3 1/2/2019 Wendy Mueller MS CCC-SLP GN 1                 Wendy Mueller MS CCC-STANLEY  1/2/2019

## 2019-01-02 NOTE — PLAN OF CARE
Problem: Patient Care Overview  Goal: Plan of Care Review  Outcome: Ongoing (interventions implemented as appropriate)   01/02/19 0647   Coping/Psychosocial   Plan of Care Reviewed With patient   Plan of Care Review   Progress improving   OTHER   Outcome Summary pt is alert, oriented to self and place, complaining of buttock pain, medicated PRN, turn q2h and PRN, no falls, bed alarm on, HD today then possible D/C, continue to monitor     Goal: Individualization and Mutuality  Outcome: Ongoing (interventions implemented as appropriate)    Goal: Discharge Needs Assessment  Outcome: Ongoing (interventions implemented as appropriate)      Problem: Wound (Includes Pressure Injury) (Adult)  Goal: Signs and Symptoms of Listed Potential Problems Will be Absent, Minimized or Managed (Wound)  Outcome: Ongoing (interventions implemented as appropriate)      Problem: Fall Risk (Adult)  Goal: Absence of Fall  Outcome: Ongoing (interventions implemented as appropriate)

## 2019-01-02 NOTE — PLAN OF CARE
Problem: Patient Care Overview  Goal: Plan of Care Review  Outcome: Ongoing (interventions implemented as appropriate)   01/02/19 0936   Coping/Psychosocial   Plan of Care Reviewed With patient   Plan of Care Review   Progress no change   OTHER   Outcome Summary a&o, c/o generalized pain, hollering out, vss. meds administered. HD sched for this afternoon, poss d/c after. orders noted. will continue to monitor.       Problem: Wound (Includes Pressure Injury) (Adult)  Goal: Signs and Symptoms of Listed Potential Problems Will be Absent, Minimized or Managed (Wound)  Outcome: Ongoing (interventions implemented as appropriate)   01/02/19 0936   Goal/Outcome Evaluation   Problems Assessed (Wound) all   Problems Present (Wound) delayed wound healing;infection;pain;situational response       Problem: Fall Risk (Adult)  Goal: Identify Related Risk Factors and Signs and Symptoms  Outcome: Outcome(s) achieved Date Met: 01/02/19 01/02/19 0936   Fall Risk (Adult)   Related Risk Factors (Fall Risk) age-related changes;bladder function altered;confusion/agitation;culprit medication(s);depression/anxiety;fatigue/slow reaction;fear of falling;gait/mobility problems;history of falls;homeostatic imbalance;impaired vision;inadequate lighting;neuro disease/injury;objects hard to reach;polypharmacy;sensory deficits;sleep pattern alteration;slippery/uneven surfaces;environment unfamiliar   Signs and Symptoms (Fall Risk) presence of risk factors     Goal: Absence of Fall  Outcome: Ongoing (interventions implemented as appropriate)   01/02/19 0936   Fall Risk (Adult)   Absence of Fall making progress toward outcome       Problem: Stroke (Ischemic) (Adult)  Goal: Signs and Symptoms of Listed Potential Problems Will be Absent, Minimized or Managed (Stroke)  Outcome: Ongoing (interventions implemented as appropriate)   01/02/19 0936   Goal/Outcome Evaluation   Problems Assessed (Stroke (Ischemic)) all   Problems Assessed (Stroke (Ischemic))  acute neurologic deterioration;bladder/bowel dysfunction;cognitive impairment;communication impairment;eating/swallowing impairment;motor/sensory impairment;muscle tone abnormal;situational response;skin breakdown

## 2019-01-02 NOTE — PROGRESS NOTES
Continued Stay Note  [facility]     Patient Name: Carolina Bhat  MRN: [id number]  Today's Date: 1/2/2019    Admit Date: 12/26/2018    Discharge Plan     Row Name 01/02/19 1217       Plan    Plan  Return back to [facility] via ambulance at 9pm after HD    Patient/Family in Agreement with Plan  yes    Plan Comments  Pt to be discharged after HD session this afternoon. Spoke with daughter Ida via outbound call, IMM Notice given and explained ambulance is arranged for 9pm. Spoke with pt at bedside as well and she verbalized understanding and ambulance disclaimer. Packet given to JANET Lindsey. CCP notified [place]. Mindi GONZALES/CCP        Discharge Codes    No documentation.             Britany Monteiro RN

## 2019-01-02 NOTE — PROGRESS NOTES
Nephrology Progress Note  Patient: Carolina Bhat  Date of Service: 2019  7:10 AM  : 1960  MRN: [id number]  ATTENDING: Max Mendoza MD  PRIMARY: Merrill Odonnell MD  _________________________________________    Follow up for: ESRD    HPI and Review of Systems:  The patient is feeling the same, denies any chest pain or shortness of air, no nausea or vomiting, no abdominal pain.  She had good night sleep.      .        Scheduled Meds:    aspirin 325 mg Oral Daily   Or      aspirin 300 mg Rectal Daily   atorvastatin 80 mg Oral Nightly   carvedilol 12.5 mg Oral BID With Meals   docusate sodium 250 mg Oral Daily   insulin glargine 18 Units Subcutaneous QAM   insulin lispro 0-7 Units Subcutaneous 4x Daily With Meals & Nightly   lactulose 20 g Oral BID   piperacillin-tazobactam 3.375 g Intravenous Q12H   polyethylene glycol 17 g Oral Daily   sevelamer 800 mg Oral TID With Meals   sodium chloride 3 mL Intravenous Q12H   terazosin 5 mg Oral Nightly     Continuous Infusions:    Pharmacy to Dose Zosyn      PRN Meds: •  acetaminophen  •  dextrose  •  dextrose  •  glucagon (human recombinant)  •  HYDROcodone-acetaminophen  •  ondansetron  •  ondansetron  •  Pharmacy to Dose Zosyn  •  [COMPLETED] Insert peripheral IV **AND** sodium chloride  •  sodium chloride    Objective  Temp:  [97.9 °F (36.6 °C)-98.5 °F (36.9 °C)] 97.9 °F (36.6 °C)  Heart Rate:  [84-87] 86  Resp:  [18] 18  BP: ()/(50-77) 123/77  General Appearance: alert, oriented x 3, no acute distress, obese, chronically ill  Skin: warm and dry  HEENT: Nonicteric sclerae, oral mucosa is normal,   Neck: supple, no JVD, trachea midline  Lungs: Bilateral rhonchi, unlabored breathing effort  Heart: RRR, normal S1 and S2, no S3, no rub  Abdomen: soft, non-tender,  present bowel sounds to auscultation  : no palpable bladder,  Extremities: no edema, cyanosis or clubbing, functional AV fistula in the right upper arm  Neuro: normal speech and mental  status, she had the left hemiparesis with contracted upper extremity related to her previous        LAB:  Results from last 7 days   Lab Units  01/02/19 0441 01/01/19 0619 12/31/18 0439 12/26/18   1517   SODIUM mmol/L  139  139  141   < >  138   POTASSIUM mmol/L  4.6  4.3  3.8   < >  4.1   CHLORIDE mmol/L  89*  92*  93*   < >  92*   CO2 mmol/L  26.9  29.3*  26.9   < >  30.5*   BUN mg/dL  36*  29*  17   < >  38*   CREATININE mg/dL  7.04*  5.63*  4.08*   < >  4.47*   GLUCOSE mg/dL  86  174*  202*   < >  179*   CALCIUM mg/dL  8.3*  8.4*  9.6   < >  8.8   PHOSPHORUS mg/dL  8.1*  7.8*   --    --   3.6   ALBUMIN g/dL  3.40*  3.50   --    --    --     < > = values in this interval not displayed.       Results from last 7 days   Lab Units  01/02/19 0441 01/01/19 0619 12/31/18   0439   WBC 10*3/mm3  8.91  8.65  8.32   HEMOGLOBIN g/dL  12.5  12.6  13.9   PLATELETS 10*3/mm3  221  236  242       Assessment:  1.  End-stage renal disease, she had dialysis on Sunday, she normally dialyzes every Monday, Wednesday and Friday..  2.  Altered mental status has resolved  3.  Anemia of chronic kidney disease  4.  Immobility associated with old stroke  5.  Diabetes mellitus type 2  6.  Hypertension, controlled    Plan:  1. Next dialysis is today  2.  Continue the same treatment  3.  Surveillance labs    _______________________________  Liam Rangel MD  [facility]  [phone]

## 2019-01-04 NOTE — PROGRESS NOTES
Case Management Discharge Note    Final Note: [facility] via [facility], message left for Conchita/Taisha Obregon RN/CCP    Destination - Selection Complete      Service Provider Request Status Selected Services Address Phone Number Fax Number    [facility] [address] [facility] [id number] [phone]       Oseas Morel RN 12/27/2018 1032    Called to Conchita                 Durable Medical Equipment      No service has been selected for the patient.      Dialysis/Infusion      No service has been selected for the patient.      Home Medical Care      No service has been selected for the patient.      Community Resources      No service has been selected for the patient.        Ambulance: [facility]    Final Discharge Disposition Code: 04 - intermediate care facility

## 2019-01-10 RX ORDER — HYDROCODONE BITARTRATE AND ACETAMINOPHEN 7.5; 325 MG/1; MG/1
1 TABLET ORAL EVERY 6 HOURS PRN
Qty: 240 TABLET | Refills: 0 | Status: ON HOLD | OUTPATIENT
Start: 2019-01-10 | End: 2019-01-26 | Stop reason: SDUPTHER

## 2019-01-16 ENCOUNTER — OUTSIDE FACILITY SERVICE (OUTPATIENT)
Dept: INTERNAL MEDICINE | Facility: CLINIC | Age: 59
End: 2019-01-16

## 2019-01-16 PROCEDURE — 99308 SBSQ NF CARE LOW MDM 20: CPT | Performed by: FAMILY MEDICINE

## 2019-01-21 ENCOUNTER — APPOINTMENT (OUTPATIENT)
Dept: CT IMAGING | Facility: HOSPITAL | Age: 59
End: 2019-01-21

## 2019-01-21 ENCOUNTER — APPOINTMENT (OUTPATIENT)
Dept: GENERAL RADIOLOGY | Facility: HOSPITAL | Age: 59
End: 2019-01-21

## 2019-01-21 ENCOUNTER — HOSPITAL ENCOUNTER (INPATIENT)
Facility: HOSPITAL | Age: 59
LOS: 4 days | Discharge: NURSING, REHAB OR LONG-TERM CARE | End: 2019-01-26
Attending: HOSPITALIST | Admitting: HOSPITALIST

## 2019-01-21 DIAGNOSIS — R56.9 NEW ONSET SEIZURE (HCC): Primary | ICD-10-CM

## 2019-01-21 DIAGNOSIS — N18.6 CHRONIC KIDNEY DISEASE ON CHRONIC DIALYSIS (HCC): ICD-10-CM

## 2019-01-21 DIAGNOSIS — Z99.2 CHRONIC KIDNEY DISEASE ON CHRONIC DIALYSIS (HCC): ICD-10-CM

## 2019-01-21 DIAGNOSIS — R41.82 ALTERED MENTAL STATUS, UNSPECIFIED ALTERED MENTAL STATUS TYPE: ICD-10-CM

## 2019-01-21 DIAGNOSIS — Z86.39 HISTORY OF DIABETES MELLITUS: ICD-10-CM

## 2019-01-21 DIAGNOSIS — Z86.73 HISTORY OF CVA (CEREBROVASCULAR ACCIDENT): ICD-10-CM

## 2019-01-21 PROBLEM — R41.0 CONFUSION: Status: ACTIVE | Noted: 2019-01-21

## 2019-01-21 LAB
ALBUMIN SERPL-MCNC: 4.5 G/DL (ref 3.5–5.2)
ALBUMIN/GLOB SERPL: 0.8 G/DL
ALP SERPL-CCNC: 161 U/L (ref 39–117)
ALT SERPL W P-5'-P-CCNC: 19 U/L (ref 1–33)
AMMONIA BLD-SCNC: 31 UMOL/L (ref 11–51)
ANION GAP SERPL CALCULATED.3IONS-SCNC: 22.3 MMOL/L
APTT PPP: 118.6 SECONDS (ref 22.7–35.4)
AST SERPL-CCNC: 18 U/L (ref 1–32)
B PARAPERT DNA SPEC QL NAA+PROBE: NOT DETECTED
B PERT DNA SPEC QL NAA+PROBE: NOT DETECTED
BASOPHILS # BLD AUTO: 0.03 10*3/MM3 (ref 0–0.2)
BASOPHILS NFR BLD AUTO: 0.3 % (ref 0–1.5)
BILIRUB SERPL-MCNC: 0.3 MG/DL (ref 0.1–1.2)
BUN BLD-MCNC: 14 MG/DL (ref 6–20)
BUN/CREAT SERPL: 4.5 (ref 7–25)
C PNEUM DNA NPH QL NAA+NON-PROBE: NOT DETECTED
CALCIUM SPEC-SCNC: 10.5 MG/DL (ref 8.6–10.5)
CHLORIDE SERPL-SCNC: 89 MMOL/L (ref 98–107)
CK SERPL-CCNC: 42 U/L (ref 20–180)
CO2 SERPL-SCNC: 28.7 MMOL/L (ref 22–29)
CREAT BLD-MCNC: 3.09 MG/DL (ref 0.57–1)
D-LACTATE SERPL-SCNC: 1.7 MMOL/L (ref 0.5–2)
D-LACTATE SERPL-SCNC: 7.8 MMOL/L (ref 0.5–2)
DEPRECATED RDW RBC AUTO: 58.5 FL (ref 37–54)
EOSINOPHIL # BLD AUTO: 0.04 10*3/MM3 (ref 0–0.7)
EOSINOPHIL NFR BLD AUTO: 0.3 % (ref 0.3–6.2)
ERYTHROCYTE [DISTWIDTH] IN BLOOD BY AUTOMATED COUNT: 17.2 % (ref 11.7–13)
FLUAV H1 2009 PAND RNA NPH QL NAA+PROBE: NOT DETECTED
FLUAV H1 HA GENE NPH QL NAA+PROBE: NOT DETECTED
FLUAV H3 RNA NPH QL NAA+PROBE: NOT DETECTED
FLUAV SUBTYP SPEC NAA+PROBE: NOT DETECTED
FLUBV RNA ISLT QL NAA+PROBE: NOT DETECTED
GFR SERPL CREATININE-BSD FRML MDRD: 19 ML/MIN/1.73
GLOBULIN UR ELPH-MCNC: 5.3 GM/DL
GLUCOSE BLD-MCNC: 242 MG/DL (ref 65–99)
GLUCOSE BLDC GLUCOMTR-MCNC: 170 MG/DL (ref 70–130)
GLUCOSE BLDC GLUCOMTR-MCNC: 181 MG/DL (ref 70–130)
GLUCOSE BLDC GLUCOMTR-MCNC: 223 MG/DL (ref 70–130)
HADV DNA SPEC NAA+PROBE: NOT DETECTED
HCOV 229E RNA SPEC QL NAA+PROBE: NOT DETECTED
HCOV HKU1 RNA SPEC QL NAA+PROBE: NOT DETECTED
HCOV NL63 RNA SPEC QL NAA+PROBE: NOT DETECTED
HCOV OC43 RNA SPEC QL NAA+PROBE: NOT DETECTED
HCT VFR BLD AUTO: 44.3 % (ref 35.6–45.5)
HGB BLD-MCNC: 14.3 G/DL (ref 11.9–15.5)
HMPV RNA NPH QL NAA+NON-PROBE: NOT DETECTED
HOLD SPECIMEN: NORMAL
HOLD SPECIMEN: NORMAL
HPIV1 RNA SPEC QL NAA+PROBE: NOT DETECTED
HPIV2 RNA SPEC QL NAA+PROBE: NOT DETECTED
HPIV3 RNA NPH QL NAA+PROBE: NOT DETECTED
HPIV4 P GENE NPH QL NAA+PROBE: NOT DETECTED
IMM GRANULOCYTES # BLD AUTO: 0.03 10*3/MM3 (ref 0–0.03)
IMM GRANULOCYTES NFR BLD AUTO: 0.3 % (ref 0–0.5)
LYMPHOCYTES # BLD AUTO: 3.43 10*3/MM3 (ref 0.9–4.8)
LYMPHOCYTES NFR BLD AUTO: 29.5 % (ref 19.6–45.3)
M PNEUMO IGG SER IA-ACNC: NOT DETECTED
MAGNESIUM SERPL-MCNC: 2.4 MG/DL (ref 1.6–2.6)
MCH RBC QN AUTO: 29.8 PG (ref 26.9–32)
MCHC RBC AUTO-ENTMCNC: 32.3 G/DL (ref 32.4–36.3)
MCV RBC AUTO: 92.3 FL (ref 80.5–98.2)
MONOCYTES # BLD AUTO: 0.53 10*3/MM3 (ref 0.2–1.2)
MONOCYTES NFR BLD AUTO: 4.6 % (ref 5–12)
NEUTROPHILS # BLD AUTO: 7.58 10*3/MM3 (ref 1.9–8.1)
NEUTROPHILS NFR BLD AUTO: 65.3 % (ref 42.7–76)
PLATELET # BLD AUTO: 262 10*3/MM3 (ref 140–500)
PMV BLD AUTO: 10.7 FL (ref 6–12)
POTASSIUM BLD-SCNC: 4.4 MMOL/L (ref 3.5–5.2)
PROCALCITONIN SERPL-MCNC: 0.5 NG/ML (ref 0.1–0.25)
PROT SERPL-MCNC: 9.8 G/DL (ref 6–8.5)
RBC # BLD AUTO: 4.8 10*6/MM3 (ref 3.9–5.2)
RHINOVIRUS RNA SPEC NAA+PROBE: NOT DETECTED
RSV RNA NPH QL NAA+NON-PROBE: NOT DETECTED
SODIUM BLD-SCNC: 140 MMOL/L (ref 136–145)
TROPONIN T SERPL-MCNC: 0.05 NG/ML (ref 0–0.03)
WBC NRBC COR # BLD: 11.61 10*3/MM3 (ref 4.5–10.7)

## 2019-01-21 PROCEDURE — 82962 GLUCOSE BLOOD TEST: CPT

## 2019-01-21 PROCEDURE — 80053 COMPREHEN METABOLIC PANEL: CPT | Performed by: EMERGENCY MEDICINE

## 2019-01-21 PROCEDURE — 87076 CULTURE ANAEROBE IDENT EACH: CPT | Performed by: EMERGENCY MEDICINE

## 2019-01-21 PROCEDURE — G0378 HOSPITAL OBSERVATION PER HR: HCPCS

## 2019-01-21 PROCEDURE — 84484 ASSAY OF TROPONIN QUANT: CPT | Performed by: EMERGENCY MEDICINE

## 2019-01-21 PROCEDURE — 87486 CHLMYD PNEUM DNA AMP PROBE: CPT | Performed by: EMERGENCY MEDICINE

## 2019-01-21 PROCEDURE — 87798 DETECT AGENT NOS DNA AMP: CPT | Performed by: EMERGENCY MEDICINE

## 2019-01-21 PROCEDURE — 85730 THROMBOPLASTIN TIME PARTIAL: CPT | Performed by: EMERGENCY MEDICINE

## 2019-01-21 PROCEDURE — 82140 ASSAY OF AMMONIA: CPT | Performed by: HOSPITALIST

## 2019-01-21 PROCEDURE — 99222 1ST HOSP IP/OBS MODERATE 55: CPT | Performed by: PSYCHIATRY & NEUROLOGY

## 2019-01-21 PROCEDURE — 99285 EMERGENCY DEPT VISIT HI MDM: CPT

## 2019-01-21 PROCEDURE — 25010000002 LEVETIRACETAM IN NACL 0.82% 500 MG/100ML SOLUTION: Performed by: EMERGENCY MEDICINE

## 2019-01-21 PROCEDURE — 93010 ELECTROCARDIOGRAM REPORT: CPT | Performed by: INTERNAL MEDICINE

## 2019-01-21 PROCEDURE — 71045 X-RAY EXAM CHEST 1 VIEW: CPT

## 2019-01-21 PROCEDURE — 83735 ASSAY OF MAGNESIUM: CPT | Performed by: EMERGENCY MEDICINE

## 2019-01-21 PROCEDURE — 85025 COMPLETE CBC W/AUTO DIFF WBC: CPT | Performed by: EMERGENCY MEDICINE

## 2019-01-21 PROCEDURE — 93005 ELECTROCARDIOGRAM TRACING: CPT | Performed by: EMERGENCY MEDICINE

## 2019-01-21 PROCEDURE — 87040 BLOOD CULTURE FOR BACTERIA: CPT | Performed by: EMERGENCY MEDICINE

## 2019-01-21 PROCEDURE — 84145 PROCALCITONIN (PCT): CPT | Performed by: EMERGENCY MEDICINE

## 2019-01-21 PROCEDURE — 87581 M.PNEUMON DNA AMP PROBE: CPT | Performed by: EMERGENCY MEDICINE

## 2019-01-21 PROCEDURE — 87633 RESP VIRUS 12-25 TARGETS: CPT | Performed by: EMERGENCY MEDICINE

## 2019-01-21 PROCEDURE — 63710000001 INSULIN LISPRO (HUMAN) PER 5 UNITS: Performed by: HOSPITALIST

## 2019-01-21 PROCEDURE — 70450 CT HEAD/BRAIN W/O DYE: CPT

## 2019-01-21 PROCEDURE — 83605 ASSAY OF LACTIC ACID: CPT | Performed by: EMERGENCY MEDICINE

## 2019-01-21 PROCEDURE — 82550 ASSAY OF CK (CPK): CPT | Performed by: HOSPITALIST

## 2019-01-21 RX ORDER — TERAZOSIN 5 MG/1
5 CAPSULE ORAL NIGHTLY
Status: DISCONTINUED | OUTPATIENT
Start: 2019-01-22 | End: 2019-01-22

## 2019-01-21 RX ORDER — MIRTAZAPINE 15 MG/1
15 TABLET, FILM COATED ORAL NIGHTLY
Status: DISCONTINUED | OUTPATIENT
Start: 2019-01-21 | End: 2019-01-22

## 2019-01-21 RX ORDER — LACTULOSE 10 G/15ML
20 SOLUTION ORAL 2 TIMES DAILY
Status: DISCONTINUED | OUTPATIENT
Start: 2019-01-21 | End: 2019-01-27 | Stop reason: HOSPADM

## 2019-01-21 RX ORDER — ONDANSETRON 4 MG/1
4 TABLET, ORALLY DISINTEGRATING ORAL EVERY 6 HOURS PRN
Status: DISCONTINUED | OUTPATIENT
Start: 2019-01-21 | End: 2019-01-27 | Stop reason: HOSPADM

## 2019-01-21 RX ORDER — LEVETIRACETAM 5 MG/ML
500 INJECTION INTRAVASCULAR ONCE
Status: COMPLETED | OUTPATIENT
Start: 2019-01-21 | End: 2019-01-21

## 2019-01-21 RX ORDER — SODIUM CHLORIDE 0.9 % (FLUSH) 0.9 %
3-10 SYRINGE (ML) INJECTION AS NEEDED
Status: DISCONTINUED | OUTPATIENT
Start: 2019-01-21 | End: 2019-01-27 | Stop reason: HOSPADM

## 2019-01-21 RX ORDER — INSULIN GLARGINE 100 [IU]/ML
18 INJECTION, SOLUTION SUBCUTANEOUS EVERY MORNING
Status: DISCONTINUED | OUTPATIENT
Start: 2019-01-22 | End: 2019-01-24

## 2019-01-21 RX ORDER — DEXTROSE MONOHYDRATE 25 G/50ML
25 INJECTION, SOLUTION INTRAVENOUS
Status: DISCONTINUED | OUTPATIENT
Start: 2019-01-21 | End: 2019-01-27 | Stop reason: HOSPADM

## 2019-01-21 RX ORDER — ONDANSETRON 2 MG/ML
4 INJECTION INTRAMUSCULAR; INTRAVENOUS EVERY 6 HOURS PRN
Status: DISCONTINUED | OUTPATIENT
Start: 2019-01-21 | End: 2019-01-27 | Stop reason: HOSPADM

## 2019-01-21 RX ORDER — BUPROPION HYDROCHLORIDE 100 MG/1
100 TABLET, EXTENDED RELEASE ORAL DAILY
Status: DISCONTINUED | OUTPATIENT
Start: 2019-01-22 | End: 2019-01-22

## 2019-01-21 RX ORDER — SEVELAMER CARBONATE 800 MG/1
800 TABLET, FILM COATED ORAL
Status: DISCONTINUED | OUTPATIENT
Start: 2019-01-21 | End: 2019-01-22

## 2019-01-21 RX ORDER — BISACODYL 10 MG
10 SUPPOSITORY, RECTAL RECTAL DAILY PRN
Status: DISCONTINUED | OUTPATIENT
Start: 2019-01-21 | End: 2019-01-27 | Stop reason: HOSPADM

## 2019-01-21 RX ORDER — MULTIPLE VITAMINS W/ MINERALS TAB 9MG-400MCG
1 TAB ORAL DAILY
Status: DISCONTINUED | OUTPATIENT
Start: 2019-01-22 | End: 2019-01-22

## 2019-01-21 RX ORDER — CARVEDILOL 12.5 MG/1
12.5 TABLET ORAL 2 TIMES DAILY WITH MEALS
Status: DISCONTINUED | OUTPATIENT
Start: 2019-01-21 | End: 2019-01-22

## 2019-01-21 RX ORDER — HYDROCODONE BITARTRATE AND ACETAMINOPHEN 7.5; 325 MG/1; MG/1
1 TABLET ORAL EVERY 6 HOURS PRN
Status: DISCONTINUED | OUTPATIENT
Start: 2019-01-21 | End: 2019-01-27 | Stop reason: HOSPADM

## 2019-01-21 RX ORDER — ACETAMINOPHEN 325 MG/1
650 TABLET ORAL EVERY 4 HOURS PRN
Status: DISCONTINUED | OUTPATIENT
Start: 2019-01-21 | End: 2019-01-27 | Stop reason: HOSPADM

## 2019-01-21 RX ORDER — ONDANSETRON 4 MG/1
4 TABLET, FILM COATED ORAL EVERY 6 HOURS PRN
Status: DISCONTINUED | OUTPATIENT
Start: 2019-01-21 | End: 2019-01-27 | Stop reason: HOSPADM

## 2019-01-21 RX ORDER — NICOTINE POLACRILEX 4 MG
15 LOZENGE BUCCAL
Status: DISCONTINUED | OUTPATIENT
Start: 2019-01-21 | End: 2019-01-27 | Stop reason: HOSPADM

## 2019-01-21 RX ORDER — SODIUM CHLORIDE 0.9 % (FLUSH) 0.9 %
3 SYRINGE (ML) INJECTION EVERY 12 HOURS SCHEDULED
Status: DISCONTINUED | OUTPATIENT
Start: 2019-01-21 | End: 2019-01-22

## 2019-01-21 RX ORDER — FERROUS SULFATE 325(65) MG
325 TABLET ORAL
Status: DISCONTINUED | OUTPATIENT
Start: 2019-01-22 | End: 2019-01-22

## 2019-01-21 RX ORDER — ATORVASTATIN CALCIUM 20 MG/1
60 TABLET, FILM COATED ORAL DAILY
Status: DISCONTINUED | OUTPATIENT
Start: 2019-01-21 | End: 2019-01-22

## 2019-01-21 RX ORDER — SIMETHICONE 80 MG
80 TABLET,CHEWABLE ORAL 3 TIMES DAILY
Status: DISCONTINUED | OUTPATIENT
Start: 2019-01-21 | End: 2019-01-27 | Stop reason: HOSPADM

## 2019-01-21 RX ORDER — POLYETHYLENE GLYCOL 3350 17 G/17G
17 POWDER, FOR SOLUTION ORAL DAILY
Status: DISCONTINUED | OUTPATIENT
Start: 2019-01-21 | End: 2019-01-22

## 2019-01-21 RX ADMIN — SEVELAMER CARBONATE 800 MG: 800 TABLET, FILM COATED ORAL at 18:42

## 2019-01-21 RX ADMIN — INSULIN LISPRO 2 UNITS: 100 INJECTION, SOLUTION INTRAVENOUS; SUBCUTANEOUS at 18:42

## 2019-01-21 RX ADMIN — LACTULOSE 20 G: 20 SOLUTION ORAL at 21:33

## 2019-01-21 RX ADMIN — Medication 3 ML: at 21:33

## 2019-01-21 RX ADMIN — MIRTAZAPINE 15 MG: 15 TABLET, FILM COATED ORAL at 21:33

## 2019-01-21 RX ADMIN — SIMETHICONE CHEW TAB 80 MG 80 MG: 80 TABLET ORAL at 21:33

## 2019-01-21 RX ADMIN — ATORVASTATIN CALCIUM 60 MG: 20 TABLET, FILM COATED ORAL at 18:42

## 2019-01-21 RX ADMIN — INSULIN LISPRO 2 UNITS: 100 INJECTION, SOLUTION INTRAVENOUS; SUBCUTANEOUS at 21:33

## 2019-01-21 RX ADMIN — LEVETIRACETAM 500 MG: 5 INJECTION INTRAVENOUS at 12:59

## 2019-01-21 RX ADMIN — CARVEDILOL 12.5 MG: 12.5 TABLET, FILM COATED ORAL at 18:42

## 2019-01-22 ENCOUNTER — APPOINTMENT (OUTPATIENT)
Dept: NEUROLOGY | Facility: HOSPITAL | Age: 59
End: 2019-01-22
Attending: HOSPITALIST

## 2019-01-22 LAB
ANION GAP SERPL CALCULATED.3IONS-SCNC: 17.5 MMOL/L
ARTERIAL PATENCY WRIST A: POSITIVE
ATMOSPHERIC PRESS: 756.7 MMHG
BASE EXCESS BLDA CALC-SCNC: 4.6 MMOL/L (ref 0–2)
BASOPHILS # BLD AUTO: 0.02 10*3/MM3 (ref 0–0.2)
BASOPHILS NFR BLD AUTO: 0.2 % (ref 0–1.5)
BDY SITE: ABNORMAL
BUN BLD-MCNC: 29 MG/DL (ref 6–20)
BUN/CREAT SERPL: 6.1 (ref 7–25)
CALCIUM SPEC-SCNC: 9.2 MG/DL (ref 8.6–10.5)
CHLORIDE SERPL-SCNC: 91 MMOL/L (ref 98–107)
CK SERPL-CCNC: 44 U/L (ref 20–180)
CO2 SERPL-SCNC: 27.5 MMOL/L (ref 22–29)
CREAT BLD-MCNC: 4.73 MG/DL (ref 0.57–1)
D-LACTATE SERPL-SCNC: 2.1 MMOL/L (ref 0.5–2)
D-LACTATE SERPL-SCNC: 3.4 MMOL/L (ref 0.5–2)
DEPRECATED RDW RBC AUTO: 62.2 FL (ref 37–54)
EOSINOPHIL # BLD AUTO: 0.08 10*3/MM3 (ref 0–0.7)
EOSINOPHIL NFR BLD AUTO: 0.7 % (ref 0.3–6.2)
ERYTHROCYTE [DISTWIDTH] IN BLOOD BY AUTOMATED COUNT: 17.6 % (ref 11.7–13)
GFR SERPL CREATININE-BSD FRML MDRD: 11 ML/MIN/1.73
GFR SERPL CREATININE-BSD FRML MDRD: ABNORMAL ML/MIN/1.73
GLUCOSE BLD-MCNC: 146 MG/DL (ref 65–99)
GLUCOSE BLDC GLUCOMTR-MCNC: 104 MG/DL (ref 70–130)
GLUCOSE BLDC GLUCOMTR-MCNC: 114 MG/DL (ref 70–130)
GLUCOSE BLDC GLUCOMTR-MCNC: 134 MG/DL (ref 70–130)
GLUCOSE BLDC GLUCOMTR-MCNC: 139 MG/DL (ref 70–130)
GLUCOSE BLDC GLUCOMTR-MCNC: 152 MG/DL (ref 70–130)
GLUCOSE BLDC GLUCOMTR-MCNC: 156 MG/DL (ref 70–130)
HBA1C MFR BLD: 7.51 % (ref 4.8–5.6)
HCO3 BLDA-SCNC: 29.1 MMOL/L (ref 22–28)
HCT VFR BLD AUTO: 42.4 % (ref 35.6–45.5)
HGB BLD-MCNC: 13.1 G/DL (ref 11.9–15.5)
IMM GRANULOCYTES # BLD AUTO: 0.02 10*3/MM3 (ref 0–0.03)
IMM GRANULOCYTES NFR BLD AUTO: 0.2 % (ref 0–0.5)
LYMPHOCYTES # BLD AUTO: 2.83 10*3/MM3 (ref 0.9–4.8)
LYMPHOCYTES NFR BLD AUTO: 25.6 % (ref 19.6–45.3)
MAGNESIUM SERPL-MCNC: 2.4 MG/DL (ref 1.6–2.6)
MCH RBC QN AUTO: 29.7 PG (ref 26.9–32)
MCHC RBC AUTO-ENTMCNC: 30.9 G/DL (ref 32.4–36.3)
MCV RBC AUTO: 96.1 FL (ref 80.5–98.2)
MODALITY: ABNORMAL
MONOCYTES # BLD AUTO: 0.74 10*3/MM3 (ref 0.2–1.2)
MONOCYTES NFR BLD AUTO: 6.7 % (ref 5–12)
NEUTROPHILS # BLD AUTO: 7.37 10*3/MM3 (ref 1.9–8.1)
NEUTROPHILS NFR BLD AUTO: 66.6 % (ref 42.7–76)
PCO2 BLDA: 42 MM HG (ref 35–45)
PH BLDA: 7.45 PH UNITS (ref 7.35–7.45)
PHOSPHATE SERPL-MCNC: 3.3 MG/DL (ref 2.5–4.5)
PLATELET # BLD AUTO: 239 10*3/MM3 (ref 140–500)
PMV BLD AUTO: 10.3 FL (ref 6–12)
PO2 BLDA: 76.1 MM HG (ref 80–100)
POTASSIUM BLD-SCNC: 3.6 MMOL/L (ref 3.5–5.2)
PROCALCITONIN SERPL-MCNC: 0.51 NG/ML (ref 0.1–0.25)
RBC # BLD AUTO: 4.41 10*6/MM3 (ref 3.9–5.2)
SAO2 % BLDCOA: 95.6 % (ref 92–99)
SET MECH RESP RATE: 18
SODIUM BLD-SCNC: 136 MMOL/L (ref 136–145)
TROPONIN T SERPL-MCNC: 0.04 NG/ML (ref 0–0.03)
TROPONIN T SERPL-MCNC: 0.05 NG/ML (ref 0–0.03)
TSH SERPL DL<=0.05 MIU/L-ACNC: 1.43 MIU/ML (ref 0.27–4.2)
WBC NRBC COR # BLD: 11.06 10*3/MM3 (ref 4.5–10.7)

## 2019-01-22 PROCEDURE — 82550 ASSAY OF CK (CPK): CPT | Performed by: HOSPITALIST

## 2019-01-22 PROCEDURE — 80048 BASIC METABOLIC PNL TOTAL CA: CPT | Performed by: HOSPITALIST

## 2019-01-22 PROCEDURE — 93005 ELECTROCARDIOGRAM TRACING: CPT | Performed by: HOSPITALIST

## 2019-01-22 PROCEDURE — 85025 COMPLETE CBC W/AUTO DIFF WBC: CPT | Performed by: HOSPITALIST

## 2019-01-22 PROCEDURE — 25010000002 VANCOMYCIN 10 G RECONSTITUTED SOLUTION: Performed by: INTERNAL MEDICINE

## 2019-01-22 PROCEDURE — 87150 DNA/RNA AMPLIFIED PROBE: CPT | Performed by: HOSPITALIST

## 2019-01-22 PROCEDURE — 82962 GLUCOSE BLOOD TEST: CPT

## 2019-01-22 PROCEDURE — 25010000002 CEFTRIAXONE PER 250 MG: Performed by: INTERNAL MEDICINE

## 2019-01-22 PROCEDURE — 83605 ASSAY OF LACTIC ACID: CPT | Performed by: INTERNAL MEDICINE

## 2019-01-22 PROCEDURE — 36600 WITHDRAWAL OF ARTERIAL BLOOD: CPT

## 2019-01-22 PROCEDURE — 92610 EVALUATE SWALLOWING FUNCTION: CPT | Performed by: SPEECH-LANGUAGE PATHOLOGIST

## 2019-01-22 PROCEDURE — 99223 1ST HOSP IP/OBS HIGH 75: CPT | Performed by: INTERNAL MEDICINE

## 2019-01-22 PROCEDURE — 83605 ASSAY OF LACTIC ACID: CPT | Performed by: HOSPITALIST

## 2019-01-22 PROCEDURE — 94799 UNLISTED PULMONARY SVC/PX: CPT

## 2019-01-22 PROCEDURE — 82803 BLOOD GASES ANY COMBINATION: CPT

## 2019-01-22 PROCEDURE — 87040 BLOOD CULTURE FOR BACTERIA: CPT | Performed by: HOSPITALIST

## 2019-01-22 PROCEDURE — 84145 PROCALCITONIN (PCT): CPT | Performed by: HOSPITALIST

## 2019-01-22 PROCEDURE — 95816 EEG AWAKE AND DROWSY: CPT

## 2019-01-22 PROCEDURE — 93010 ELECTROCARDIOGRAM REPORT: CPT | Performed by: INTERNAL MEDICINE

## 2019-01-22 PROCEDURE — 87147 CULTURE TYPE IMMUNOLOGIC: CPT | Performed by: HOSPITALIST

## 2019-01-22 PROCEDURE — 95816 EEG AWAKE AND DROWSY: CPT | Performed by: PSYCHIATRY & NEUROLOGY

## 2019-01-22 PROCEDURE — 83735 ASSAY OF MAGNESIUM: CPT | Performed by: HOSPITALIST

## 2019-01-22 PROCEDURE — 84484 ASSAY OF TROPONIN QUANT: CPT | Performed by: HOSPITALIST

## 2019-01-22 PROCEDURE — 83036 HEMOGLOBIN GLYCOSYLATED A1C: CPT | Performed by: HOSPITALIST

## 2019-01-22 PROCEDURE — 99233 SBSQ HOSP IP/OBS HIGH 50: CPT | Performed by: NURSE PRACTITIONER

## 2019-01-22 PROCEDURE — 84443 ASSAY THYROID STIM HORMONE: CPT | Performed by: HOSPITALIST

## 2019-01-22 PROCEDURE — 63710000001 INSULIN GLARGINE PER 5 UNITS: Performed by: HOSPITALIST

## 2019-01-22 PROCEDURE — 84100 ASSAY OF PHOSPHORUS: CPT | Performed by: HOSPITALIST

## 2019-01-22 PROCEDURE — 25010000002 AMPICILLIN PER 500 MG: Performed by: INTERNAL MEDICINE

## 2019-01-22 RX ORDER — LEVETIRACETAM 500 MG/1
500 TABLET ORAL 2 TIMES DAILY
Status: DISCONTINUED | OUTPATIENT
Start: 2019-01-22 | End: 2019-01-22

## 2019-01-22 RX ORDER — CEFTRIAXONE SODIUM 1 G/50ML
1 INJECTION, SOLUTION INTRAVENOUS EVERY 24 HOURS
Status: COMPLETED | OUTPATIENT
Start: 2019-01-22 | End: 2019-01-22

## 2019-01-22 RX ADMIN — AMPICILLIN SODIUM 2 G: 2 INJECTION, POWDER, FOR SOLUTION INTRAMUSCULAR; INTRAVENOUS at 15:49

## 2019-01-22 RX ADMIN — LACTULOSE 20 G: 20 SOLUTION ORAL at 21:22

## 2019-01-22 RX ADMIN — Medication 3 ML: at 08:35

## 2019-01-22 RX ADMIN — CEFTRIAXONE SODIUM 1 G: 1 INJECTION, SOLUTION INTRAVENOUS at 06:31

## 2019-01-22 RX ADMIN — BISACODYL 10 MG: 10 SUPPOSITORY RECTAL at 16:15

## 2019-01-22 RX ADMIN — SIMETHICONE CHEW TAB 80 MG 80 MG: 80 TABLET ORAL at 16:15

## 2019-01-22 RX ADMIN — VANCOMYCIN HYDROCHLORIDE 2000 MG: 10 INJECTION, POWDER, LYOPHILIZED, FOR SOLUTION INTRAVENOUS at 15:39

## 2019-01-22 RX ADMIN — INSULIN GLARGINE 18 UNITS: 100 INJECTION, SOLUTION SUBCUTANEOUS at 06:31

## 2019-01-22 RX ADMIN — SIMETHICONE CHEW TAB 80 MG 80 MG: 80 TABLET ORAL at 21:22

## 2019-01-22 RX ADMIN — HYDROCODONE BITARTRATE AND ACETAMINOPHEN 1 TABLET: 7.5; 325 TABLET ORAL at 16:15

## 2019-01-23 ENCOUNTER — APPOINTMENT (OUTPATIENT)
Dept: CARDIOLOGY | Facility: HOSPITAL | Age: 59
End: 2019-01-23
Attending: INTERNAL MEDICINE

## 2019-01-23 PROBLEM — A41.9 SEPSIS WITH METABOLIC ENCEPHALOPATHY (HCC): Status: ACTIVE | Noted: 2019-01-21

## 2019-01-23 PROBLEM — R78.81 BACTEREMIA: Status: ACTIVE | Noted: 2019-01-23

## 2019-01-23 PROBLEM — G93.41 METABOLIC ENCEPHALOPATHY: Status: ACTIVE | Noted: 2019-01-21

## 2019-01-23 PROBLEM — R65.20 SEPSIS WITH METABOLIC ENCEPHALOPATHY (HCC): Status: ACTIVE | Noted: 2019-01-21

## 2019-01-23 PROBLEM — R56.9 SEIZURE-LIKE ACTIVITY (HCC): Status: ACTIVE | Noted: 2019-01-23

## 2019-01-23 PROBLEM — I95.9 HYPOTENSION: Status: ACTIVE | Noted: 2019-01-23

## 2019-01-23 LAB
ALBUMIN SERPL-MCNC: 3.3 G/DL (ref 3.5–5.2)
ALBUMIN/GLOB SERPL: 0.7 G/DL
ALP SERPL-CCNC: 133 U/L (ref 39–117)
ALT SERPL W P-5'-P-CCNC: 11 U/L (ref 1–33)
ANION GAP SERPL CALCULATED.3IONS-SCNC: 17.8 MMOL/L
AORTIC DIMENSIONLESS INDEX: 0.6 (DI)
AST SERPL-CCNC: 11 U/L (ref 1–32)
BACTERIA UR QL AUTO: NORMAL /HPF
BH CV ECHO MEAS - ACS: 1.6 CM
BH CV ECHO MEAS - AO MAX PG: 6 MMHG
BH CV ECHO MEAS - AO MEAN PG (FULL): 2 MMHG
BH CV ECHO MEAS - AO MEAN PG: 3 MMHG
BH CV ECHO MEAS - AO ROOT AREA (BSA CORRECTED): 1.4
BH CV ECHO MEAS - AO ROOT AREA: 6.2 CM^2
BH CV ECHO MEAS - AO ROOT DIAM: 2.8 CM
BH CV ECHO MEAS - AO V2 MAX: 121 CM/SEC
BH CV ECHO MEAS - AO V2 MEAN: 83.4 CM/SEC
BH CV ECHO MEAS - AO V2 VTI: 22.9 CM
BH CV ECHO MEAS - AVA(I,A): 1.9 CM^2
BH CV ECHO MEAS - AVA(I,D): 1.9 CM^2
BH CV ECHO MEAS - BSA(HAYCOCK): 2.1 M^2
BH CV ECHO MEAS - BSA: 2 M^2
BH CV ECHO MEAS - BZI_BMI: 32.9 KILOGRAMS/M^2
BH CV ECHO MEAS - BZI_METRIC_HEIGHT: 167.6 CM
BH CV ECHO MEAS - BZI_METRIC_WEIGHT: 92.5 KG
BH CV ECHO MEAS - EDV(CUBED): 68.9 ML
BH CV ECHO MEAS - EDV(MOD-SP2): 83 ML
BH CV ECHO MEAS - EDV(MOD-SP4): 87 ML
BH CV ECHO MEAS - EDV(TEICH): 74.2 ML
BH CV ECHO MEAS - EF(CUBED): 71.4 %
BH CV ECHO MEAS - EF(MOD-BP): 69 %
BH CV ECHO MEAS - EF(MOD-SP2): 67.5 %
BH CV ECHO MEAS - EF(MOD-SP4): 71.3 %
BH CV ECHO MEAS - EF(TEICH): 63.6 %
BH CV ECHO MEAS - ESV(CUBED): 19.7 ML
BH CV ECHO MEAS - ESV(MOD-SP2): 27 ML
BH CV ECHO MEAS - ESV(MOD-SP4): 25 ML
BH CV ECHO MEAS - ESV(TEICH): 27 ML
BH CV ECHO MEAS - FS: 34.1 %
BH CV ECHO MEAS - IVS/LVPW: 1
BH CV ECHO MEAS - IVSD: 1.1 CM
BH CV ECHO MEAS - LAT PEAK E' VEL: 7 CM/SEC
BH CV ECHO MEAS - LV DIASTOLIC VOL/BSA (35-75): 43.1 ML/M^2
BH CV ECHO MEAS - LV MASS(C)D: 151.3 GRAMS
BH CV ECHO MEAS - LV MASS(C)DI: 75 GRAMS/M^2
BH CV ECHO MEAS - LV MEAN PG: 1 MMHG
BH CV ECHO MEAS - LV SYSTOLIC VOL/BSA (12-30): 12.4 ML/M^2
BH CV ECHO MEAS - LV V1 MAX: 63 CM/SEC
BH CV ECHO MEAS - LV V1 MEAN: 44.3 CM/SEC
BH CV ECHO MEAS - LV V1 VTI: 13.9 CM
BH CV ECHO MEAS - LVIDD: 4.1 CM
BH CV ECHO MEAS - LVIDS: 2.7 CM
BH CV ECHO MEAS - LVLD AP2: 8 CM
BH CV ECHO MEAS - LVLD AP4: 7.9 CM
BH CV ECHO MEAS - LVLS AP2: 6.5 CM
BH CV ECHO MEAS - LVLS AP4: 6.2 CM
BH CV ECHO MEAS - LVOT AREA (M): 3.1 CM^2
BH CV ECHO MEAS - LVOT AREA: 3.1 CM^2
BH CV ECHO MEAS - LVOT DIAM: 2 CM
BH CV ECHO MEAS - LVPWD: 1.1 CM
BH CV ECHO MEAS - MED PEAK E' VEL: 6 CM/SEC
BH CV ECHO MEAS - MR MAX PG: 23.4 MMHG
BH CV ECHO MEAS - MR MAX VEL: 242 CM/SEC
BH CV ECHO MEAS - MV A DUR: 0.12 SEC
BH CV ECHO MEAS - MV A MAX VEL: 70.3 CM/SEC
BH CV ECHO MEAS - MV DEC SLOPE: 450 CM/SEC^2
BH CV ECHO MEAS - MV DEC TIME: 0.13 SEC
BH CV ECHO MEAS - MV E MAX VEL: 35.8 CM/SEC
BH CV ECHO MEAS - MV E/A: 0.51
BH CV ECHO MEAS - MV MEAN PG: 1 MMHG
BH CV ECHO MEAS - MV P1/2T MAX VEL: 66.4 CM/SEC
BH CV ECHO MEAS - MV P1/2T: 43.2 MSEC
BH CV ECHO MEAS - MV V2 MEAN: 41.7 CM/SEC
BH CV ECHO MEAS - MV V2 VTI: 15.1 CM
BH CV ECHO MEAS - MVA P1/2T LCG: 3.3 CM^2
BH CV ECHO MEAS - MVA(P1/2T): 5.1 CM^2
BH CV ECHO MEAS - MVA(VTI): 2.9 CM^2
BH CV ECHO MEAS - PA ACC SLOPE: 9.6 CM/SEC^2
BH CV ECHO MEAS - PA ACC TIME: 0.08 SEC
BH CV ECHO MEAS - PA MAX PG (FULL): 2.4 MMHG
BH CV ECHO MEAS - PA MAX PG: 2.8 MMHG
BH CV ECHO MEAS - PA PR(ACCEL): 42.6 MMHG
BH CV ECHO MEAS - PA V2 MAX: 83.8 CM/SEC
BH CV ECHO MEAS - PULM A REVS DUR: 0.11 SEC
BH CV ECHO MEAS - PULM A REVS VEL: 30.2 CM/SEC
BH CV ECHO MEAS - PULM DIAS VEL: 33.6 CM/SEC
BH CV ECHO MEAS - PULM S/D: 1.4
BH CV ECHO MEAS - PULM SYS VEL: 46.3 CM/SEC
BH CV ECHO MEAS - RAP SYSTOLE: 3 MMHG
BH CV ECHO MEAS - RV MAX PG: 0.43 MMHG
BH CV ECHO MEAS - RV MEAN PG: 0 MMHG
BH CV ECHO MEAS - RV V1 MAX: 32.9 CM/SEC
BH CV ECHO MEAS - RV V1 MEAN: 25.1 CM/SEC
BH CV ECHO MEAS - RV V1 VTI: 8.3 CM
BH CV ECHO MEAS - RVSP: 18 MMHG
BH CV ECHO MEAS - SI(AO): 69.9 ML/M^2
BH CV ECHO MEAS - SI(CUBED): 24.4 ML/M^2
BH CV ECHO MEAS - SI(LVOT): 21.6 ML/M^2
BH CV ECHO MEAS - SI(MOD-SP2): 27.8 ML/M^2
BH CV ECHO MEAS - SI(MOD-SP4): 30.7 ML/M^2
BH CV ECHO MEAS - SI(TEICH): 23.4 ML/M^2
BH CV ECHO MEAS - SV(AO): 141 ML
BH CV ECHO MEAS - SV(CUBED): 49.2 ML
BH CV ECHO MEAS - SV(LVOT): 43.7 ML
BH CV ECHO MEAS - SV(MOD-SP2): 56 ML
BH CV ECHO MEAS - SV(MOD-SP4): 62 ML
BH CV ECHO MEAS - SV(TEICH): 47.2 ML
BH CV ECHO MEAS - TAPSE (>1.6): 2 CM2
BH CV ECHO MEAS - TR MAX PG: 15
BH CV ECHO MEAS - TR MAX VEL: 196 CM/SEC
BH CV ECHO MEASUREMENTS AVERAGE E/E' RATIO: 5.51
BH CV VAS BP RIGHT ARM: NORMAL MMHG
BH CV XLRA - RV BASE: 2.3 CM
BH CV XLRA - TDI S': 8 CM/SEC
BILIRUB SERPL-MCNC: 0.3 MG/DL (ref 0.1–1.2)
BILIRUB UR QL STRIP: NEGATIVE
BUN BLD-MCNC: 32 MG/DL (ref 6–20)
BUN/CREAT SERPL: 6.1 (ref 7–25)
CALCIUM SPEC-SCNC: 8.8 MG/DL (ref 8.6–10.5)
CHLORIDE SERPL-SCNC: 92 MMOL/L (ref 98–107)
CLARITY UR: CLEAR
CO2 SERPL-SCNC: 26.2 MMOL/L (ref 22–29)
COLOR UR: YELLOW
CREAT BLD-MCNC: 5.22 MG/DL (ref 0.57–1)
D-LACTATE SERPL-SCNC: 0.9 MMOL/L (ref 0.5–2)
DEPRECATED RDW RBC AUTO: 59 FL (ref 37–54)
ERYTHROCYTE [DISTWIDTH] IN BLOOD BY AUTOMATED COUNT: 17.2 % (ref 11.7–13)
GFR SERPL CREATININE-BSD FRML MDRD: 10 ML/MIN/1.73
GFR SERPL CREATININE-BSD FRML MDRD: ABNORMAL ML/MIN/1.73
GLOBULIN UR ELPH-MCNC: 4.7 GM/DL
GLUCOSE BLD-MCNC: 96 MG/DL (ref 65–99)
GLUCOSE BLDC GLUCOMTR-MCNC: 101 MG/DL (ref 70–130)
GLUCOSE BLDC GLUCOMTR-MCNC: 108 MG/DL (ref 70–130)
GLUCOSE BLDC GLUCOMTR-MCNC: 119 MG/DL (ref 70–130)
GLUCOSE BLDC GLUCOMTR-MCNC: 135 MG/DL (ref 70–130)
GLUCOSE BLDC GLUCOMTR-MCNC: 136 MG/DL (ref 70–130)
GLUCOSE BLDC GLUCOMTR-MCNC: 153 MG/DL (ref 70–130)
GLUCOSE UR STRIP-MCNC: NEGATIVE MG/DL
HCT VFR BLD AUTO: 35.9 % (ref 35.6–45.5)
HGB BLD-MCNC: 11.1 G/DL (ref 11.9–15.5)
HGB UR QL STRIP.AUTO: NEGATIVE
HYALINE CASTS UR QL AUTO: NORMAL /LPF
KETONES UR QL STRIP: ABNORMAL
LEFT ATRIUM VOLUME INDEX: 16 ML/M2
LEUKOCYTE ESTERASE UR QL STRIP.AUTO: ABNORMAL
LV EF 2D ECHO EST: 69 %
MAGNESIUM SERPL-MCNC: 2.2 MG/DL (ref 1.6–2.6)
MAXIMAL PREDICTED HEART RATE: 162 BPM
MCH RBC QN AUTO: 29.1 PG (ref 26.9–32)
MCHC RBC AUTO-ENTMCNC: 30.9 G/DL (ref 32.4–36.3)
MCV RBC AUTO: 94 FL (ref 80.5–98.2)
NITRITE UR QL STRIP: NEGATIVE
PH UR STRIP.AUTO: 6.5 [PH] (ref 5–8)
PLATELET # BLD AUTO: 186 10*3/MM3 (ref 140–500)
PMV BLD AUTO: 9.3 FL (ref 6–12)
POTASSIUM BLD-SCNC: 3.3 MMOL/L (ref 3.5–5.2)
POTASSIUM BLD-SCNC: 3.6 MMOL/L (ref 3.5–5.2)
PROT SERPL-MCNC: 8 G/DL (ref 6–8.5)
PROT UR QL STRIP: ABNORMAL
RBC # BLD AUTO: 3.82 10*6/MM3 (ref 3.9–5.2)
RBC # UR: NORMAL /HPF
REF LAB TEST METHOD: NORMAL
SODIUM BLD-SCNC: 136 MMOL/L (ref 136–145)
SP GR UR STRIP: 1.02 (ref 1–1.03)
SQUAMOUS #/AREA URNS HPF: NORMAL /HPF
STRESS TARGET HR: 138 BPM
UROBILINOGEN UR QL STRIP: ABNORMAL
VANCOMYCIN SERPL-MCNC: 27.3 MCG/ML (ref 5–40)
WBC NRBC COR # BLD: 8.46 10*3/MM3 (ref 4.5–10.7)
WBC UR QL AUTO: NORMAL /HPF

## 2019-01-23 PROCEDURE — 84132 ASSAY OF SERUM POTASSIUM: CPT | Performed by: INTERNAL MEDICINE

## 2019-01-23 PROCEDURE — 99233 SBSQ HOSP IP/OBS HIGH 50: CPT | Performed by: INTERNAL MEDICINE

## 2019-01-23 PROCEDURE — 80053 COMPREHEN METABOLIC PANEL: CPT | Performed by: INTERNAL MEDICINE

## 2019-01-23 PROCEDURE — 83735 ASSAY OF MAGNESIUM: CPT | Performed by: INTERNAL MEDICINE

## 2019-01-23 PROCEDURE — 63710000001 INSULIN GLARGINE PER 5 UNITS: Performed by: HOSPITALIST

## 2019-01-23 PROCEDURE — 99233 SBSQ HOSP IP/OBS HIGH 50: CPT | Performed by: NURSE PRACTITIONER

## 2019-01-23 PROCEDURE — 93306 TTE W/DOPPLER COMPLETE: CPT | Performed by: INTERNAL MEDICINE

## 2019-01-23 PROCEDURE — 82962 GLUCOSE BLOOD TEST: CPT

## 2019-01-23 PROCEDURE — 25010000002 AMPICILLIN PER 500 MG: Performed by: INTERNAL MEDICINE

## 2019-01-23 PROCEDURE — 81001 URINALYSIS AUTO W/SCOPE: CPT | Performed by: INTERNAL MEDICINE

## 2019-01-23 PROCEDURE — 93306 TTE W/DOPPLER COMPLETE: CPT

## 2019-01-23 PROCEDURE — 80202 ASSAY OF VANCOMYCIN: CPT | Performed by: INTERNAL MEDICINE

## 2019-01-23 PROCEDURE — 25010000002 METOCLOPRAMIDE PER 10 MG: Performed by: INTERNAL MEDICINE

## 2019-01-23 PROCEDURE — 5A1D70Z PERFORMANCE OF URINARY FILTRATION, INTERMITTENT, LESS THAN 6 HOURS PER DAY: ICD-10-PCS | Performed by: INTERNAL MEDICINE

## 2019-01-23 PROCEDURE — 85027 COMPLETE CBC AUTOMATED: CPT | Performed by: INTERNAL MEDICINE

## 2019-01-23 PROCEDURE — 83605 ASSAY OF LACTIC ACID: CPT | Performed by: INTERNAL MEDICINE

## 2019-01-23 RX ORDER — METOCLOPRAMIDE HYDROCHLORIDE 5 MG/ML
10 INJECTION INTRAMUSCULAR; INTRAVENOUS ONCE
Status: COMPLETED | OUTPATIENT
Start: 2019-01-23 | End: 2019-01-23

## 2019-01-23 RX ORDER — LEVETIRACETAM 500 MG/1
500 TABLET ORAL EVERY 12 HOURS SCHEDULED
Status: DISCONTINUED | OUTPATIENT
Start: 2019-01-23 | End: 2019-01-27 | Stop reason: HOSPADM

## 2019-01-23 RX ORDER — POTASSIUM CHLORIDE 1.5 G/1.77G
20 POWDER, FOR SOLUTION ORAL ONCE
Status: COMPLETED | OUTPATIENT
Start: 2019-01-23 | End: 2019-01-23

## 2019-01-23 RX ADMIN — SIMETHICONE CHEW TAB 80 MG 80 MG: 80 TABLET ORAL at 15:41

## 2019-01-23 RX ADMIN — HYDROCODONE BITARTRATE AND ACETAMINOPHEN 1 TABLET: 7.5; 325 TABLET ORAL at 02:48

## 2019-01-23 RX ADMIN — BISACODYL 10 MG: 10 SUPPOSITORY RECTAL at 10:19

## 2019-01-23 RX ADMIN — DOCUSATE SODIUM 250 MG: 50 CAPSULE, LIQUID FILLED ORAL at 11:41

## 2019-01-23 RX ADMIN — LEVETIRACETAM 500 MG: 500 TABLET, FILM COATED ORAL at 20:11

## 2019-01-23 RX ADMIN — METOCLOPRAMIDE 10 MG: 5 INJECTION, SOLUTION INTRAMUSCULAR; INTRAVENOUS at 10:12

## 2019-01-23 RX ADMIN — LEVETIRACETAM 500 MG: 500 TABLET, FILM COATED ORAL at 11:42

## 2019-01-23 RX ADMIN — INSULIN GLARGINE 18 UNITS: 100 INJECTION, SOLUTION SUBCUTANEOUS at 08:34

## 2019-01-23 RX ADMIN — AMPICILLIN SODIUM 2 G: 2 INJECTION, POWDER, FOR SOLUTION INTRAMUSCULAR; INTRAVENOUS at 02:43

## 2019-01-23 RX ADMIN — SIMETHICONE CHEW TAB 80 MG 80 MG: 80 TABLET ORAL at 20:11

## 2019-01-23 RX ADMIN — SIMETHICONE CHEW TAB 80 MG 80 MG: 80 TABLET ORAL at 08:33

## 2019-01-23 RX ADMIN — LACTULOSE 20 G: 20 SOLUTION ORAL at 08:34

## 2019-01-23 RX ADMIN — HYDROCODONE BITARTRATE AND ACETAMINOPHEN 1 TABLET: 7.5; 325 TABLET ORAL at 15:40

## 2019-01-23 RX ADMIN — POTASSIUM CHLORIDE 20 MEQ: 1.5 POWDER, FOR SOLUTION ORAL at 15:53

## 2019-01-23 RX ADMIN — HYDROCODONE BITARTRATE AND ACETAMINOPHEN 1 TABLET: 7.5; 325 TABLET ORAL at 08:33

## 2019-01-24 PROBLEM — G40.909 SEIZURE DISORDER (HCC): Status: ACTIVE | Noted: 2019-01-23

## 2019-01-24 LAB
ANION GAP SERPL CALCULATED.3IONS-SCNC: 20.8 MMOL/L
BACTERIA BLD CULT: ABNORMAL
BUN BLD-MCNC: 37 MG/DL (ref 6–20)
BUN/CREAT SERPL: 6 (ref 7–25)
CALCIUM SPEC-SCNC: 8.4 MG/DL (ref 8.6–10.5)
CHLORIDE SERPL-SCNC: 94 MMOL/L (ref 98–107)
CO2 SERPL-SCNC: 23.2 MMOL/L (ref 22–29)
CREAT BLD-MCNC: 6.13 MG/DL (ref 0.57–1)
DEPRECATED RDW RBC AUTO: 56.1 FL (ref 37–54)
ERYTHROCYTE [DISTWIDTH] IN BLOOD BY AUTOMATED COUNT: 17.1 % (ref 11.7–13)
GFR SERPL CREATININE-BSD FRML MDRD: 9 ML/MIN/1.73
GFR SERPL CREATININE-BSD FRML MDRD: ABNORMAL ML/MIN/1.73
GLUCOSE BLD-MCNC: 76 MG/DL (ref 65–99)
GLUCOSE BLDC GLUCOMTR-MCNC: 100 MG/DL (ref 70–130)
GLUCOSE BLDC GLUCOMTR-MCNC: 116 MG/DL (ref 70–130)
GLUCOSE BLDC GLUCOMTR-MCNC: 118 MG/DL (ref 70–130)
GLUCOSE BLDC GLUCOMTR-MCNC: 149 MG/DL (ref 70–130)
GLUCOSE BLDC GLUCOMTR-MCNC: 93 MG/DL (ref 70–130)
GLUCOSE BLDC GLUCOMTR-MCNC: 98 MG/DL (ref 70–130)
HCT VFR BLD AUTO: 35.3 % (ref 35.6–45.5)
HGB BLD-MCNC: 11.9 G/DL (ref 11.9–15.5)
MCH RBC QN AUTO: 30 PG (ref 26.9–32)
MCHC RBC AUTO-ENTMCNC: 33.7 G/DL (ref 32.4–36.3)
MCV RBC AUTO: 88.9 FL (ref 80.5–98.2)
PLATELET # BLD AUTO: 173 10*3/MM3 (ref 140–500)
PMV BLD AUTO: 9.9 FL (ref 6–12)
POTASSIUM BLD-SCNC: 4.2 MMOL/L (ref 3.5–5.2)
RBC # BLD AUTO: 3.97 10*6/MM3 (ref 3.9–5.2)
SODIUM BLD-SCNC: 138 MMOL/L (ref 136–145)
VANCOMYCIN SERPL-MCNC: 21.5 MCG/ML (ref 5–40)
WBC NRBC COR # BLD: 8.06 10*3/MM3 (ref 4.5–10.7)

## 2019-01-24 PROCEDURE — 93005 ELECTROCARDIOGRAM TRACING: CPT | Performed by: INTERNAL MEDICINE

## 2019-01-24 PROCEDURE — 80048 BASIC METABOLIC PNL TOTAL CA: CPT | Performed by: INTERNAL MEDICINE

## 2019-01-24 PROCEDURE — 85027 COMPLETE CBC AUTOMATED: CPT | Performed by: INTERNAL MEDICINE

## 2019-01-24 PROCEDURE — 82962 GLUCOSE BLOOD TEST: CPT

## 2019-01-24 PROCEDURE — 99233 SBSQ HOSP IP/OBS HIGH 50: CPT | Performed by: INTERNAL MEDICINE

## 2019-01-24 PROCEDURE — 80202 ASSAY OF VANCOMYCIN: CPT | Performed by: INTERNAL MEDICINE

## 2019-01-24 PROCEDURE — 93010 ELECTROCARDIOGRAM REPORT: CPT | Performed by: INTERNAL MEDICINE

## 2019-01-24 PROCEDURE — 99232 SBSQ HOSP IP/OBS MODERATE 35: CPT | Performed by: NURSE PRACTITIONER

## 2019-01-24 PROCEDURE — 25010000002 VANCOMYCIN 750 MG RECONSTITUTED SOLUTION: Performed by: INTERNAL MEDICINE

## 2019-01-24 PROCEDURE — 63710000001 INSULIN GLARGINE PER 5 UNITS: Performed by: HOSPITALIST

## 2019-01-24 RX ORDER — INSULIN GLARGINE 100 [IU]/ML
10 INJECTION, SOLUTION SUBCUTANEOUS EVERY MORNING
Status: DISCONTINUED | OUTPATIENT
Start: 2019-01-25 | End: 2019-01-26

## 2019-01-24 RX ADMIN — HYDROCODONE BITARTRATE AND ACETAMINOPHEN 1 TABLET: 7.5; 325 TABLET ORAL at 16:13

## 2019-01-24 RX ADMIN — SODIUM CHLORIDE 750 MG: 900 INJECTION, SOLUTION INTRAVENOUS at 15:04

## 2019-01-24 RX ADMIN — HYDROCODONE BITARTRATE AND ACETAMINOPHEN 1 TABLET: 7.5; 325 TABLET ORAL at 09:07

## 2019-01-24 RX ADMIN — HYDROCODONE BITARTRATE AND ACETAMINOPHEN 1 TABLET: 7.5; 325 TABLET ORAL at 22:01

## 2019-01-24 RX ADMIN — SIMETHICONE CHEW TAB 80 MG 80 MG: 80 TABLET ORAL at 15:03

## 2019-01-24 RX ADMIN — SIMETHICONE CHEW TAB 80 MG 80 MG: 80 TABLET ORAL at 22:02

## 2019-01-24 RX ADMIN — LACTULOSE 20 G: 20 SOLUTION ORAL at 12:38

## 2019-01-24 RX ADMIN — LEVETIRACETAM 500 MG: 500 TABLET, FILM COATED ORAL at 22:02

## 2019-01-24 RX ADMIN — SIMETHICONE CHEW TAB 80 MG 80 MG: 80 TABLET ORAL at 09:07

## 2019-01-24 RX ADMIN — LEVETIRACETAM 500 MG: 500 TABLET, FILM COATED ORAL at 09:07

## 2019-01-24 RX ADMIN — INSULIN GLARGINE 18 UNITS: 100 INJECTION, SOLUTION SUBCUTANEOUS at 06:22

## 2019-01-24 RX ADMIN — LACTULOSE 20 G: 20 SOLUTION ORAL at 22:01

## 2019-01-24 RX ADMIN — DOCUSATE SODIUM 250 MG: 50 CAPSULE, LIQUID FILLED ORAL at 12:38

## 2019-01-25 LAB
BACTERIA SPEC AEROBE CULT: ABNORMAL
GLUCOSE BLDC GLUCOMTR-MCNC: 100 MG/DL (ref 70–130)
GLUCOSE BLDC GLUCOMTR-MCNC: 125 MG/DL (ref 70–130)
GLUCOSE BLDC GLUCOMTR-MCNC: 158 MG/DL (ref 70–130)
GLUCOSE BLDC GLUCOMTR-MCNC: 96 MG/DL (ref 70–130)
GRAM STN SPEC: ABNORMAL
HBV SURFACE AG SERPL QL IA: NORMAL
MAGNESIUM SERPL-MCNC: 2.3 MG/DL (ref 1.6–2.6)
VANCOMYCIN SERPL-MCNC: 25 MCG/ML (ref 5–40)

## 2019-01-25 PROCEDURE — 87340 HEPATITIS B SURFACE AG IA: CPT | Performed by: INTERNAL MEDICINE

## 2019-01-25 PROCEDURE — 99232 SBSQ HOSP IP/OBS MODERATE 35: CPT | Performed by: INTERNAL MEDICINE

## 2019-01-25 PROCEDURE — 63710000001 INSULIN GLARGINE PER 5 UNITS: Performed by: INTERNAL MEDICINE

## 2019-01-25 PROCEDURE — 82962 GLUCOSE BLOOD TEST: CPT

## 2019-01-25 PROCEDURE — 83735 ASSAY OF MAGNESIUM: CPT | Performed by: INTERNAL MEDICINE

## 2019-01-25 PROCEDURE — 80202 ASSAY OF VANCOMYCIN: CPT | Performed by: INTERNAL MEDICINE

## 2019-01-25 RX ADMIN — DOCUSATE SODIUM 250 MG: 50 CAPSULE, LIQUID FILLED ORAL at 08:44

## 2019-01-25 RX ADMIN — LEVETIRACETAM 500 MG: 500 TABLET, FILM COATED ORAL at 21:04

## 2019-01-25 RX ADMIN — SIMETHICONE CHEW TAB 80 MG 80 MG: 80 TABLET ORAL at 21:04

## 2019-01-25 RX ADMIN — LACTULOSE 20 G: 20 SOLUTION ORAL at 21:04

## 2019-01-25 RX ADMIN — LEVETIRACETAM 500 MG: 500 TABLET, FILM COATED ORAL at 08:45

## 2019-01-25 RX ADMIN — HYDROCODONE BITARTRATE AND ACETAMINOPHEN 1 TABLET: 7.5; 325 TABLET ORAL at 08:44

## 2019-01-25 RX ADMIN — SIMETHICONE CHEW TAB 80 MG 80 MG: 80 TABLET ORAL at 16:44

## 2019-01-25 RX ADMIN — SIMETHICONE CHEW TAB 80 MG 80 MG: 80 TABLET ORAL at 08:45

## 2019-01-25 RX ADMIN — INSULIN GLARGINE 10 UNITS: 100 INJECTION, SOLUTION SUBCUTANEOUS at 08:47

## 2019-01-25 RX ADMIN — LACTULOSE 20 G: 20 SOLUTION ORAL at 08:44

## 2019-01-26 VITALS
DIASTOLIC BLOOD PRESSURE: 88 MMHG | HEIGHT: 66 IN | WEIGHT: 204 LBS | TEMPERATURE: 98.6 F | RESPIRATION RATE: 16 BRPM | SYSTOLIC BLOOD PRESSURE: 140 MMHG | HEART RATE: 80 BPM | OXYGEN SATURATION: 97 % | BODY MASS INDEX: 32.78 KG/M2

## 2019-01-26 PROBLEM — I95.9 HYPOTENSION: Status: RESOLVED | Noted: 2019-01-23 | Resolved: 2019-01-26

## 2019-01-26 PROBLEM — R41.0 CONFUSION: Status: RESOLVED | Noted: 2019-01-21 | Resolved: 2019-01-26

## 2019-01-26 LAB
GLUCOSE BLDC GLUCOMTR-MCNC: 100 MG/DL (ref 70–130)
GLUCOSE BLDC GLUCOMTR-MCNC: 123 MG/DL (ref 70–130)
GLUCOSE BLDC GLUCOMTR-MCNC: 91 MG/DL (ref 70–130)

## 2019-01-26 PROCEDURE — 82962 GLUCOSE BLOOD TEST: CPT

## 2019-01-26 PROCEDURE — 63710000001 INSULIN GLARGINE PER 5 UNITS: Performed by: INTERNAL MEDICINE

## 2019-01-26 RX ORDER — CARVEDILOL 12.5 MG/1
6.25 TABLET ORAL 2 TIMES DAILY WITH MEALS
Start: 2019-01-26

## 2019-01-26 RX ORDER — LEVETIRACETAM 500 MG/1
500 TABLET ORAL EVERY 12 HOURS SCHEDULED
Qty: 60 TABLET | Refills: 0 | Status: SHIPPED | OUTPATIENT
Start: 2019-01-26

## 2019-01-26 RX ORDER — DOXAZOSIN MESYLATE 4 MG/1
2 TABLET ORAL DAILY
Start: 2019-01-26

## 2019-01-26 RX ORDER — HYDROCODONE BITARTRATE AND ACETAMINOPHEN 7.5; 325 MG/1; MG/1
1 TABLET ORAL EVERY 6 HOURS PRN
Qty: 12 TABLET | Refills: 0 | Status: SHIPPED | OUTPATIENT
Start: 2019-01-26 | End: 2019-01-28 | Stop reason: SDUPTHER

## 2019-01-26 RX ADMIN — DOCUSATE SODIUM 250 MG: 50 CAPSULE, LIQUID FILLED ORAL at 09:22

## 2019-01-26 RX ADMIN — SIMETHICONE CHEW TAB 80 MG 80 MG: 80 TABLET ORAL at 17:14

## 2019-01-26 RX ADMIN — INSULIN GLARGINE 10 UNITS: 100 INJECTION, SOLUTION SUBCUTANEOUS at 09:22

## 2019-01-26 RX ADMIN — LACTULOSE 20 G: 20 SOLUTION ORAL at 09:23

## 2019-01-26 RX ADMIN — SIMETHICONE CHEW TAB 80 MG 80 MG: 80 TABLET ORAL at 19:43

## 2019-01-26 RX ADMIN — SIMETHICONE CHEW TAB 80 MG 80 MG: 80 TABLET ORAL at 09:23

## 2019-01-26 RX ADMIN — LEVETIRACETAM 500 MG: 500 TABLET, FILM COATED ORAL at 19:43

## 2019-01-26 RX ADMIN — LEVETIRACETAM 500 MG: 500 TABLET, FILM COATED ORAL at 09:23

## 2019-01-26 RX ADMIN — LACTULOSE 20 G: 20 SOLUTION ORAL at 19:43

## 2019-01-27 LAB — BACTERIA SPEC AEROBE CULT: NORMAL

## 2019-01-28 RX ORDER — HYDROCODONE BITARTRATE AND ACETAMINOPHEN 7.5; 325 MG/1; MG/1
1 TABLET ORAL EVERY 6 HOURS PRN
Qty: 240 TABLET | Refills: 0 | Status: ON HOLD | OUTPATIENT
Start: 2019-01-28 | End: 2019-02-16 | Stop reason: SDUPTHER

## 2019-01-31 ENCOUNTER — OUTSIDE FACILITY SERVICE (OUTPATIENT)
Dept: INTERNAL MEDICINE | Facility: CLINIC | Age: 59
End: 2019-01-31

## 2019-01-31 PROCEDURE — 99309 SBSQ NF CARE MODERATE MDM 30: CPT | Performed by: NURSE PRACTITIONER

## 2019-02-13 ENCOUNTER — APPOINTMENT (OUTPATIENT)
Dept: GENERAL RADIOLOGY | Facility: HOSPITAL | Age: 59
End: 2019-02-13

## 2019-02-13 ENCOUNTER — APPOINTMENT (OUTPATIENT)
Dept: CT IMAGING | Facility: HOSPITAL | Age: 59
End: 2019-02-13

## 2019-02-13 ENCOUNTER — HOSPITAL ENCOUNTER (INPATIENT)
Facility: HOSPITAL | Age: 59
LOS: 3 days | Discharge: NURSING, REHAB OR LONG-TERM CARE | End: 2019-02-16
Attending: EMERGENCY MEDICINE | Admitting: INTERNAL MEDICINE

## 2019-02-13 DIAGNOSIS — Z99.2 ESRD (END STAGE RENAL DISEASE) ON DIALYSIS (HCC): ICD-10-CM

## 2019-02-13 DIAGNOSIS — N39.0 ACUTE UTI: ICD-10-CM

## 2019-02-13 DIAGNOSIS — N18.6 ESRD (END STAGE RENAL DISEASE) ON DIALYSIS (HCC): ICD-10-CM

## 2019-02-13 DIAGNOSIS — G93.41 METABOLIC ENCEPHALOPATHY: Primary | ICD-10-CM

## 2019-02-13 LAB
ALBUMIN SERPL-MCNC: 3.7 G/DL (ref 3.5–5.2)
ALBUMIN/GLOB SERPL: 0.8 G/DL
ALP SERPL-CCNC: 123 U/L (ref 39–117)
ALT SERPL W P-5'-P-CCNC: 11 U/L (ref 1–33)
ANION GAP SERPL CALCULATED.3IONS-SCNC: 12.9 MMOL/L
AST SERPL-CCNC: 8 U/L (ref 1–32)
BACTERIA UR QL AUTO: ABNORMAL /HPF
BASOPHILS # BLD AUTO: 0.03 10*3/MM3 (ref 0–0.2)
BASOPHILS NFR BLD AUTO: 0.4 % (ref 0–1.5)
BILIRUB SERPL-MCNC: 0.3 MG/DL (ref 0.1–1.2)
BILIRUB UR QL STRIP: NEGATIVE
BUN BLD-MCNC: 31 MG/DL (ref 6–20)
BUN/CREAT SERPL: 6.6 (ref 7–25)
CALCIUM SPEC-SCNC: 9.4 MG/DL (ref 8.6–10.5)
CHLORIDE SERPL-SCNC: 91 MMOL/L (ref 98–107)
CLARITY UR: ABNORMAL
CO2 SERPL-SCNC: 33.1 MMOL/L (ref 22–29)
COLOR UR: YELLOW
CREAT BLD-MCNC: 4.68 MG/DL (ref 0.57–1)
D-LACTATE SERPL-SCNC: 2.1 MMOL/L (ref 0.5–2)
D-LACTATE SERPL-SCNC: 2.2 MMOL/L (ref 0.5–2)
DEPRECATED RDW RBC AUTO: 58.4 FL (ref 37–54)
EOSINOPHIL # BLD AUTO: 0.11 10*3/MM3 (ref 0–0.4)
EOSINOPHIL NFR BLD AUTO: 1.5 % (ref 0.3–6.2)
ERYTHROCYTE [DISTWIDTH] IN BLOOD BY AUTOMATED COUNT: 17.2 % (ref 12.3–15.4)
GFR SERPL CREATININE-BSD FRML MDRD: 12 ML/MIN/1.73
GFR SERPL CREATININE-BSD FRML MDRD: ABNORMAL ML/MIN/1.73
GLOBULIN UR ELPH-MCNC: 4.4 GM/DL
GLUCOSE BLD-MCNC: 156 MG/DL (ref 65–99)
GLUCOSE BLDC GLUCOMTR-MCNC: 128 MG/DL (ref 70–130)
GLUCOSE BLDC GLUCOMTR-MCNC: 140 MG/DL (ref 70–130)
GLUCOSE BLDC GLUCOMTR-MCNC: 157 MG/DL (ref 70–130)
GLUCOSE UR STRIP-MCNC: NEGATIVE MG/DL
HBA1C MFR BLD: 7.1 % (ref 4.8–5.6)
HCT VFR BLD AUTO: 36.2 % (ref 34–46.6)
HGB BLD-MCNC: 11.3 G/DL (ref 12–15.9)
HGB UR QL STRIP.AUTO: ABNORMAL
HOLD SPECIMEN: NORMAL
HYALINE CASTS UR QL AUTO: ABNORMAL /LPF
IMM GRANULOCYTES # BLD AUTO: 0.03 10*3/MM3 (ref 0–0.05)
IMM GRANULOCYTES NFR BLD AUTO: 0.4 % (ref 0–0.5)
KETONES UR QL STRIP: ABNORMAL
LEUKOCYTE ESTERASE UR QL STRIP.AUTO: ABNORMAL
LYMPHOCYTES # BLD AUTO: 2.37 10*3/MM3 (ref 0.7–3.1)
LYMPHOCYTES NFR BLD AUTO: 32.5 % (ref 19.6–45.3)
MCH RBC QN AUTO: 29 PG (ref 26.6–33)
MCHC RBC AUTO-ENTMCNC: 31.2 G/DL (ref 31.5–35.7)
MCV RBC AUTO: 92.8 FL (ref 79–97)
MONOCYTES # BLD AUTO: 0.5 10*3/MM3 (ref 0.1–0.9)
MONOCYTES NFR BLD AUTO: 6.8 % (ref 5–12)
NEUTROPHILS # BLD AUTO: 4.26 10*3/MM3 (ref 1.4–7)
NEUTROPHILS NFR BLD AUTO: 58.4 % (ref 42.7–76)
NITRITE UR QL STRIP: NEGATIVE
NRBC BLD AUTO-RTO: 0 /100 WBC (ref 0–0)
PH UR STRIP.AUTO: 8 [PH] (ref 5–8)
PLATELET # BLD AUTO: 249 10*3/MM3 (ref 140–450)
PMV BLD AUTO: 10 FL (ref 6–12)
POTASSIUM BLD-SCNC: 4.4 MMOL/L (ref 3.5–5.2)
PROCALCITONIN SERPL-MCNC: 0.4 NG/ML (ref 0.1–0.25)
PROT SERPL-MCNC: 8.1 G/DL (ref 6–8.5)
PROT UR QL STRIP: ABNORMAL
RBC # BLD AUTO: 3.9 10*6/MM3 (ref 3.77–5.28)
RBC # UR: ABNORMAL /HPF
REF LAB TEST METHOD: ABNORMAL
SODIUM BLD-SCNC: 137 MMOL/L (ref 136–145)
SP GR UR STRIP: 1.01 (ref 1–1.03)
SQUAMOUS #/AREA URNS HPF: ABNORMAL /HPF
TRANS CELLS #/AREA URNS HPF: ABNORMAL /HPF
UROBILINOGEN UR QL STRIP: ABNORMAL
WBC NRBC COR # BLD: 7.3 10*3/MM3 (ref 3.4–10.8)
WBC UR QL AUTO: ABNORMAL /HPF

## 2019-02-13 PROCEDURE — 63710000001 INSULIN LISPRO (HUMAN) PER 5 UNITS: Performed by: INTERNAL MEDICINE

## 2019-02-13 PROCEDURE — 83605 ASSAY OF LACTIC ACID: CPT | Performed by: EMERGENCY MEDICINE

## 2019-02-13 PROCEDURE — 5A1D70Z PERFORMANCE OF URINARY FILTRATION, INTERMITTENT, LESS THAN 6 HOURS PER DAY: ICD-10-PCS | Performed by: INTERNAL MEDICINE

## 2019-02-13 PROCEDURE — 36415 COLL VENOUS BLD VENIPUNCTURE: CPT | Performed by: EMERGENCY MEDICINE

## 2019-02-13 PROCEDURE — 87186 SC STD MICRODIL/AGAR DIL: CPT | Performed by: INTERNAL MEDICINE

## 2019-02-13 PROCEDURE — 81001 URINALYSIS AUTO W/SCOPE: CPT | Performed by: EMERGENCY MEDICINE

## 2019-02-13 PROCEDURE — 82962 GLUCOSE BLOOD TEST: CPT

## 2019-02-13 PROCEDURE — 99285 EMERGENCY DEPT VISIT HI MDM: CPT

## 2019-02-13 PROCEDURE — 83036 HEMOGLOBIN GLYCOSYLATED A1C: CPT | Performed by: INTERNAL MEDICINE

## 2019-02-13 PROCEDURE — 80053 COMPREHEN METABOLIC PANEL: CPT | Performed by: EMERGENCY MEDICINE

## 2019-02-13 PROCEDURE — 70450 CT HEAD/BRAIN W/O DYE: CPT

## 2019-02-13 PROCEDURE — 87150 DNA/RNA AMPLIFIED PROBE: CPT | Performed by: EMERGENCY MEDICINE

## 2019-02-13 PROCEDURE — 92610 EVALUATE SWALLOWING FUNCTION: CPT

## 2019-02-13 PROCEDURE — 84145 PROCALCITONIN (PCT): CPT | Performed by: EMERGENCY MEDICINE

## 2019-02-13 PROCEDURE — 25010000002 CEFTRIAXONE PER 250 MG: Performed by: EMERGENCY MEDICINE

## 2019-02-13 PROCEDURE — 87147 CULTURE TYPE IMMUNOLOGIC: CPT | Performed by: EMERGENCY MEDICINE

## 2019-02-13 PROCEDURE — 36415 COLL VENOUS BLD VENIPUNCTURE: CPT

## 2019-02-13 PROCEDURE — P9612 CATHETERIZE FOR URINE SPEC: HCPCS

## 2019-02-13 PROCEDURE — 87076 CULTURE ANAEROBE IDENT EACH: CPT | Performed by: EMERGENCY MEDICINE

## 2019-02-13 PROCEDURE — 85025 COMPLETE CBC W/AUTO DIFF WBC: CPT | Performed by: EMERGENCY MEDICINE

## 2019-02-13 PROCEDURE — 71045 X-RAY EXAM CHEST 1 VIEW: CPT

## 2019-02-13 PROCEDURE — 25010000002 LEVETIRACETAM IN NACL 0.82% 500 MG/100ML SOLUTION: Performed by: INTERNAL MEDICINE

## 2019-02-13 PROCEDURE — 87040 BLOOD CULTURE FOR BACTERIA: CPT | Performed by: EMERGENCY MEDICINE

## 2019-02-13 PROCEDURE — 87086 URINE CULTURE/COLONY COUNT: CPT | Performed by: INTERNAL MEDICINE

## 2019-02-13 PROCEDURE — 87077 CULTURE AEROBIC IDENTIFY: CPT | Performed by: INTERNAL MEDICINE

## 2019-02-13 RX ORDER — HYDROCODONE BITARTRATE AND ACETAMINOPHEN 7.5; 325 MG/1; MG/1
1 TABLET ORAL EVERY 6 HOURS PRN
Status: DISCONTINUED | OUTPATIENT
Start: 2019-02-13 | End: 2019-02-16 | Stop reason: HOSPADM

## 2019-02-13 RX ORDER — CEFTRIAXONE SODIUM 1 G/50ML
1 INJECTION, SOLUTION INTRAVENOUS EVERY 24 HOURS
Status: DISCONTINUED | OUTPATIENT
Start: 2019-02-13 | End: 2019-02-16

## 2019-02-13 RX ORDER — ACETAMINOPHEN 500 MG
500 TABLET ORAL EVERY 6 HOURS PRN
Status: DISCONTINUED | OUTPATIENT
Start: 2019-02-13 | End: 2019-02-16 | Stop reason: HOSPADM

## 2019-02-13 RX ORDER — NICOTINE POLACRILEX 4 MG
15 LOZENGE BUCCAL
Status: DISCONTINUED | OUTPATIENT
Start: 2019-02-13 | End: 2019-02-16 | Stop reason: HOSPADM

## 2019-02-13 RX ORDER — CEFTRIAXONE SODIUM 1 G/50ML
1 INJECTION, SOLUTION INTRAVENOUS ONCE
Status: COMPLETED | OUTPATIENT
Start: 2019-02-13 | End: 2019-02-13

## 2019-02-13 RX ORDER — DEXTROSE MONOHYDRATE 25 G/50ML
25 INJECTION, SOLUTION INTRAVENOUS
Status: DISCONTINUED | OUTPATIENT
Start: 2019-02-13 | End: 2019-02-16 | Stop reason: HOSPADM

## 2019-02-13 RX ORDER — ALBUMIN (HUMAN) 12.5 G/50ML
12.5 SOLUTION INTRAVENOUS AS NEEDED
Status: ACTIVE | OUTPATIENT
Start: 2019-02-13 | End: 2019-02-14

## 2019-02-13 RX ORDER — LEVETIRACETAM 5 MG/ML
500 INJECTION INTRAVASCULAR EVERY 12 HOURS SCHEDULED
Status: DISCONTINUED | OUTPATIENT
Start: 2019-02-13 | End: 2019-02-16 | Stop reason: HOSPADM

## 2019-02-13 RX ORDER — SEVELAMER CARBONATE 800 MG/1
800 TABLET, FILM COATED ORAL
Status: DISCONTINUED | OUTPATIENT
Start: 2019-02-13 | End: 2019-02-16 | Stop reason: HOSPADM

## 2019-02-13 RX ORDER — SODIUM CHLORIDE 0.9 % (FLUSH) 0.9 %
10 SYRINGE (ML) INJECTION AS NEEDED
Status: DISCONTINUED | OUTPATIENT
Start: 2019-02-13 | End: 2019-02-16 | Stop reason: HOSPADM

## 2019-02-13 RX ORDER — CARVEDILOL 6.25 MG/1
6.25 TABLET ORAL 2 TIMES DAILY WITH MEALS
Status: DISCONTINUED | OUTPATIENT
Start: 2019-02-13 | End: 2019-02-16 | Stop reason: HOSPADM

## 2019-02-13 RX ADMIN — INSULIN LISPRO 2 UNITS: 100 INJECTION, SOLUTION INTRAVENOUS; SUBCUTANEOUS at 21:07

## 2019-02-13 RX ADMIN — LEVETIRACETAM 500 MG: 5 INJECTION INTRAVENOUS at 13:42

## 2019-02-13 RX ADMIN — HYDROCODONE BITARTRATE AND ACETAMINOPHEN 1 TABLET: 7.5; 325 TABLET ORAL at 14:11

## 2019-02-13 RX ADMIN — LEVETIRACETAM 500 MG: 5 INJECTION INTRAVENOUS at 21:06

## 2019-02-13 RX ADMIN — SEVELAMER CARBONATE 800 MG: 800 TABLET, FILM COATED ORAL at 21:54

## 2019-02-13 RX ADMIN — CEFTRIAXONE SODIUM 1 G: 1 INJECTION, SOLUTION INTRAVENOUS at 10:52

## 2019-02-13 RX ADMIN — CARVEDILOL 6.25 MG: 6.25 TABLET, FILM COATED ORAL at 21:54

## 2019-02-14 LAB
ALBUMIN SERPL-MCNC: 3.4 G/DL (ref 3.5–5.2)
ANION GAP SERPL CALCULATED.3IONS-SCNC: 16.1 MMOL/L
BACTERIA BLD CULT: ABNORMAL
BUN BLD-MCNC: 21 MG/DL (ref 6–20)
BUN/CREAT SERPL: 5.5 (ref 7–25)
CALCIUM SPEC-SCNC: 9.1 MG/DL (ref 8.6–10.5)
CHLORIDE SERPL-SCNC: 101 MMOL/L (ref 98–107)
CO2 SERPL-SCNC: 23.9 MMOL/L (ref 22–29)
CREAT BLD-MCNC: 3.83 MG/DL (ref 0.57–1)
DEPRECATED RDW RBC AUTO: 59.8 FL (ref 37–54)
ERYTHROCYTE [DISTWIDTH] IN BLOOD BY AUTOMATED COUNT: 17.4 % (ref 12.3–15.4)
GFR SERPL CREATININE-BSD FRML MDRD: 15 ML/MIN/1.73
GLUCOSE BLD-MCNC: 138 MG/DL (ref 65–99)
GLUCOSE BLDC GLUCOMTR-MCNC: 126 MG/DL (ref 70–130)
GLUCOSE BLDC GLUCOMTR-MCNC: 130 MG/DL (ref 70–130)
GLUCOSE BLDC GLUCOMTR-MCNC: 165 MG/DL (ref 70–130)
GLUCOSE BLDC GLUCOMTR-MCNC: 167 MG/DL (ref 70–130)
HCT VFR BLD AUTO: 37.8 % (ref 34–46.6)
HGB BLD-MCNC: 11.6 G/DL (ref 12–15.9)
MAGNESIUM SERPL-MCNC: 2.3 MG/DL (ref 1.6–2.6)
MCH RBC QN AUTO: 28.9 PG (ref 26.6–33)
MCHC RBC AUTO-ENTMCNC: 30.7 G/DL (ref 31.5–35.7)
MCV RBC AUTO: 94.3 FL (ref 79–97)
PHOSPHATE SERPL-MCNC: 2.8 MG/DL (ref 2.5–4.5)
PLATELET # BLD AUTO: 226 10*3/MM3 (ref 140–450)
PMV BLD AUTO: 10.1 FL (ref 6–12)
POTASSIUM BLD-SCNC: 4.1 MMOL/L (ref 3.5–5.2)
RBC # BLD AUTO: 4.01 10*6/MM3 (ref 3.77–5.28)
SODIUM BLD-SCNC: 141 MMOL/L (ref 136–145)
WBC NRBC COR # BLD: 6.37 10*3/MM3 (ref 3.4–10.8)

## 2019-02-14 PROCEDURE — 82962 GLUCOSE BLOOD TEST: CPT

## 2019-02-14 PROCEDURE — 63710000001 INSULIN LISPRO (HUMAN) PER 5 UNITS: Performed by: INTERNAL MEDICINE

## 2019-02-14 PROCEDURE — 80069 RENAL FUNCTION PANEL: CPT | Performed by: INTERNAL MEDICINE

## 2019-02-14 PROCEDURE — 83735 ASSAY OF MAGNESIUM: CPT | Performed by: INTERNAL MEDICINE

## 2019-02-14 PROCEDURE — 85027 COMPLETE CBC AUTOMATED: CPT | Performed by: INTERNAL MEDICINE

## 2019-02-14 PROCEDURE — 25010000002 CEFTRIAXONE PER 250 MG: Performed by: INTERNAL MEDICINE

## 2019-02-14 PROCEDURE — 25010000002 LEVETIRACETAM IN NACL 0.82% 500 MG/100ML SOLUTION: Performed by: INTERNAL MEDICINE

## 2019-02-14 RX ADMIN — CARVEDILOL 6.25 MG: 6.25 TABLET, FILM COATED ORAL at 08:32

## 2019-02-14 RX ADMIN — INSULIN LISPRO 2 UNITS: 100 INJECTION, SOLUTION INTRAVENOUS; SUBCUTANEOUS at 21:41

## 2019-02-14 RX ADMIN — INSULIN LISPRO 2 UNITS: 100 INJECTION, SOLUTION INTRAVENOUS; SUBCUTANEOUS at 11:55

## 2019-02-14 RX ADMIN — LEVETIRACETAM 500 MG: 5 INJECTION INTRAVENOUS at 08:32

## 2019-02-14 RX ADMIN — CARVEDILOL 6.25 MG: 6.25 TABLET, FILM COATED ORAL at 17:47

## 2019-02-14 RX ADMIN — CEFTRIAXONE SODIUM 1 G: 1 INJECTION, SOLUTION INTRAVENOUS at 11:54

## 2019-02-14 RX ADMIN — SEVELAMER CARBONATE 800 MG: 800 TABLET, FILM COATED ORAL at 08:32

## 2019-02-14 RX ADMIN — SODIUM CHLORIDE, PRESERVATIVE FREE 10 ML: 5 INJECTION INTRAVENOUS at 08:32

## 2019-02-14 RX ADMIN — HYDROCODONE BITARTRATE AND ACETAMINOPHEN 1 TABLET: 7.5; 325 TABLET ORAL at 11:54

## 2019-02-14 RX ADMIN — SEVELAMER CARBONATE 800 MG: 800 TABLET, FILM COATED ORAL at 17:47

## 2019-02-14 RX ADMIN — SEVELAMER CARBONATE 800 MG: 800 TABLET, FILM COATED ORAL at 11:54

## 2019-02-14 RX ADMIN — LEVETIRACETAM 500 MG: 5 INJECTION INTRAVENOUS at 21:14

## 2019-02-15 LAB
ALBUMIN SERPL-MCNC: 3.6 G/DL (ref 3.5–5.2)
ANION GAP SERPL CALCULATED.3IONS-SCNC: 17.8 MMOL/L
BACTERIA SPEC AEROBE CULT: ABNORMAL
BUN BLD-MCNC: 30 MG/DL (ref 6–20)
BUN/CREAT SERPL: 6 (ref 7–25)
CALCIUM SPEC-SCNC: 9 MG/DL (ref 8.6–10.5)
CHLORIDE SERPL-SCNC: 98 MMOL/L (ref 98–107)
CO2 SERPL-SCNC: 22.2 MMOL/L (ref 22–29)
CREAT BLD-MCNC: 4.98 MG/DL (ref 0.57–1)
DEPRECATED RDW RBC AUTO: 61.1 FL (ref 37–54)
ERYTHROCYTE [DISTWIDTH] IN BLOOD BY AUTOMATED COUNT: 17.6 % (ref 12.3–15.4)
GFR SERPL CREATININE-BSD FRML MDRD: 11 ML/MIN/1.73
GFR SERPL CREATININE-BSD FRML MDRD: ABNORMAL ML/MIN/1.73
GLUCOSE BLD-MCNC: 113 MG/DL (ref 65–99)
GLUCOSE BLDC GLUCOMTR-MCNC: 110 MG/DL (ref 70–130)
GLUCOSE BLDC GLUCOMTR-MCNC: 154 MG/DL (ref 70–130)
GLUCOSE BLDC GLUCOMTR-MCNC: 162 MG/DL (ref 70–130)
GRAM STN SPEC: ABNORMAL
GRAM STN SPEC: ABNORMAL
HCT VFR BLD AUTO: 35.4 % (ref 34–46.6)
HGB BLD-MCNC: 11.1 G/DL (ref 12–15.9)
ISOLATED FROM: ABNORMAL
ISOLATED FROM: ABNORMAL
MCH RBC QN AUTO: 29.7 PG (ref 26.6–33)
MCHC RBC AUTO-ENTMCNC: 31.4 G/DL (ref 31.5–35.7)
MCV RBC AUTO: 94.7 FL (ref 79–97)
PHOSPHATE SERPL-MCNC: 3.6 MG/DL (ref 2.5–4.5)
PLATELET # BLD AUTO: 227 10*3/MM3 (ref 140–450)
PMV BLD AUTO: 10.1 FL (ref 6–12)
POTASSIUM BLD-SCNC: 4 MMOL/L (ref 3.5–5.2)
RBC # BLD AUTO: 3.74 10*6/MM3 (ref 3.77–5.28)
SODIUM BLD-SCNC: 138 MMOL/L (ref 136–145)
WBC NRBC COR # BLD: 6.07 10*3/MM3 (ref 3.4–10.8)

## 2019-02-15 PROCEDURE — 25010000002 LEVETIRACETAM IN NACL 0.82% 500 MG/100ML SOLUTION: Performed by: INTERNAL MEDICINE

## 2019-02-15 PROCEDURE — 82962 GLUCOSE BLOOD TEST: CPT

## 2019-02-15 PROCEDURE — 5A1D70Z PERFORMANCE OF URINARY FILTRATION, INTERMITTENT, LESS THAN 6 HOURS PER DAY: ICD-10-PCS | Performed by: INTERNAL MEDICINE

## 2019-02-15 PROCEDURE — 80069 RENAL FUNCTION PANEL: CPT | Performed by: INTERNAL MEDICINE

## 2019-02-15 PROCEDURE — 25010000002 HEPARIN (PORCINE) PER 1000 UNITS: Performed by: INTERNAL MEDICINE

## 2019-02-15 PROCEDURE — 25010000002 CEFTRIAXONE PER 250 MG: Performed by: INTERNAL MEDICINE

## 2019-02-15 PROCEDURE — 92526 ORAL FUNCTION THERAPY: CPT

## 2019-02-15 PROCEDURE — 85027 COMPLETE CBC AUTOMATED: CPT | Performed by: INTERNAL MEDICINE

## 2019-02-15 RX ORDER — HEPARIN SODIUM 5000 [USP'U]/ML
5000 INJECTION, SOLUTION INTRAVENOUS; SUBCUTANEOUS EVERY 12 HOURS SCHEDULED
Status: DISCONTINUED | OUTPATIENT
Start: 2019-02-15 | End: 2019-02-16 | Stop reason: HOSPADM

## 2019-02-15 RX ORDER — ALBUMIN (HUMAN) 12.5 G/50ML
12.5 SOLUTION INTRAVENOUS AS NEEDED
Status: ACTIVE | OUTPATIENT
Start: 2019-02-15 | End: 2019-02-15

## 2019-02-15 RX ADMIN — HYDROCODONE BITARTRATE AND ACETAMINOPHEN 1 TABLET: 7.5; 325 TABLET ORAL at 23:34

## 2019-02-15 RX ADMIN — CARVEDILOL 6.25 MG: 6.25 TABLET, FILM COATED ORAL at 18:35

## 2019-02-15 RX ADMIN — SEVELAMER CARBONATE 800 MG: 800 TABLET, FILM COATED ORAL at 08:10

## 2019-02-15 RX ADMIN — LEVETIRACETAM 500 MG: 5 INJECTION INTRAVENOUS at 08:10

## 2019-02-15 RX ADMIN — SODIUM CHLORIDE, PRESERVATIVE FREE 10 ML: 5 INJECTION INTRAVENOUS at 20:09

## 2019-02-15 RX ADMIN — CEFTRIAXONE SODIUM 1 G: 1 INJECTION, SOLUTION INTRAVENOUS at 14:02

## 2019-02-15 RX ADMIN — LEVETIRACETAM 500 MG: 5 INJECTION INTRAVENOUS at 20:09

## 2019-02-15 RX ADMIN — SEVELAMER CARBONATE 800 MG: 800 TABLET, FILM COATED ORAL at 14:02

## 2019-02-15 RX ADMIN — CARVEDILOL 6.25 MG: 6.25 TABLET, FILM COATED ORAL at 08:10

## 2019-02-15 RX ADMIN — HEPARIN SODIUM 5000 UNITS: 5000 INJECTION INTRAVENOUS; SUBCUTANEOUS at 20:09

## 2019-02-15 RX ADMIN — SEVELAMER CARBONATE 800 MG: 800 TABLET, FILM COATED ORAL at 18:35

## 2019-02-16 VITALS
HEART RATE: 88 BPM | HEIGHT: 70 IN | WEIGHT: 201.8 LBS | DIASTOLIC BLOOD PRESSURE: 73 MMHG | OXYGEN SATURATION: 97 % | BODY MASS INDEX: 28.89 KG/M2 | TEMPERATURE: 98.3 F | SYSTOLIC BLOOD PRESSURE: 106 MMHG | RESPIRATION RATE: 16 BRPM

## 2019-02-16 LAB
ALBUMIN SERPL-MCNC: 3.4 G/DL (ref 3.5–5.2)
ANION GAP SERPL CALCULATED.3IONS-SCNC: 15.5 MMOL/L
BASOPHILS # BLD AUTO: 0.05 10*3/MM3 (ref 0–0.2)
BASOPHILS NFR BLD AUTO: 0.8 % (ref 0–1.5)
BUN BLD-MCNC: 19 MG/DL (ref 6–20)
BUN/CREAT SERPL: 5.6 (ref 7–25)
CALCIUM SPEC-SCNC: 9.1 MG/DL (ref 8.6–10.5)
CHLORIDE SERPL-SCNC: 96 MMOL/L (ref 98–107)
CO2 SERPL-SCNC: 24.5 MMOL/L (ref 22–29)
CREAT BLD-MCNC: 3.42 MG/DL (ref 0.57–1)
DEPRECATED RDW RBC AUTO: 59.8 FL (ref 37–54)
EOSINOPHIL # BLD AUTO: 0.17 10*3/MM3 (ref 0–0.4)
EOSINOPHIL NFR BLD AUTO: 2.6 % (ref 0.3–6.2)
ERYTHROCYTE [DISTWIDTH] IN BLOOD BY AUTOMATED COUNT: 17.2 % (ref 12.3–15.4)
GFR SERPL CREATININE-BSD FRML MDRD: 17 ML/MIN/1.73
GLUCOSE BLD-MCNC: 122 MG/DL (ref 65–99)
GLUCOSE BLDC GLUCOMTR-MCNC: 116 MG/DL (ref 70–130)
GLUCOSE BLDC GLUCOMTR-MCNC: 119 MG/DL (ref 70–130)
GLUCOSE BLDC GLUCOMTR-MCNC: 143 MG/DL (ref 70–130)
HCT VFR BLD AUTO: 36.9 % (ref 34–46.6)
HGB BLD-MCNC: 11.4 G/DL (ref 12–15.9)
IMM GRANULOCYTES # BLD AUTO: 0.02 10*3/MM3 (ref 0–0.05)
IMM GRANULOCYTES NFR BLD AUTO: 0.3 % (ref 0–0.5)
LYMPHOCYTES # BLD AUTO: 2.78 10*3/MM3 (ref 0.7–3.1)
LYMPHOCYTES NFR BLD AUTO: 42 % (ref 19.6–45.3)
MCH RBC QN AUTO: 29.2 PG (ref 26.6–33)
MCHC RBC AUTO-ENTMCNC: 30.9 G/DL (ref 31.5–35.7)
MCV RBC AUTO: 94.4 FL (ref 79–97)
MONOCYTES # BLD AUTO: 0.7 10*3/MM3 (ref 0.1–0.9)
MONOCYTES NFR BLD AUTO: 10.6 % (ref 5–12)
NEUTROPHILS # BLD AUTO: 2.9 10*3/MM3 (ref 1.4–7)
NEUTROPHILS NFR BLD AUTO: 43.7 % (ref 42.7–76)
NRBC BLD AUTO-RTO: 0 /100 WBC (ref 0–0)
PHOSPHATE SERPL-MCNC: 2.6 MG/DL (ref 2.5–4.5)
PLATELET # BLD AUTO: 203 10*3/MM3 (ref 140–450)
PMV BLD AUTO: 10 FL (ref 6–12)
POTASSIUM BLD-SCNC: 3.7 MMOL/L (ref 3.5–5.2)
RBC # BLD AUTO: 3.91 10*6/MM3 (ref 3.77–5.28)
SODIUM BLD-SCNC: 136 MMOL/L (ref 136–145)
WBC NRBC COR # BLD: 6.62 10*3/MM3 (ref 3.4–10.8)

## 2019-02-16 PROCEDURE — 82962 GLUCOSE BLOOD TEST: CPT

## 2019-02-16 PROCEDURE — 25010000002 LEVETIRACETAM IN NACL 0.82% 500 MG/100ML SOLUTION: Performed by: INTERNAL MEDICINE

## 2019-02-16 PROCEDURE — 85025 COMPLETE CBC W/AUTO DIFF WBC: CPT | Performed by: INTERNAL MEDICINE

## 2019-02-16 PROCEDURE — 80069 RENAL FUNCTION PANEL: CPT | Performed by: INTERNAL MEDICINE

## 2019-02-16 PROCEDURE — 25010000002 HEPARIN (PORCINE) PER 1000 UNITS: Performed by: INTERNAL MEDICINE

## 2019-02-16 RX ORDER — HYDROCODONE BITARTRATE AND ACETAMINOPHEN 7.5; 325 MG/1; MG/1
1 TABLET ORAL EVERY 6 HOURS PRN
Qty: 30 TABLET | Refills: 0 | Status: SHIPPED | OUTPATIENT
Start: 2019-02-16 | End: 2019-03-25 | Stop reason: SDUPTHER

## 2019-02-16 RX ORDER — CEPHALEXIN 500 MG/1
500 CAPSULE ORAL 2 TIMES DAILY
Qty: 6 CAPSULE | Refills: 0 | Status: SHIPPED | OUTPATIENT
Start: 2019-02-16 | End: 2019-02-19

## 2019-02-16 RX ADMIN — SEVELAMER CARBONATE 800 MG: 800 TABLET, FILM COATED ORAL at 09:01

## 2019-02-16 RX ADMIN — HEPARIN SODIUM 5000 UNITS: 5000 INJECTION INTRAVENOUS; SUBCUTANEOUS at 09:01

## 2019-02-16 RX ADMIN — LEVETIRACETAM 500 MG: 5 INJECTION INTRAVENOUS at 09:01

## 2019-02-16 RX ADMIN — CARVEDILOL 6.25 MG: 6.25 TABLET, FILM COATED ORAL at 09:01

## 2019-02-18 ENCOUNTER — OUTSIDE FACILITY SERVICE (OUTPATIENT)
Dept: INTERNAL MEDICINE | Facility: CLINIC | Age: 59
End: 2019-02-18

## 2019-02-18 LAB — BACTERIA SPEC AEROBE CULT: NORMAL

## 2019-02-18 PROCEDURE — 99309 SBSQ NF CARE MODERATE MDM 30: CPT | Performed by: NURSE PRACTITIONER

## 2019-03-26 RX ORDER — HYDROCODONE BITARTRATE AND ACETAMINOPHEN 7.5; 325 MG/1; MG/1
1 TABLET ORAL EVERY 6 HOURS PRN
Qty: 240 TABLET | Refills: 0 | Status: SHIPPED | OUTPATIENT
Start: 2019-03-26 | End: 2019-04-01 | Stop reason: SDUPTHER

## 2019-04-03 RX ORDER — HYDROCODONE BITARTRATE AND ACETAMINOPHEN 7.5; 325 MG/1; MG/1
1 TABLET ORAL EVERY 6 HOURS PRN
Qty: 240 TABLET | Refills: 0 | Status: SHIPPED | OUTPATIENT
Start: 2019-04-03 | End: 2019-05-28 | Stop reason: SDUPTHER

## 2019-05-01 ENCOUNTER — OUTSIDE FACILITY SERVICE (OUTPATIENT)
Dept: INTERNAL MEDICINE | Facility: CLINIC | Age: 59
End: 2019-05-01

## 2019-05-01 PROCEDURE — 99307 SBSQ NF CARE SF MDM 10: CPT | Performed by: FAMILY MEDICINE

## 2019-05-28 RX ORDER — HYDROCODONE BITARTRATE AND ACETAMINOPHEN 7.5; 325 MG/1; MG/1
1 TABLET ORAL EVERY 6 HOURS PRN
Qty: 240 TABLET | Refills: 0 | Status: SHIPPED | OUTPATIENT
Start: 2019-05-28 | End: 2019-07-22 | Stop reason: SDUPTHER

## 2019-06-06 ENCOUNTER — OUTSIDE FACILITY SERVICE (OUTPATIENT)
Dept: INTERNAL MEDICINE | Facility: CLINIC | Age: 59
End: 2019-06-06

## 2019-06-06 PROCEDURE — 99309 SBSQ NF CARE MODERATE MDM 30: CPT | Performed by: NURSE PRACTITIONER

## 2019-07-17 ENCOUNTER — OUTSIDE FACILITY SERVICE (OUTPATIENT)
Dept: INTERNAL MEDICINE | Facility: CLINIC | Age: 59
End: 2019-07-17

## 2019-07-17 PROCEDURE — 99307 SBSQ NF CARE SF MDM 10: CPT | Performed by: FAMILY MEDICINE

## 2019-07-22 RX ORDER — HYDROCODONE BITARTRATE AND ACETAMINOPHEN 7.5; 325 MG/1; MG/1
1 TABLET ORAL EVERY 6 HOURS PRN
Qty: 240 TABLET | Refills: 0 | Status: SHIPPED | OUTPATIENT
Start: 2019-07-22 | End: 2019-09-16 | Stop reason: SDUPTHER

## 2019-08-22 ENCOUNTER — OUTSIDE FACILITY SERVICE (OUTPATIENT)
Dept: INTERNAL MEDICINE | Facility: CLINIC | Age: 59
End: 2019-08-22

## 2019-08-22 PROCEDURE — 99309 SBSQ NF CARE MODERATE MDM 30: CPT | Performed by: NURSE PRACTITIONER

## 2019-09-03 ENCOUNTER — OUTSIDE FACILITY SERVICE (OUTPATIENT)
Dept: INTERNAL MEDICINE | Facility: CLINIC | Age: 59
End: 2019-09-03

## 2019-09-03 PROCEDURE — 99307 SBSQ NF CARE SF MDM 10: CPT | Performed by: NURSE PRACTITIONER

## 2019-09-17 RX ORDER — HYDROCODONE BITARTRATE AND ACETAMINOPHEN 7.5; 325 MG/1; MG/1
1 TABLET ORAL EVERY 6 HOURS PRN
Qty: 120 TABLET | Refills: 0 | Status: SHIPPED | OUTPATIENT
Start: 2019-09-17 | End: 2019-11-12 | Stop reason: SDUPTHER

## 2019-10-02 ENCOUNTER — OUTSIDE FACILITY SERVICE (OUTPATIENT)
Dept: INTERNAL MEDICINE | Facility: CLINIC | Age: 59
End: 2019-10-02

## 2019-10-02 PROCEDURE — 99307 SBSQ NF CARE SF MDM 10: CPT | Performed by: FAMILY MEDICINE

## 2019-11-07 ENCOUNTER — OUTSIDE FACILITY SERVICE (OUTPATIENT)
Dept: INTERNAL MEDICINE | Facility: CLINIC | Age: 59
End: 2019-11-07

## 2019-11-07 PROCEDURE — 99309 SBSQ NF CARE MODERATE MDM 30: CPT | Performed by: NURSE PRACTITIONER

## 2019-11-12 RX ORDER — HYDROCODONE BITARTRATE AND ACETAMINOPHEN 7.5; 325 MG/1; MG/1
1 TABLET ORAL EVERY 6 HOURS PRN
Qty: 120 TABLET | Refills: 0 | Status: SHIPPED | OUTPATIENT
Start: 2019-11-12

## 2019-12-13 ENCOUNTER — OUTSIDE FACILITY SERVICE (OUTPATIENT)
Dept: INTERNAL MEDICINE | Facility: CLINIC | Age: 59
End: 2019-12-13

## 2019-12-13 PROCEDURE — 99309 SBSQ NF CARE MODERATE MDM 30: CPT | Performed by: NURSE PRACTITIONER

## 2021-01-01 ENCOUNTER — APPOINTMENT (OUTPATIENT)
Dept: GENERAL RADIOLOGY | Facility: HOSPITAL | Age: 61
End: 2021-01-01

## 2021-01-01 ENCOUNTER — APPOINTMENT (OUTPATIENT)
Dept: CARDIOLOGY | Facility: HOSPITAL | Age: 61
End: 2021-01-01

## 2021-01-01 ENCOUNTER — ANESTHESIA (OUTPATIENT)
Dept: PERIOP | Facility: HOSPITAL | Age: 61
End: 2021-01-01

## 2021-01-01 ENCOUNTER — ANESTHESIA EVENT (OUTPATIENT)
Dept: PERIOP | Facility: HOSPITAL | Age: 61
End: 2021-01-01

## 2021-01-01 ENCOUNTER — HOSPITAL ENCOUNTER (EMERGENCY)
Facility: HOSPITAL | Age: 61
Discharge: NURSING, REHAB OR LONG-TERM CARE | End: 2021-02-17
Attending: EMERGENCY MEDICINE | Admitting: EMERGENCY MEDICINE

## 2021-01-01 ENCOUNTER — HOSPITAL ENCOUNTER (INPATIENT)
Facility: HOSPITAL | Age: 61
LOS: 11 days | End: 2021-12-20
Attending: EMERGENCY MEDICINE | Admitting: INTERNAL MEDICINE

## 2021-01-01 VITALS
RESPIRATION RATE: 24 BRPM | BODY MASS INDEX: 29.18 KG/M2 | HEART RATE: 171 BPM | WEIGHT: 197 LBS | HEIGHT: 69 IN | DIASTOLIC BLOOD PRESSURE: 38 MMHG | TEMPERATURE: 99.6 F | SYSTOLIC BLOOD PRESSURE: 78 MMHG | OXYGEN SATURATION: 89 %

## 2021-01-01 VITALS
BODY MASS INDEX: 30.92 KG/M2 | RESPIRATION RATE: 16 BRPM | OXYGEN SATURATION: 96 % | TEMPERATURE: 97.6 F | HEART RATE: 98 BPM | DIASTOLIC BLOOD PRESSURE: 80 MMHG | WEIGHT: 216 LBS | SYSTOLIC BLOOD PRESSURE: 150 MMHG | HEIGHT: 70 IN

## 2021-01-01 DIAGNOSIS — R09.02 HYPOXIA: ICD-10-CM

## 2021-01-01 DIAGNOSIS — M62.3 IMMOBILITY SYNDROME: ICD-10-CM

## 2021-01-01 DIAGNOSIS — N18.6 ESRD ON HEMODIALYSIS (HCC): ICD-10-CM

## 2021-01-01 DIAGNOSIS — U07.1 PNEUMONIA DUE TO COVID-19 VIRUS: Primary | ICD-10-CM

## 2021-01-01 DIAGNOSIS — L08.9 PRESSURE INJURY, UNSTAGEABLE, WITH INFECTION (HCC): ICD-10-CM

## 2021-01-01 DIAGNOSIS — L89.95 PRESSURE INJURY, UNSTAGEABLE, WITH INFECTION (HCC): ICD-10-CM

## 2021-01-01 DIAGNOSIS — R07.9 CHEST PAIN, UNSPECIFIED TYPE: Primary | ICD-10-CM

## 2021-01-01 DIAGNOSIS — I47.1 SVT (SUPRAVENTRICULAR TACHYCARDIA) (HCC): ICD-10-CM

## 2021-01-01 DIAGNOSIS — Z99.2 ESRD ON HEMODIALYSIS (HCC): ICD-10-CM

## 2021-01-01 DIAGNOSIS — J12.82 PNEUMONIA DUE TO COVID-19 VIRUS: Primary | ICD-10-CM

## 2021-01-01 LAB
ALBUMIN SERPL-MCNC: 2.4 G/DL (ref 3.5–5.2)
ALBUMIN SERPL-MCNC: 2.5 G/DL (ref 3.5–5.2)
ALBUMIN SERPL-MCNC: 2.6 G/DL (ref 3.5–5.2)
ALBUMIN SERPL-MCNC: 2.7 G/DL (ref 3.5–5.2)
ALBUMIN SERPL-MCNC: 2.8 G/DL (ref 3.5–5.2)
ALBUMIN SERPL-MCNC: 2.9 G/DL (ref 3.5–5.2)
ALBUMIN SERPL-MCNC: 2.9 G/DL (ref 3.5–5.2)
ALBUMIN SERPL-MCNC: 3.3 G/DL (ref 3.5–5.2)
ALBUMIN SERPL-MCNC: 3.6 G/DL (ref 3.5–5.2)
ALBUMIN/GLOB SERPL: 0.4 G/DL
ALBUMIN/GLOB SERPL: 0.5 G/DL
ALBUMIN/GLOB SERPL: 0.6 G/DL
ALBUMIN/GLOB SERPL: 0.6 G/DL
ALBUMIN/GLOB SERPL: 0.8 G/DL
ALP SERPL-CCNC: 101 U/L (ref 39–117)
ALP SERPL-CCNC: 108 U/L (ref 39–117)
ALP SERPL-CCNC: 117 U/L (ref 39–117)
ALP SERPL-CCNC: 119 U/L (ref 39–117)
ALP SERPL-CCNC: 77 U/L (ref 39–117)
ALP SERPL-CCNC: 80 U/L (ref 39–117)
ALP SERPL-CCNC: 88 U/L (ref 39–117)
ALP SERPL-CCNC: 89 U/L (ref 39–117)
ALP SERPL-CCNC: 98 U/L (ref 39–117)
ALT SERPL W P-5'-P-CCNC: 13 U/L (ref 1–33)
ALT SERPL W P-5'-P-CCNC: 15 U/L (ref 1–33)
ALT SERPL W P-5'-P-CCNC: 15 U/L (ref 1–33)
ALT SERPL W P-5'-P-CCNC: 19 U/L (ref 1–33)
ALT SERPL W P-5'-P-CCNC: 20 U/L (ref 1–33)
ALT SERPL W P-5'-P-CCNC: 21 U/L (ref 1–33)
ALT SERPL W P-5'-P-CCNC: 26 U/L (ref 1–33)
ALT SERPL W P-5'-P-CCNC: 26 U/L (ref 1–33)
ALT SERPL W P-5'-P-CCNC: 31 U/L (ref 1–33)
ANION GAP SERPL CALCULATED.3IONS-SCNC: 12.1 MMOL/L (ref 5–15)
ANION GAP SERPL CALCULATED.3IONS-SCNC: 15.1 MMOL/L (ref 5–15)
ANION GAP SERPL CALCULATED.3IONS-SCNC: 15.8 MMOL/L (ref 5–15)
ANION GAP SERPL CALCULATED.3IONS-SCNC: 16.3 MMOL/L (ref 5–15)
ANION GAP SERPL CALCULATED.3IONS-SCNC: 16.5 MMOL/L (ref 5–15)
ANION GAP SERPL CALCULATED.3IONS-SCNC: 16.5 MMOL/L (ref 5–15)
ANION GAP SERPL CALCULATED.3IONS-SCNC: 16.7 MMOL/L (ref 5–15)
ANION GAP SERPL CALCULATED.3IONS-SCNC: 16.7 MMOL/L (ref 5–15)
ANION GAP SERPL CALCULATED.3IONS-SCNC: 17.8 MMOL/L (ref 5–15)
ANION GAP SERPL CALCULATED.3IONS-SCNC: 18.7 MMOL/L (ref 5–15)
ANISOCYTOSIS BLD QL: ABNORMAL
ARTERIAL PATENCY WRIST A: POSITIVE
AST SERPL-CCNC: 14 U/L (ref 1–32)
AST SERPL-CCNC: 15 U/L (ref 1–32)
AST SERPL-CCNC: 16 U/L (ref 1–32)
AST SERPL-CCNC: 18 U/L (ref 1–32)
AST SERPL-CCNC: 25 U/L (ref 1–32)
AST SERPL-CCNC: 33 U/L (ref 1–32)
AST SERPL-CCNC: 56 U/L (ref 1–32)
AST SERPL-CCNC: 73 U/L (ref 1–32)
AST SERPL-CCNC: 75 U/L (ref 1–32)
ATMOSPHERIC PRESS: 747.5 MMHG
B PARAPERT DNA SPEC QL NAA+PROBE: NOT DETECTED
B PERT DNA SPEC QL NAA+PROBE: NOT DETECTED
BACTERIA ISLT: NORMAL
BACTERIA SPEC AEROBE CULT: ABNORMAL
BACTERIA SPEC AEROBE CULT: NORMAL
BASE EXCESS BLDA CALC-SCNC: 4.5 MMOL/L (ref 0–2)
BASOPHILS # BLD AUTO: 0.03 10*3/MM3 (ref 0–0.2)
BASOPHILS # BLD AUTO: 0.03 10*3/MM3 (ref 0–0.2)
BASOPHILS # BLD AUTO: 0.04 10*3/MM3 (ref 0–0.2)
BASOPHILS NFR BLD AUTO: 0.3 % (ref 0–1.5)
BASOPHILS NFR BLD AUTO: 0.4 % (ref 0–1.5)
BASOPHILS NFR BLD AUTO: 0.5 % (ref 0–1.5)
BDY SITE: ABNORMAL
BH CV ECHO MEAS - AO MAX PG (FULL): 3.2 MMHG
BH CV ECHO MEAS - AO MAX PG: 6.5 MMHG
BH CV ECHO MEAS - AO MEAN PG (FULL): 2.5 MMHG
BH CV ECHO MEAS - AO MEAN PG: 4.2 MMHG
BH CV ECHO MEAS - AO V2 MAX: 127.6 CM/SEC
BH CV ECHO MEAS - AO V2 MEAN: 97.3 CM/SEC
BH CV ECHO MEAS - AO V2 VTI: 17.5 CM
BH CV ECHO MEAS - BSA(HAYCOCK): 2.1 M^2
BH CV ECHO MEAS - BSA: 2.1 M^2
BH CV ECHO MEAS - BZI_BMI: 29.1 KILOGRAMS/M^2
BH CV ECHO MEAS - BZI_METRIC_HEIGHT: 175.3 CM
BH CV ECHO MEAS - BZI_METRIC_WEIGHT: 89.4 KG
BH CV ECHO MEAS - EDV(MOD-SP2): 62 ML
BH CV ECHO MEAS - EDV(MOD-SP4): 99 ML
BH CV ECHO MEAS - EF(MOD-BP): 55.2 %
BH CV ECHO MEAS - EF(MOD-SP2): 53.2 %
BH CV ECHO MEAS - EF(MOD-SP4): 56.6 %
BH CV ECHO MEAS - ESV(MOD-SP2): 29 ML
BH CV ECHO MEAS - ESV(MOD-SP4): 43 ML
BH CV ECHO MEAS - LAT PEAK E' VEL: 8.5 CM/SEC
BH CV ECHO MEAS - LV DIASTOLIC VOL/BSA (35-75): 48.2 ML/M^2
BH CV ECHO MEAS - LV MAX PG: 3.3 MMHG
BH CV ECHO MEAS - LV MEAN PG: 1.7 MMHG
BH CV ECHO MEAS - LV SYSTOLIC VOL/BSA (12-30): 21 ML/M^2
BH CV ECHO MEAS - LV V1 MAX: 91.3 CM/SEC
BH CV ECHO MEAS - LV V1 MEAN: 60 CM/SEC
BH CV ECHO MEAS - LV V1 VTI: 14.3 CM
BH CV ECHO MEAS - LVLD AP2: 8.3 CM
BH CV ECHO MEAS - LVLD AP4: 8.8 CM
BH CV ECHO MEAS - LVLS AP2: 7.7 CM
BH CV ECHO MEAS - LVLS AP4: 8.4 CM
BH CV ECHO MEAS - MED PEAK E' VEL: 8.7 CM/SEC
BH CV ECHO MEAS - MV A MAX VEL: 104.5 CM/SEC
BH CV ECHO MEAS - MV DEC SLOPE: 359.8 CM/SEC^2
BH CV ECHO MEAS - MV DEC TIME: 212 SEC
BH CV ECHO MEAS - MV E MAX VEL: 60 CM/SEC
BH CV ECHO MEAS - MV E/A: 0.57
BH CV ECHO MEAS - MV MAX PG: 3.8 MMHG
BH CV ECHO MEAS - MV MEAN PG: 1.5 MMHG
BH CV ECHO MEAS - MV P1/2T MAX VEL: 65.2 CM/SEC
BH CV ECHO MEAS - MV P1/2T: 53 MSEC
BH CV ECHO MEAS - MV V2 MAX: 97.7 CM/SEC
BH CV ECHO MEAS - MV V2 MEAN: 56.8 CM/SEC
BH CV ECHO MEAS - MV V2 VTI: 13.2 CM
BH CV ECHO MEAS - MVA P1/2T LCG: 3.4 CM^2
BH CV ECHO MEAS - MVA(P1/2T): 4.1 CM^2
BH CV ECHO MEAS - SI(MOD-SP2): 16.1 ML/M^2
BH CV ECHO MEAS - SI(MOD-SP4): 27.3 ML/M^2
BH CV ECHO MEAS - SV(MOD-SP2): 33 ML
BH CV ECHO MEAS - SV(MOD-SP4): 56 ML
BH CV ECHO MEAS - TAPSE (>1.6): 2.2 CM
BH CV ECHO MEASUREMENTS AVERAGE E/E' RATIO: 6.98
BH CV XLRA - RV BASE: 2.1 CM
BH CV XLRA - RV LENGTH: 6.5 CM
BH CV XLRA - RV MID: 1.9 CM
BH CV XLRA - TDI S': 17.5 CM/SEC
BILIRUB CONJ SERPL-MCNC: 0.2 MG/DL (ref 0–0.3)
BILIRUB CONJ SERPL-MCNC: <0.2 MG/DL (ref 0–0.3)
BILIRUB SERPL-MCNC: 0.2 MG/DL (ref 0–1.2)
BILIRUB SERPL-MCNC: 0.3 MG/DL (ref 0–1.2)
BILIRUB SERPL-MCNC: 0.4 MG/DL (ref 0–1.2)
BUN SERPL-MCNC: 32 MG/DL (ref 8–23)
BUN SERPL-MCNC: 32 MG/DL (ref 8–23)
BUN SERPL-MCNC: 41 MG/DL (ref 8–23)
BUN SERPL-MCNC: 43 MG/DL (ref 8–23)
BUN SERPL-MCNC: 44 MG/DL (ref 8–23)
BUN SERPL-MCNC: 48 MG/DL (ref 8–23)
BUN SERPL-MCNC: 50 MG/DL (ref 8–23)
BUN SERPL-MCNC: 58 MG/DL (ref 8–23)
BUN SERPL-MCNC: 61 MG/DL (ref 8–23)
BUN SERPL-MCNC: 72 MG/DL (ref 8–23)
BUN/CREAT SERPL: 10.3 (ref 7–25)
BUN/CREAT SERPL: 10.8 (ref 7–25)
BUN/CREAT SERPL: 11.6 (ref 7–25)
BUN/CREAT SERPL: 12.2 (ref 7–25)
BUN/CREAT SERPL: 12.2 (ref 7–25)
BUN/CREAT SERPL: 8.6 (ref 7–25)
BUN/CREAT SERPL: 8.7 (ref 7–25)
BUN/CREAT SERPL: 8.9 (ref 7–25)
BUN/CREAT SERPL: 9.5 (ref 7–25)
BUN/CREAT SERPL: 9.7 (ref 7–25)
BURR CELLS BLD QL SMEAR: ABNORMAL
C PNEUM DNA NPH QL NAA+NON-PROBE: NOT DETECTED
C3 FRG RBC-MCNC: ABNORMAL
CALCIUM SPEC-SCNC: 9 MG/DL (ref 8.6–10.5)
CALCIUM SPEC-SCNC: 9.3 MG/DL (ref 8.6–10.5)
CALCIUM SPEC-SCNC: 9.4 MG/DL (ref 8.6–10.5)
CALCIUM SPEC-SCNC: 9.5 MG/DL (ref 8.6–10.5)
CALCIUM SPEC-SCNC: 9.6 MG/DL (ref 8.6–10.5)
CALCIUM SPEC-SCNC: 9.7 MG/DL (ref 8.6–10.5)
CALCIUM SPEC-SCNC: 9.8 MG/DL (ref 8.6–10.5)
CHLORIDE SERPL-SCNC: 94 MMOL/L (ref 98–107)
CHLORIDE SERPL-SCNC: 95 MMOL/L (ref 98–107)
CHLORIDE SERPL-SCNC: 96 MMOL/L (ref 98–107)
CHLORIDE SERPL-SCNC: 97 MMOL/L (ref 98–107)
CO2 SERPL-SCNC: 19.3 MMOL/L (ref 22–29)
CO2 SERPL-SCNC: 22.2 MMOL/L (ref 22–29)
CO2 SERPL-SCNC: 23.3 MMOL/L (ref 22–29)
CO2 SERPL-SCNC: 23.5 MMOL/L (ref 22–29)
CO2 SERPL-SCNC: 23.9 MMOL/L (ref 22–29)
CO2 SERPL-SCNC: 24.9 MMOL/L (ref 22–29)
CO2 SERPL-SCNC: 25.5 MMOL/L (ref 22–29)
CO2 SERPL-SCNC: 25.7 MMOL/L (ref 22–29)
CO2 SERPL-SCNC: 26.2 MMOL/L (ref 22–29)
CO2 SERPL-SCNC: 26.3 MMOL/L (ref 22–29)
CREAT SERPL-MCNC: 2.63 MG/DL (ref 0.57–1)
CREAT SERPL-MCNC: 3.36 MG/DL (ref 0.57–1)
CREAT SERPL-MCNC: 3.61 MG/DL (ref 0.57–1)
CREAT SERPL-MCNC: 3.67 MG/DL (ref 0.57–1)
CREAT SERPL-MCNC: 3.7 MG/DL (ref 0.57–1)
CREAT SERPL-MCNC: 4.41 MG/DL (ref 0.57–1)
CREAT SERPL-MCNC: 4.55 MG/DL (ref 0.57–1)
CREAT SERPL-MCNC: 4.64 MG/DL (ref 0.57–1)
CREAT SERPL-MCNC: 5.67 MG/DL (ref 0.57–1)
CREAT SERPL-MCNC: 5.79 MG/DL (ref 0.57–1)
CREAT SERPL-MCNC: 5.91 MG/DL (ref 0.57–1)
CREAT SERPL-MCNC: 6.12 MG/DL (ref 0.57–1)
CRP SERPL-MCNC: 44.52 MG/DL (ref 0–0.5)
D DIMER PPP FEU-MCNC: 11.52 MCGFEU/ML (ref 0–0.49)
D DIMER PPP FEU-MCNC: 3.19 MCGFEU/ML (ref 0–0.49)
D DIMER PPP FEU-MCNC: 3.4 MCGFEU/ML (ref 0–0.49)
D DIMER PPP FEU-MCNC: 3.72 MCGFEU/ML (ref 0–0.49)
D DIMER PPP FEU-MCNC: 3.84 MCGFEU/ML (ref 0–0.49)
D DIMER PPP FEU-MCNC: 5.07 MCGFEU/ML (ref 0–0.49)
D-LACTATE SERPL-SCNC: 1.3 MMOL/L (ref 0.5–2)
DEPRECATED RDW RBC AUTO: 45.5 FL (ref 37–54)
DEPRECATED RDW RBC AUTO: 46.1 FL (ref 37–54)
DEPRECATED RDW RBC AUTO: 46.9 FL (ref 37–54)
DEPRECATED RDW RBC AUTO: 47.2 FL (ref 37–54)
DEPRECATED RDW RBC AUTO: 47.5 FL (ref 37–54)
DEPRECATED RDW RBC AUTO: 47.5 FL (ref 37–54)
DEPRECATED RDW RBC AUTO: 48.3 FL (ref 37–54)
DEPRECATED RDW RBC AUTO: 48.9 FL (ref 37–54)
DEPRECATED RDW RBC AUTO: 50.3 FL (ref 37–54)
EOSINOPHIL # BLD AUTO: 0 10*3/MM3 (ref 0–0.4)
EOSINOPHIL # BLD AUTO: 0 10*3/MM3 (ref 0–0.4)
EOSINOPHIL # BLD AUTO: 0.07 10*3/MM3 (ref 0–0.4)
EOSINOPHIL NFR BLD AUTO: 0 % (ref 0.3–6.2)
EOSINOPHIL NFR BLD AUTO: 0 % (ref 0.3–6.2)
EOSINOPHIL NFR BLD AUTO: 0.9 % (ref 0.3–6.2)
ERYTHROCYTE [DISTWIDTH] IN BLOOD BY AUTOMATED COUNT: 14.4 % (ref 12.3–15.4)
ERYTHROCYTE [DISTWIDTH] IN BLOOD BY AUTOMATED COUNT: 14.5 % (ref 12.3–15.4)
ERYTHROCYTE [DISTWIDTH] IN BLOOD BY AUTOMATED COUNT: 14.6 % (ref 12.3–15.4)
ERYTHROCYTE [DISTWIDTH] IN BLOOD BY AUTOMATED COUNT: 14.6 % (ref 12.3–15.4)
ERYTHROCYTE [DISTWIDTH] IN BLOOD BY AUTOMATED COUNT: 14.7 % (ref 12.3–15.4)
ERYTHROCYTE [DISTWIDTH] IN BLOOD BY AUTOMATED COUNT: 14.7 % (ref 12.3–15.4)
ERYTHROCYTE [DISTWIDTH] IN BLOOD BY AUTOMATED COUNT: 14.8 % (ref 12.3–15.4)
FERRITIN SERPL-MCNC: 7660 NG/ML (ref 13–150)
FERRITIN SERPL-MCNC: 7954 NG/ML (ref 13–150)
FERRITIN SERPL-MCNC: 8480 NG/ML (ref 13–150)
FERRITIN SERPL-MCNC: ABNORMAL NG/ML (ref 13–150)
FERRITIN SERPL-MCNC: ABNORMAL NG/ML (ref 13–150)
FLUAV SUBTYP SPEC NAA+PROBE: NOT DETECTED
FLUBV RNA ISLT QL NAA+PROBE: NOT DETECTED
GAS FLOW AIRWAY: 15 LPM
GFR SERPL CREATININE-BSD FRML MDRD: 12 ML/MIN/1.73
GFR SERPL CREATININE-BSD FRML MDRD: 15 ML/MIN/1.73
GFR SERPL CREATININE-BSD FRML MDRD: 15 ML/MIN/1.73
GFR SERPL CREATININE-BSD FRML MDRD: 16 ML/MIN/1.73
GFR SERPL CREATININE-BSD FRML MDRD: 17 ML/MIN/1.73
GFR SERPL CREATININE-BSD FRML MDRD: 22 ML/MIN/1.73
GFR SERPL CREATININE-BSD FRML MDRD: 8 ML/MIN/1.73
GFR SERPL CREATININE-BSD FRML MDRD: 9 ML/MIN/1.73
GFR SERPL CREATININE-BSD FRML MDRD: ABNORMAL ML/MIN/{1.73_M2}
GLOBULIN UR ELPH-MCNC: 4.5 GM/DL
GLOBULIN UR ELPH-MCNC: 5 GM/DL
GLOBULIN UR ELPH-MCNC: 5.1 GM/DL
GLOBULIN UR ELPH-MCNC: 5.1 GM/DL
GLOBULIN UR ELPH-MCNC: 5.3 GM/DL
GLOBULIN UR ELPH-MCNC: 5.4 GM/DL
GLOBULIN UR ELPH-MCNC: 5.5 GM/DL
GLUCOSE BLDC GLUCOMTR-MCNC: 124 MG/DL (ref 70–130)
GLUCOSE BLDC GLUCOMTR-MCNC: 128 MG/DL (ref 70–130)
GLUCOSE BLDC GLUCOMTR-MCNC: 141 MG/DL (ref 70–130)
GLUCOSE BLDC GLUCOMTR-MCNC: 145 MG/DL (ref 70–130)
GLUCOSE BLDC GLUCOMTR-MCNC: 147 MG/DL (ref 70–130)
GLUCOSE BLDC GLUCOMTR-MCNC: 152 MG/DL (ref 70–130)
GLUCOSE BLDC GLUCOMTR-MCNC: 157 MG/DL (ref 70–130)
GLUCOSE BLDC GLUCOMTR-MCNC: 157 MG/DL (ref 70–130)
GLUCOSE BLDC GLUCOMTR-MCNC: 163 MG/DL (ref 70–130)
GLUCOSE BLDC GLUCOMTR-MCNC: 168 MG/DL (ref 70–130)
GLUCOSE BLDC GLUCOMTR-MCNC: 168 MG/DL (ref 70–130)
GLUCOSE BLDC GLUCOMTR-MCNC: 171 MG/DL (ref 70–130)
GLUCOSE BLDC GLUCOMTR-MCNC: 172 MG/DL (ref 70–130)
GLUCOSE BLDC GLUCOMTR-MCNC: 173 MG/DL (ref 70–130)
GLUCOSE BLDC GLUCOMTR-MCNC: 177 MG/DL (ref 70–130)
GLUCOSE BLDC GLUCOMTR-MCNC: 197 MG/DL (ref 70–130)
GLUCOSE BLDC GLUCOMTR-MCNC: 204 MG/DL (ref 70–130)
GLUCOSE BLDC GLUCOMTR-MCNC: 210 MG/DL (ref 70–130)
GLUCOSE BLDC GLUCOMTR-MCNC: 210 MG/DL (ref 70–130)
GLUCOSE BLDC GLUCOMTR-MCNC: 212 MG/DL (ref 70–130)
GLUCOSE BLDC GLUCOMTR-MCNC: 213 MG/DL (ref 70–130)
GLUCOSE BLDC GLUCOMTR-MCNC: 216 MG/DL (ref 70–130)
GLUCOSE BLDC GLUCOMTR-MCNC: 217 MG/DL (ref 70–130)
GLUCOSE BLDC GLUCOMTR-MCNC: 236 MG/DL (ref 70–130)
GLUCOSE BLDC GLUCOMTR-MCNC: 242 MG/DL (ref 70–130)
GLUCOSE BLDC GLUCOMTR-MCNC: 245 MG/DL (ref 70–130)
GLUCOSE BLDC GLUCOMTR-MCNC: 251 MG/DL (ref 70–130)
GLUCOSE BLDC GLUCOMTR-MCNC: 256 MG/DL (ref 70–130)
GLUCOSE BLDC GLUCOMTR-MCNC: 257 MG/DL (ref 70–130)
GLUCOSE BLDC GLUCOMTR-MCNC: 281 MG/DL (ref 70–130)
GLUCOSE BLDC GLUCOMTR-MCNC: 321 MG/DL (ref 70–130)
GLUCOSE BLDC GLUCOMTR-MCNC: 381 MG/DL (ref 70–130)
GLUCOSE BLDC GLUCOMTR-MCNC: 384 MG/DL (ref 70–130)
GLUCOSE BLDC GLUCOMTR-MCNC: 392 MG/DL (ref 70–130)
GLUCOSE SERPL-MCNC: 124 MG/DL (ref 65–99)
GLUCOSE SERPL-MCNC: 156 MG/DL (ref 65–99)
GLUCOSE SERPL-MCNC: 165 MG/DL (ref 65–99)
GLUCOSE SERPL-MCNC: 179 MG/DL (ref 65–99)
GLUCOSE SERPL-MCNC: 188 MG/DL (ref 65–99)
GLUCOSE SERPL-MCNC: 197 MG/DL (ref 65–99)
GLUCOSE SERPL-MCNC: 200 MG/DL (ref 65–99)
GLUCOSE SERPL-MCNC: 208 MG/DL (ref 65–99)
GLUCOSE SERPL-MCNC: 238 MG/DL (ref 65–99)
GLUCOSE SERPL-MCNC: 243 MG/DL (ref 65–99)
GRAM STN SPEC: ABNORMAL
HADV DNA SPEC NAA+PROBE: NOT DETECTED
HBA1C MFR BLD: 5.7 % (ref 4.8–5.6)
HBV SURFACE AB SER RIA-ACNC: REACTIVE
HBV SURFACE AG SERPL QL IA: NORMAL
HCO3 BLDA-SCNC: 26.4 MMOL/L (ref 22–28)
HCOV 229E RNA SPEC QL NAA+PROBE: NOT DETECTED
HCOV HKU1 RNA SPEC QL NAA+PROBE: NOT DETECTED
HCOV NL63 RNA SPEC QL NAA+PROBE: NOT DETECTED
HCOV OC43 RNA SPEC QL NAA+PROBE: NOT DETECTED
HCT VFR BLD AUTO: 25.7 % (ref 34–46.6)
HCT VFR BLD AUTO: 26.4 % (ref 34–46.6)
HCT VFR BLD AUTO: 27.3 % (ref 34–46.6)
HCT VFR BLD AUTO: 27.9 % (ref 34–46.6)
HCT VFR BLD AUTO: 29 % (ref 34–46.6)
HCT VFR BLD AUTO: 30 % (ref 34–46.6)
HCT VFR BLD AUTO: 30.5 % (ref 34–46.6)
HCT VFR BLD AUTO: 32 % (ref 34–46.6)
HCT VFR BLD AUTO: 34.7 % (ref 34–46.6)
HGB BLD-MCNC: 10 G/DL (ref 12–15.9)
HGB BLD-MCNC: 10.3 G/DL (ref 12–15.9)
HGB BLD-MCNC: 11.4 G/DL (ref 12–15.9)
HGB BLD-MCNC: 8.7 G/DL (ref 12–15.9)
HGB BLD-MCNC: 9.1 G/DL (ref 12–15.9)
HGB BLD-MCNC: 9.1 G/DL (ref 12–15.9)
HGB BLD-MCNC: 9.5 G/DL (ref 12–15.9)
HGB BLD-MCNC: 9.9 G/DL (ref 12–15.9)
HGB BLD-MCNC: 9.9 G/DL (ref 12–15.9)
HMPV RNA NPH QL NAA+NON-PROBE: NOT DETECTED
HPIV1 RNA ISLT QL NAA+PROBE: NOT DETECTED
HPIV2 RNA SPEC QL NAA+PROBE: NOT DETECTED
HPIV3 RNA NPH QL NAA+PROBE: NOT DETECTED
HPIV4 P GENE NPH QL NAA+PROBE: NOT DETECTED
IMM GRANULOCYTES # BLD AUTO: 0.02 10*3/MM3 (ref 0–0.05)
IMM GRANULOCYTES NFR BLD AUTO: 0.3 % (ref 0–0.5)
IRON 24H UR-MRATE: 43 MCG/DL (ref 37–145)
IRON SATN MFR SERPL: 49 % (ref 20–50)
ISOLATED FROM: ABNORMAL
LDH SERPL-CCNC: 243 U/L (ref 135–214)
LDH SERPL-CCNC: 297 U/L (ref 135–214)
LDH SERPL-CCNC: 327 U/L (ref 135–214)
LDH SERPL-CCNC: 339 U/L (ref 135–214)
LDH SERPL-CCNC: 375 U/L (ref 135–214)
LDH SERPL-CCNC: 425 U/L (ref 135–214)
LIPASE SERPL-CCNC: 29 U/L (ref 13–60)
LYMPHOCYTES # BLD AUTO: 0.67 10*3/MM3 (ref 0.7–3.1)
LYMPHOCYTES # BLD AUTO: 0.71 10*3/MM3 (ref 0.7–3.1)
LYMPHOCYTES # BLD AUTO: 1.64 10*3/MM3 (ref 0.7–3.1)
LYMPHOCYTES # BLD MANUAL: 0.68 10*3/MM3 (ref 0.7–3.1)
LYMPHOCYTES # BLD MANUAL: 0.73 10*3/MM3 (ref 0.7–3.1)
LYMPHOCYTES # BLD MANUAL: 0.78 10*3/MM3 (ref 0.7–3.1)
LYMPHOCYTES # BLD MANUAL: 0.83 10*3/MM3 (ref 0.7–3.1)
LYMPHOCYTES NFR BLD AUTO: 22 % (ref 19.6–45.3)
LYMPHOCYTES NFR BLD AUTO: 8.1 % (ref 19.6–45.3)
LYMPHOCYTES NFR BLD AUTO: 8.4 % (ref 19.6–45.3)
LYMPHOCYTES NFR BLD MANUAL: 3 % (ref 5–12)
LYMPHOCYTES NFR BLD MANUAL: 4 % (ref 5–12)
LYMPHOCYTES NFR BLD MANUAL: 7.3 % (ref 5–12)
LYMPHOCYTES NFR BLD MANUAL: 8.1 % (ref 5–12)
M PNEUMO IGG SER IA-ACNC: NOT DETECTED
MACROCYTES BLD QL SMEAR: ABNORMAL
MAGNESIUM SERPL-MCNC: 2.2 MG/DL (ref 1.6–2.4)
MCH RBC QN AUTO: 29.2 PG (ref 26.6–33)
MCH RBC QN AUTO: 29.7 PG (ref 26.6–33)
MCH RBC QN AUTO: 29.9 PG (ref 26.6–33)
MCH RBC QN AUTO: 30.3 PG (ref 26.6–33)
MCH RBC QN AUTO: 30.4 PG (ref 26.6–33)
MCH RBC QN AUTO: 30.5 PG (ref 26.6–33)
MCH RBC QN AUTO: 30.5 PG (ref 26.6–33)
MCH RBC QN AUTO: 30.6 PG (ref 26.6–33)
MCH RBC QN AUTO: 30.6 PG (ref 26.6–33)
MCHC RBC AUTO-ENTMCNC: 32.2 G/DL (ref 31.5–35.7)
MCHC RBC AUTO-ENTMCNC: 32.8 G/DL (ref 31.5–35.7)
MCHC RBC AUTO-ENTMCNC: 32.9 G/DL (ref 31.5–35.7)
MCHC RBC AUTO-ENTMCNC: 33 G/DL (ref 31.5–35.7)
MCHC RBC AUTO-ENTMCNC: 33.3 G/DL (ref 31.5–35.7)
MCHC RBC AUTO-ENTMCNC: 33.9 G/DL (ref 31.5–35.7)
MCHC RBC AUTO-ENTMCNC: 34.1 G/DL (ref 31.5–35.7)
MCHC RBC AUTO-ENTMCNC: 34.1 G/DL (ref 31.5–35.7)
MCHC RBC AUTO-ENTMCNC: 34.5 G/DL (ref 31.5–35.7)
MCV RBC AUTO: 87.2 FL (ref 79–97)
MCV RBC AUTO: 88.7 FL (ref 79–97)
MCV RBC AUTO: 88.9 FL (ref 79–97)
MCV RBC AUTO: 89 FL (ref 79–97)
MCV RBC AUTO: 89.8 FL (ref 79–97)
MCV RBC AUTO: 90.5 FL (ref 79–97)
MCV RBC AUTO: 92.3 FL (ref 79–97)
MCV RBC AUTO: 92.4 FL (ref 79–97)
MCV RBC AUTO: 94.7 FL (ref 79–97)
MODALITY: ABNORMAL
MONOCYTES # BLD AUTO: 0.44 10*3/MM3 (ref 0.1–0.9)
MONOCYTES # BLD AUTO: 0.6 10*3/MM3 (ref 0.1–0.9)
MONOCYTES # BLD AUTO: 0.6 10*3/MM3 (ref 0.1–0.9)
MONOCYTES # BLD: 0.18 10*3/MM3 (ref 0.1–0.9)
MONOCYTES # BLD: 0.53 10*3/MM3 (ref 0.1–0.9)
MONOCYTES # BLD: 0.61 10*3/MM3 (ref 0.1–0.9)
MONOCYTES # BLD: 0.96 10*3/MM3 (ref 0.1–0.9)
MONOCYTES NFR BLD AUTO: 5.5 % (ref 5–12)
MONOCYTES NFR BLD AUTO: 6.9 % (ref 5–12)
MONOCYTES NFR BLD AUTO: 8 % (ref 5–12)
NEUTROPHILS # BLD AUTO: 11.38 10*3/MM3 (ref 1.7–7)
NEUTROPHILS # BLD AUTO: 13.93 10*3/MM3 (ref 1.7–7)
NEUTROPHILS # BLD AUTO: 5.24 10*3/MM3 (ref 1.7–7)
NEUTROPHILS # BLD AUTO: 5.29 10*3/MM3 (ref 1.7–7)
NEUTROPHILS NFR BLD AUTO: 5.1 10*3/MM3 (ref 1.7–7)
NEUTROPHILS NFR BLD AUTO: 6.68 10*3/MM3 (ref 1.7–7)
NEUTROPHILS NFR BLD AUTO: 68.4 % (ref 42.7–76)
NEUTROPHILS NFR BLD AUTO: 7.28 10*3/MM3 (ref 1.7–7)
NEUTROPHILS NFR BLD AUTO: 83.4 % (ref 42.7–76)
NEUTROPHILS NFR BLD AUTO: 83.6 % (ref 42.7–76)
NEUTROPHILS NFR BLD MANUAL: 80.8 % (ref 42.7–76)
NEUTROPHILS NFR BLD MANUAL: 85.9 % (ref 42.7–76)
NEUTROPHILS NFR BLD MANUAL: 86.5 % (ref 42.7–76)
NEUTROPHILS NFR BLD MANUAL: 90.9 % (ref 42.7–76)
NRBC BLD AUTO-RTO: 0 /100 WBC (ref 0–0.2)
NRBC BLD AUTO-RTO: 0 /100 WBC (ref 0–0.2)
NRBC SPEC MANUAL: 1 /100 WBC (ref 0–0.2)
NT-PROBNP SERPL-MCNC: 3010 PG/ML (ref 0–900)
NT-PROBNP SERPL-MCNC: ABNORMAL PG/ML (ref 0–900)
OVALOCYTES BLD QL SMEAR: ABNORMAL
PCO2 BLDA: 30.6 MM HG (ref 35–45)
PH BLDA: 7.54 PH UNITS (ref 7.35–7.45)
PHOSPHATE SERPL-MCNC: 2.6 MG/DL (ref 2.5–4.5)
PHOSPHATE SERPL-MCNC: 2.6 MG/DL (ref 2.5–4.5)
PLAT MORPH BLD: NORMAL
PLATELET # BLD AUTO: 215 10*3/MM3 (ref 140–450)
PLATELET # BLD AUTO: 265 10*3/MM3 (ref 140–450)
PLATELET # BLD AUTO: 269 10*3/MM3 (ref 140–450)
PLATELET # BLD AUTO: 291 10*3/MM3 (ref 140–450)
PLATELET # BLD AUTO: 296 10*3/MM3 (ref 140–450)
PLATELET # BLD AUTO: 297 10*3/MM3 (ref 140–450)
PLATELET # BLD AUTO: 303 10*3/MM3 (ref 140–450)
PLATELET # BLD AUTO: 316 10*3/MM3 (ref 140–450)
PLATELET # BLD AUTO: 336 10*3/MM3 (ref 140–450)
PMV BLD AUTO: 10.4 FL (ref 6–12)
PMV BLD AUTO: 10.4 FL (ref 6–12)
PMV BLD AUTO: 10.5 FL (ref 6–12)
PMV BLD AUTO: 9.4 FL (ref 6–12)
PMV BLD AUTO: 9.6 FL (ref 6–12)
PMV BLD AUTO: 9.7 FL (ref 6–12)
PMV BLD AUTO: 9.7 FL (ref 6–12)
PMV BLD AUTO: 9.9 FL (ref 6–12)
PMV BLD AUTO: 9.9 FL (ref 6–12)
PO2 BLDA: 47.4 MM HG (ref 80–100)
POIKILOCYTOSIS BLD QL SMEAR: ABNORMAL
POLYCHROMASIA BLD QL SMEAR: ABNORMAL
POTASSIUM SERPL-SCNC: 3.4 MMOL/L (ref 3.5–5.2)
POTASSIUM SERPL-SCNC: 3.5 MMOL/L (ref 3.5–5.2)
POTASSIUM SERPL-SCNC: 3.8 MMOL/L (ref 3.5–5.2)
POTASSIUM SERPL-SCNC: 4.2 MMOL/L (ref 3.5–5.2)
POTASSIUM SERPL-SCNC: 4.3 MMOL/L (ref 3.5–5.2)
POTASSIUM SERPL-SCNC: 4.4 MMOL/L (ref 3.5–5.2)
POTASSIUM SERPL-SCNC: 4.6 MMOL/L (ref 3.5–5.2)
POTASSIUM SERPL-SCNC: 5 MMOL/L (ref 3.5–5.2)
POTASSIUM SERPL-SCNC: 5 MMOL/L (ref 3.5–5.2)
PROCALCITONIN SERPL-MCNC: 6.38 NG/ML (ref 0–0.25)
PROT SERPL-MCNC: 7.7 G/DL (ref 6–8.5)
PROT SERPL-MCNC: 7.8 G/DL (ref 6–8.5)
PROT SERPL-MCNC: 8 G/DL (ref 6–8.5)
PROT SERPL-MCNC: 8.1 G/DL (ref 6–8.5)
PROT SERPL-MCNC: 8.1 G/DL (ref 6–8.5)
PROT SERPL-MCNC: 8.3 G/DL (ref 6–8.5)
PROT SERPL-MCNC: 8.4 G/DL (ref 6–8.5)
QT INTERVAL: 304 MS
QT INTERVAL: 319 MS
QT INTERVAL: 333 MS
QT INTERVAL: 335 MS
QT INTERVAL: 356 MS
QT INTERVAL: 369 MS
QT INTERVAL: 374 MS
QT INTERVAL: 390 MS
RBC # BLD AUTO: 2.84 10*6/MM3 (ref 3.77–5.28)
RBC # BLD AUTO: 2.97 10*6/MM3 (ref 3.77–5.28)
RBC # BLD AUTO: 3.04 10*6/MM3 (ref 3.77–5.28)
RBC # BLD AUTO: 3.2 10*6/MM3 (ref 3.77–5.28)
RBC # BLD AUTO: 3.25 10*6/MM3 (ref 3.77–5.28)
RBC # BLD AUTO: 3.26 10*6/MM3 (ref 3.77–5.28)
RBC # BLD AUTO: 3.3 10*6/MM3 (ref 3.77–5.28)
RBC # BLD AUTO: 3.38 10*6/MM3 (ref 3.77–5.28)
RBC # BLD AUTO: 3.91 10*6/MM3 (ref 3.77–5.28)
RHINOVIRUS RNA SPEC NAA+PROBE: NOT DETECTED
RSV RNA NPH QL NAA+NON-PROBE: NOT DETECTED
SAO2 % BLDCOA: 88.4 % (ref 92–99)
SARS-COV-2 RNA NPH QL NAA+NON-PROBE: DETECTED
SET MECH RESP RATE: 32
SODIUM SERPL-SCNC: 132 MMOL/L (ref 136–145)
SODIUM SERPL-SCNC: 134 MMOL/L (ref 136–145)
SODIUM SERPL-SCNC: 135 MMOL/L (ref 136–145)
SODIUM SERPL-SCNC: 137 MMOL/L (ref 136–145)
SODIUM SERPL-SCNC: 138 MMOL/L (ref 136–145)
SODIUM SERPL-SCNC: 139 MMOL/L (ref 136–145)
TIBC SERPL-MCNC: 88 MCG/DL (ref 298–536)
TOTAL RATE: 32 BREATHS/MINUTE
TRANSFERRIN SERPL-MCNC: 59 MG/DL (ref 200–360)
TROPONIN T SERPL-MCNC: 0.03 NG/ML (ref 0–0.03)
TROPONIN T SERPL-MCNC: 0.04 NG/ML (ref 0–0.03)
TROPONIN T SERPL-MCNC: 0.46 NG/ML (ref 0–0.03)
TROPONIN T SERPL-MCNC: 1.21 NG/ML (ref 0–0.03)
TROPONIN T SERPL-MCNC: 1.84 NG/ML (ref 0–0.03)
TROPONIN T SERPL-MCNC: 2.02 NG/ML (ref 0–0.03)
TROPONIN T SERPL-MCNC: 3.07 NG/ML (ref 0–0.03)
TROPONIN T SERPL-MCNC: 3.24 NG/ML (ref 0–0.03)
TROPONIN T SERPL-MCNC: 3.39 NG/ML (ref 0–0.03)
TROPONIN T SERPL-MCNC: 3.51 NG/ML (ref 0–0.03)
TROPONIN T SERPL-MCNC: 3.81 NG/ML (ref 0–0.03)
VARIANT LYMPHS NFR BLD MANUAL: 1 % (ref 0–5)
VARIANT LYMPHS NFR BLD MANUAL: 10.1 % (ref 19.6–45.3)
VARIANT LYMPHS NFR BLD MANUAL: 11.1 % (ref 19.6–45.3)
VARIANT LYMPHS NFR BLD MANUAL: 5.1 % (ref 19.6–45.3)
VARIANT LYMPHS NFR BLD MANUAL: 6.3 % (ref 19.6–45.3)
WBC # BLD AUTO: 7.46 10*3/MM3 (ref 3.4–10.8)
WBC MORPH BLD: NORMAL
WBC NRBC COR # BLD: 12.97 10*3/MM3 (ref 3.4–10.8)
WBC NRBC COR # BLD: 13.16 10*3/MM3 (ref 3.4–10.8)
WBC NRBC COR # BLD: 15.33 10*3/MM3 (ref 3.4–10.8)
WBC NRBC COR # BLD: 15.5 10*3/MM3 (ref 3.4–10.8)
WBC NRBC COR # BLD: 6.1 10*3/MM3 (ref 3.4–10.8)
WBC NRBC COR # BLD: 6.55 10*3/MM3 (ref 3.4–10.8)
WBC NRBC COR # BLD: 7.99 10*3/MM3 (ref 3.4–10.8)
WBC NRBC COR # BLD: 8.73 10*3/MM3 (ref 3.4–10.8)

## 2021-01-01 PROCEDURE — 80053 COMPREHEN METABOLIC PANEL: CPT | Performed by: INTERNAL MEDICINE

## 2021-01-01 PROCEDURE — 99284 EMERGENCY DEPT VISIT MOD MDM: CPT

## 2021-01-01 PROCEDURE — 25010000002 LEVETIRACETAM IN NACL 0.82% 500 MG/100ML SOLUTION: Performed by: INTERNAL MEDICINE

## 2021-01-01 PROCEDURE — 25010000002 MORPHINE PER 10 MG: Performed by: INTERNAL MEDICINE

## 2021-01-01 PROCEDURE — 63710000001 INSULIN LISPRO (HUMAN) PER 5 UNITS: Performed by: INTERNAL MEDICINE

## 2021-01-01 PROCEDURE — 84100 ASSAY OF PHOSPHORUS: CPT | Performed by: HOSPITALIST

## 2021-01-01 PROCEDURE — 80053 COMPREHEN METABOLIC PANEL: CPT | Performed by: PHYSICIAN ASSISTANT

## 2021-01-01 PROCEDURE — 86706 HEP B SURFACE ANTIBODY: CPT | Performed by: HOSPITALIST

## 2021-01-01 PROCEDURE — 71045 X-RAY EXAM CHEST 1 VIEW: CPT

## 2021-01-01 PROCEDURE — 25010000002 HEPARIN (PORCINE) PER 1000 UNITS: Performed by: INTERNAL MEDICINE

## 2021-01-01 PROCEDURE — 84484 ASSAY OF TROPONIN QUANT: CPT | Performed by: NURSE PRACTITIONER

## 2021-01-01 PROCEDURE — 83615 LACTATE (LD) (LDH) ENZYME: CPT | Performed by: INTERNAL MEDICINE

## 2021-01-01 PROCEDURE — 63710000001 INSULIN GLARGINE PER 5 UNITS: Performed by: INTERNAL MEDICINE

## 2021-01-01 PROCEDURE — 85025 COMPLETE CBC W/AUTO DIFF WBC: CPT | Performed by: INTERNAL MEDICINE

## 2021-01-01 PROCEDURE — 36415 COLL VENOUS BLD VENIPUNCTURE: CPT | Performed by: INTERNAL MEDICINE

## 2021-01-01 PROCEDURE — 85379 FIBRIN DEGRADATION QUANT: CPT | Performed by: INTERNAL MEDICINE

## 2021-01-01 PROCEDURE — 84484 ASSAY OF TROPONIN QUANT: CPT

## 2021-01-01 PROCEDURE — 93005 ELECTROCARDIOGRAM TRACING: CPT

## 2021-01-01 PROCEDURE — 94760 N-INVAS EAR/PLS OXIMETRY 1: CPT

## 2021-01-01 PROCEDURE — 82962 GLUCOSE BLOOD TEST: CPT

## 2021-01-01 PROCEDURE — 85027 COMPLETE CBC AUTOMATED: CPT | Performed by: INTERNAL MEDICINE

## 2021-01-01 PROCEDURE — 83540 ASSAY OF IRON: CPT | Performed by: HOSPITALIST

## 2021-01-01 PROCEDURE — 93005 ELECTROCARDIOGRAM TRACING: CPT | Performed by: NURSE PRACTITIONER

## 2021-01-01 PROCEDURE — 83690 ASSAY OF LIPASE: CPT | Performed by: EMERGENCY MEDICINE

## 2021-01-01 PROCEDURE — 25010000002 DEXAMETHASONE PER 1 MG: Performed by: INTERNAL MEDICINE

## 2021-01-01 PROCEDURE — P9047 ALBUMIN (HUMAN), 25%, 50ML: HCPCS | Performed by: HOSPITALIST

## 2021-01-01 PROCEDURE — 82728 ASSAY OF FERRITIN: CPT | Performed by: INTERNAL MEDICINE

## 2021-01-01 PROCEDURE — 99232 SBSQ HOSP IP/OBS MODERATE 35: CPT | Performed by: INTERNAL MEDICINE

## 2021-01-01 PROCEDURE — P9047 ALBUMIN (HUMAN), 25%, 50ML: HCPCS | Performed by: INTERNAL MEDICINE

## 2021-01-01 PROCEDURE — 84466 ASSAY OF TRANSFERRIN: CPT | Performed by: HOSPITALIST

## 2021-01-01 PROCEDURE — 25010000002 ADENOSINE PER 6 MG: Performed by: EMERGENCY MEDICINE

## 2021-01-01 PROCEDURE — 92610 EVALUATE SWALLOWING FUNCTION: CPT

## 2021-01-01 PROCEDURE — 94761 N-INVAS EAR/PLS OXIMETRY MLT: CPT

## 2021-01-01 PROCEDURE — 0202U NFCT DS 22 TRGT SARS-COV-2: CPT | Performed by: EMERGENCY MEDICINE

## 2021-01-01 PROCEDURE — 83036 HEMOGLOBIN GLYCOSYLATED A1C: CPT | Performed by: INTERNAL MEDICINE

## 2021-01-01 PROCEDURE — 93010 ELECTROCARDIOGRAM REPORT: CPT | Performed by: INTERNAL MEDICINE

## 2021-01-01 PROCEDURE — 99232 SBSQ HOSP IP/OBS MODERATE 35: CPT | Performed by: NURSE PRACTITIONER

## 2021-01-01 PROCEDURE — 25010000002 DEXAMETHASONE PER 1 MG: Performed by: EMERGENCY MEDICINE

## 2021-01-01 PROCEDURE — 83880 ASSAY OF NATRIURETIC PEPTIDE: CPT | Performed by: PHYSICIAN ASSISTANT

## 2021-01-01 PROCEDURE — 84484 ASSAY OF TROPONIN QUANT: CPT | Performed by: INTERNAL MEDICINE

## 2021-01-01 PROCEDURE — 93005 ELECTROCARDIOGRAM TRACING: CPT | Performed by: EMERGENCY MEDICINE

## 2021-01-01 PROCEDURE — 36415 COLL VENOUS BLD VENIPUNCTURE: CPT | Performed by: PHYSICIAN ASSISTANT

## 2021-01-01 PROCEDURE — 84484 ASSAY OF TROPONIN QUANT: CPT | Performed by: PHYSICIAN ASSISTANT

## 2021-01-01 PROCEDURE — 82248 BILIRUBIN DIRECT: CPT | Performed by: INTERNAL MEDICINE

## 2021-01-01 PROCEDURE — 93005 ELECTROCARDIOGRAM TRACING: CPT | Performed by: INTERNAL MEDICINE

## 2021-01-01 PROCEDURE — 94799 UNLISTED PULMONARY SVC/PX: CPT

## 2021-01-01 PROCEDURE — 93306 TTE W/DOPPLER COMPLETE: CPT | Performed by: INTERNAL MEDICINE

## 2021-01-01 PROCEDURE — 25010000002 PERFLUTREN (DEFINITY) 8.476 MG IN SODIUM CHLORIDE (PF) 0.9 % 10 ML INJECTION: Performed by: INTERNAL MEDICINE

## 2021-01-01 PROCEDURE — 93306 TTE W/DOPPLER COMPLETE: CPT

## 2021-01-01 PROCEDURE — 83880 ASSAY OF NATRIURETIC PEPTIDE: CPT | Performed by: EMERGENCY MEDICINE

## 2021-01-01 PROCEDURE — 25010000002 LORAZEPAM PER 2 MG: Performed by: INTERNAL MEDICINE

## 2021-01-01 PROCEDURE — 84484 ASSAY OF TROPONIN QUANT: CPT | Performed by: EMERGENCY MEDICINE

## 2021-01-01 PROCEDURE — 83735 ASSAY OF MAGNESIUM: CPT | Performed by: NURSE PRACTITIONER

## 2021-01-01 PROCEDURE — 94762 N-INVAS EAR/PLS OXIMTRY CONT: CPT

## 2021-01-01 PROCEDURE — 5A1D70Z PERFORMANCE OF URINARY FILTRATION, INTERMITTENT, LESS THAN 6 HOURS PER DAY: ICD-10-PCS | Performed by: HOSPITALIST

## 2021-01-01 PROCEDURE — 85025 COMPLETE CBC W/AUTO DIFF WBC: CPT | Performed by: PHYSICIAN ASSISTANT

## 2021-01-01 PROCEDURE — 85007 BL SMEAR W/DIFF WBC COUNT: CPT | Performed by: INTERNAL MEDICINE

## 2021-01-01 PROCEDURE — 82565 ASSAY OF CREATININE: CPT | Performed by: INTERNAL MEDICINE

## 2021-01-01 PROCEDURE — 84145 PROCALCITONIN (PCT): CPT | Performed by: EMERGENCY MEDICINE

## 2021-01-01 PROCEDURE — 99221 1ST HOSP IP/OBS SF/LOW 40: CPT | Performed by: SURGERY

## 2021-01-01 PROCEDURE — 99231 SBSQ HOSP IP/OBS SF/LOW 25: CPT | Performed by: SURGERY

## 2021-01-01 PROCEDURE — 87185 SC STD ENZYME DETCJ PER NZM: CPT | Performed by: EMERGENCY MEDICINE

## 2021-01-01 PROCEDURE — 83605 ASSAY OF LACTIC ACID: CPT | Performed by: EMERGENCY MEDICINE

## 2021-01-01 PROCEDURE — 85007 BL SMEAR W/DIFF WBC COUNT: CPT | Performed by: EMERGENCY MEDICINE

## 2021-01-01 PROCEDURE — 36415 COLL VENOUS BLD VENIPUNCTURE: CPT

## 2021-01-01 PROCEDURE — 25010000002 ALBUMIN HUMAN 25% PER 50 ML: Performed by: HOSPITALIST

## 2021-01-01 PROCEDURE — 87076 CULTURE ANAEROBE IDENT EACH: CPT | Performed by: EMERGENCY MEDICINE

## 2021-01-01 PROCEDURE — 82803 BLOOD GASES ANY COMBINATION: CPT

## 2021-01-01 PROCEDURE — 87340 HEPATITIS B SURFACE AG IA: CPT | Performed by: HOSPITALIST

## 2021-01-01 PROCEDURE — 36600 WITHDRAWAL OF ARTERIAL BLOOD: CPT

## 2021-01-01 PROCEDURE — 25010000002 ALBUMIN HUMAN 25% PER 50 ML: Performed by: INTERNAL MEDICINE

## 2021-01-01 PROCEDURE — 99232 SBSQ HOSP IP/OBS MODERATE 35: CPT

## 2021-01-01 PROCEDURE — 87040 BLOOD CULTURE FOR BACTERIA: CPT | Performed by: EMERGENCY MEDICINE

## 2021-01-01 PROCEDURE — 86140 C-REACTIVE PROTEIN: CPT | Performed by: EMERGENCY MEDICINE

## 2021-01-01 PROCEDURE — XW033E5 INTRODUCTION OF REMDESIVIR ANTI-INFECTIVE INTO PERIPHERAL VEIN, PERCUTANEOUS APPROACH, NEW TECHNOLOGY GROUP 5: ICD-10-PCS | Performed by: INTERNAL MEDICINE

## 2021-01-01 PROCEDURE — 80048 BASIC METABOLIC PNL TOTAL CA: CPT | Performed by: INTERNAL MEDICINE

## 2021-01-01 PROCEDURE — 85025 COMPLETE CBC W/AUTO DIFF WBC: CPT | Performed by: EMERGENCY MEDICINE

## 2021-01-01 PROCEDURE — 84132 ASSAY OF SERUM POTASSIUM: CPT | Performed by: NURSE PRACTITIONER

## 2021-01-01 PROCEDURE — 80053 COMPREHEN METABOLIC PANEL: CPT | Performed by: EMERGENCY MEDICINE

## 2021-01-01 RX ORDER — MORPHINE SULFATE 2 MG/ML
6 INJECTION, SOLUTION INTRAMUSCULAR; INTRAVENOUS
Status: DISCONTINUED | OUTPATIENT
Start: 2021-01-01 | End: 2021-12-21 | Stop reason: HOSPADM

## 2021-01-01 RX ORDER — ACETAMINOPHEN 500 MG
500 TABLET ORAL EVERY 6 HOURS PRN
Status: DISCONTINUED | OUTPATIENT
Start: 2021-01-01 | End: 2021-12-21 | Stop reason: HOSPADM

## 2021-01-01 RX ORDER — MORPHINE SULFATE 2 MG/ML
2 INJECTION, SOLUTION INTRAMUSCULAR; INTRAVENOUS ONCE
Status: COMPLETED | OUTPATIENT
Start: 2021-01-01 | End: 2021-01-01

## 2021-01-01 RX ORDER — DIPHENHYDRAMINE HYDROCHLORIDE 50 MG/ML
25 INJECTION INTRAMUSCULAR; INTRAVENOUS EVERY 6 HOURS PRN
Status: DISCONTINUED | OUTPATIENT
Start: 2021-01-01 | End: 2021-12-21 | Stop reason: HOSPADM

## 2021-01-01 RX ORDER — ONDANSETRON 2 MG/ML
4 INJECTION INTRAMUSCULAR; INTRAVENOUS EVERY 6 HOURS PRN
Status: DISCONTINUED | OUTPATIENT
Start: 2021-01-01 | End: 2021-12-21 | Stop reason: HOSPADM

## 2021-01-01 RX ORDER — LORAZEPAM 2 MG/ML
0.5 INJECTION INTRAMUSCULAR
Status: DISCONTINUED | OUTPATIENT
Start: 2021-01-01 | End: 2021-12-21 | Stop reason: HOSPADM

## 2021-01-01 RX ORDER — LORAZEPAM 2 MG/ML
2 INJECTION INTRAMUSCULAR
Status: DISCONTINUED | OUTPATIENT
Start: 2021-01-01 | End: 2021-12-21 | Stop reason: HOSPADM

## 2021-01-01 RX ORDER — GLYCOPYRROLATE 0.2 MG/ML
0.4 INJECTION INTRAMUSCULAR; INTRAVENOUS
Status: DISCONTINUED | OUTPATIENT
Start: 2021-01-01 | End: 2021-12-21 | Stop reason: HOSPADM

## 2021-01-01 RX ORDER — CARVEDILOL 12.5 MG/1
12.5 TABLET ORAL 2 TIMES DAILY WITH MEALS
Status: DISCONTINUED | OUTPATIENT
Start: 2021-01-01 | End: 2021-01-01

## 2021-01-01 RX ORDER — KETOROLAC TROMETHAMINE 30 MG/ML
15 INJECTION, SOLUTION INTRAMUSCULAR; INTRAVENOUS EVERY 6 HOURS PRN
Status: DISCONTINUED | OUTPATIENT
Start: 2021-01-01 | End: 2021-12-21 | Stop reason: HOSPADM

## 2021-01-01 RX ORDER — CEFAZOLIN SODIUM 2 G/100ML
2 INJECTION, SOLUTION INTRAVENOUS ONCE
Status: CANCELLED | OUTPATIENT
Start: 2021-01-01 | End: 2021-01-01

## 2021-01-01 RX ORDER — ATORVASTATIN CALCIUM 20 MG/1
60 TABLET, FILM COATED ORAL NIGHTLY
Status: DISCONTINUED | OUTPATIENT
Start: 2021-01-01 | End: 2021-01-01

## 2021-01-01 RX ORDER — PROMETHAZINE HYDROCHLORIDE 25 MG/1
12.5 TABLET ORAL EVERY 4 HOURS PRN
Status: DISCONTINUED | OUTPATIENT
Start: 2021-01-01 | End: 2021-12-21 | Stop reason: HOSPADM

## 2021-01-01 RX ORDER — ACETAMINOPHEN 325 MG/1
650 TABLET ORAL EVERY 4 HOURS PRN
Status: DISCONTINUED | OUTPATIENT
Start: 2021-01-01 | End: 2021-12-21 | Stop reason: HOSPADM

## 2021-01-01 RX ORDER — HEPARIN SODIUM 5000 [USP'U]/ML
5000 INJECTION, SOLUTION INTRAVENOUS; SUBCUTANEOUS EVERY 12 HOURS SCHEDULED
Status: DISCONTINUED | OUTPATIENT
Start: 2021-01-01 | End: 2021-01-01

## 2021-01-01 RX ORDER — POTASSIUM CHLORIDE 750 MG/1
20 TABLET, FILM COATED, EXTENDED RELEASE ORAL ONCE
Status: COMPLETED | OUTPATIENT
Start: 2021-01-01 | End: 2021-01-01

## 2021-01-01 RX ORDER — LIFITEGRAST 50 MG/ML
1 SOLUTION/ DROPS OPHTHALMIC 2 TIMES DAILY
COMMUNITY

## 2021-01-01 RX ORDER — LORAZEPAM 2 MG/ML
1 CONCENTRATE ORAL
Status: DISCONTINUED | OUTPATIENT
Start: 2021-01-01 | End: 2021-12-21 | Stop reason: HOSPADM

## 2021-01-01 RX ORDER — SEVELAMER CARBONATE 800 MG/1
800 TABLET, FILM COATED ORAL
Status: DISCONTINUED | OUTPATIENT
Start: 2021-01-01 | End: 2021-01-01

## 2021-01-01 RX ORDER — ADENOSINE 3 MG/ML
12 INJECTION, SOLUTION INTRAVENOUS ONCE
Status: COMPLETED | OUTPATIENT
Start: 2021-01-01 | End: 2021-01-01

## 2021-01-01 RX ORDER — HYDROCODONE BITARTRATE AND ACETAMINOPHEN 7.5; 325 MG/1; MG/1
1 TABLET ORAL EVERY 6 HOURS PRN
Status: DISCONTINUED | OUTPATIENT
Start: 2021-01-01 | End: 2021-01-01

## 2021-01-01 RX ORDER — MORPHINE SULFATE 20 MG/ML
10 SOLUTION ORAL
Status: DISCONTINUED | OUTPATIENT
Start: 2021-01-01 | End: 2021-12-21 | Stop reason: HOSPADM

## 2021-01-01 RX ORDER — ASPIRIN 81 MG/1
81 TABLET ORAL DAILY
Status: DISCONTINUED | OUTPATIENT
Start: 2021-01-01 | End: 2021-01-01

## 2021-01-01 RX ORDER — FAMOTIDINE 20 MG/1
20 TABLET, FILM COATED ORAL DAILY
Status: DISCONTINUED | OUTPATIENT
Start: 2021-01-01 | End: 2021-01-01

## 2021-01-01 RX ORDER — DEXAMETHASONE SODIUM PHOSPHATE 10 MG/ML
6 INJECTION INTRAMUSCULAR; INTRAVENOUS EVERY 24 HOURS
Status: DISCONTINUED | OUTPATIENT
Start: 2021-01-01 | End: 2021-01-01

## 2021-01-01 RX ORDER — PROMETHAZINE HYDROCHLORIDE 6.25 MG/5ML
12.5 SYRUP ORAL EVERY 4 HOURS PRN
Status: DISCONTINUED | OUTPATIENT
Start: 2021-01-01 | End: 2021-12-21 | Stop reason: HOSPADM

## 2021-01-01 RX ORDER — CARVEDILOL 6.25 MG/1
6.25 TABLET ORAL 2 TIMES DAILY WITH MEALS
Status: DISCONTINUED | OUTPATIENT
Start: 2021-01-01 | End: 2021-01-01

## 2021-01-01 RX ORDER — CARVEDILOL 12.5 MG/1
12.5 TABLET ORAL 2 TIMES DAILY WITH MEALS
Qty: 60 TABLET | Refills: 3 | OUTPATIENT
Start: 2021-01-01

## 2021-01-01 RX ORDER — MORPHINE SULFATE 20 MG/ML
20 SOLUTION ORAL
Status: DISCONTINUED | OUTPATIENT
Start: 2021-01-01 | End: 2021-12-21 | Stop reason: HOSPADM

## 2021-01-01 RX ORDER — LORAZEPAM 2 MG/ML
2 CONCENTRATE ORAL
Status: DISCONTINUED | OUTPATIENT
Start: 2021-01-01 | End: 2021-12-21 | Stop reason: HOSPADM

## 2021-01-01 RX ORDER — LORAZEPAM 2 MG/ML
0.5 CONCENTRATE ORAL
Status: DISCONTINUED | OUTPATIENT
Start: 2021-01-01 | End: 2021-12-21 | Stop reason: HOSPADM

## 2021-01-01 RX ORDER — LEVETIRACETAM 500 MG/1
500 TABLET ORAL 2 TIMES DAILY
Status: DISCONTINUED | OUTPATIENT
Start: 2021-01-01 | End: 2021-01-01

## 2021-01-01 RX ORDER — ALBUMIN (HUMAN) 12.5 G/50ML
25 SOLUTION INTRAVENOUS ONCE
Status: COMPLETED | OUTPATIENT
Start: 2021-01-01 | End: 2021-01-01

## 2021-01-01 RX ORDER — ONDANSETRON 4 MG/1
4 TABLET, FILM COATED ORAL EVERY 6 HOURS PRN
Status: DISCONTINUED | OUTPATIENT
Start: 2021-01-01 | End: 2021-12-21 | Stop reason: HOSPADM

## 2021-01-01 RX ORDER — DEXAMETHASONE 6 MG/1
6 TABLET ORAL
Status: DISCONTINUED | OUTPATIENT
Start: 2021-01-01 | End: 2021-01-01

## 2021-01-01 RX ORDER — DIPHENOXYLATE HYDROCHLORIDE AND ATROPINE SULFATE 2.5; .025 MG/1; MG/1
1 TABLET ORAL
Status: DISCONTINUED | OUTPATIENT
Start: 2021-01-01 | End: 2021-12-21 | Stop reason: HOSPADM

## 2021-01-01 RX ORDER — SODIUM HYPOCHLORITE 1.25 MG/ML
SOLUTION TOPICAL EVERY 12 HOURS SCHEDULED
Status: DISCONTINUED | OUTPATIENT
Start: 2021-01-01 | End: 2021-12-21 | Stop reason: HOSPADM

## 2021-01-01 RX ORDER — MORPHINE SULFATE 2 MG/ML
4 INJECTION, SOLUTION INTRAMUSCULAR; INTRAVENOUS
Status: DISCONTINUED | OUTPATIENT
Start: 2021-01-01 | End: 2021-12-21 | Stop reason: HOSPADM

## 2021-01-01 RX ORDER — PANTOPRAZOLE SODIUM 40 MG/1
40 TABLET, DELAYED RELEASE ORAL DAILY
COMMUNITY

## 2021-01-01 RX ORDER — ACETAMINOPHEN 650 MG/1
650 SUPPOSITORY RECTAL EVERY 4 HOURS PRN
Status: DISCONTINUED | OUTPATIENT
Start: 2021-01-01 | End: 2021-12-21 | Stop reason: HOSPADM

## 2021-01-01 RX ORDER — PROMETHAZINE HYDROCHLORIDE 12.5 MG/1
12.5 SUPPOSITORY RECTAL EVERY 4 HOURS PRN
Status: DISCONTINUED | OUTPATIENT
Start: 2021-01-01 | End: 2021-12-21 | Stop reason: HOSPADM

## 2021-01-01 RX ORDER — NICOTINE POLACRILEX 4 MG
15 LOZENGE BUCCAL
Status: DISCONTINUED | OUTPATIENT
Start: 2021-01-01 | End: 2021-01-01

## 2021-01-01 RX ORDER — HYDROCODONE BITARTRATE AND ACETAMINOPHEN 7.5; 325 MG/1; MG/1
1 TABLET ORAL EVERY 4 HOURS PRN
Status: DISCONTINUED | OUTPATIENT
Start: 2021-01-01 | End: 2021-12-21 | Stop reason: HOSPADM

## 2021-01-01 RX ORDER — SODIUM CHLORIDE 0.9 % (FLUSH) 0.9 %
10 SYRINGE (ML) INJECTION AS NEEDED
Status: DISCONTINUED | OUTPATIENT
Start: 2021-01-01 | End: 2021-01-01 | Stop reason: HOSPADM

## 2021-01-01 RX ORDER — TRAZODONE HYDROCHLORIDE 100 MG/1
100 TABLET ORAL NIGHTLY
COMMUNITY

## 2021-01-01 RX ORDER — GLYCOPYRROLATE 0.2 MG/ML
0.2 INJECTION INTRAMUSCULAR; INTRAVENOUS
Status: DISCONTINUED | OUTPATIENT
Start: 2021-01-01 | End: 2021-12-21 | Stop reason: HOSPADM

## 2021-01-01 RX ORDER — ALBUMIN (HUMAN) 12.5 G/50ML
12.5 SOLUTION INTRAVENOUS AS NEEDED
Status: ACTIVE | OUTPATIENT
Start: 2021-01-01 | End: 2021-01-01

## 2021-01-01 RX ORDER — LORAZEPAM 2 MG/ML
1 INJECTION INTRAMUSCULAR
Status: DISCONTINUED | OUTPATIENT
Start: 2021-01-01 | End: 2021-12-21 | Stop reason: HOSPADM

## 2021-01-01 RX ORDER — LEVETIRACETAM 5 MG/ML
500 INJECTION INTRAVASCULAR EVERY 12 HOURS SCHEDULED
Status: DISCONTINUED | OUTPATIENT
Start: 2021-01-01 | End: 2021-01-01

## 2021-01-01 RX ORDER — INSULIN GLARGINE 100 [IU]/ML
12 INJECTION, SOLUTION SUBCUTANEOUS NIGHTLY
COMMUNITY

## 2021-01-01 RX ORDER — ACETAMINOPHEN 160 MG/5ML
650 SOLUTION ORAL EVERY 4 HOURS PRN
Status: DISCONTINUED | OUTPATIENT
Start: 2021-01-01 | End: 2021-12-21 | Stop reason: HOSPADM

## 2021-01-01 RX ORDER — MORPHINE SULFATE 2 MG/ML
2 INJECTION, SOLUTION INTRAMUSCULAR; INTRAVENOUS
Status: DISCONTINUED | OUTPATIENT
Start: 2021-01-01 | End: 2021-12-21 | Stop reason: HOSPADM

## 2021-01-01 RX ORDER — INSULIN LISPRO 100 [IU]/ML
0-7 INJECTION, SOLUTION INTRAVENOUS; SUBCUTANEOUS
Status: DISCONTINUED | OUTPATIENT
Start: 2021-01-01 | End: 2021-01-01

## 2021-01-01 RX ORDER — ALBUMIN (HUMAN) 12.5 G/50ML
12.5 SOLUTION INTRAVENOUS AS NEEDED
Status: DISPENSED | OUTPATIENT
Start: 2021-01-01 | End: 2021-01-01

## 2021-01-01 RX ORDER — DIPHENHYDRAMINE HCL 25 MG
25 CAPSULE ORAL EVERY 6 HOURS PRN
Status: DISCONTINUED | OUTPATIENT
Start: 2021-01-01 | End: 2021-12-21 | Stop reason: HOSPADM

## 2021-01-01 RX ORDER — MORPHINE SULFATE 20 MG/ML
5 SOLUTION ORAL
Status: DISCONTINUED | OUTPATIENT
Start: 2021-01-01 | End: 2021-12-21 | Stop reason: HOSPADM

## 2021-01-01 RX ORDER — DEXTROSE MONOHYDRATE 25 G/50ML
25 INJECTION, SOLUTION INTRAVENOUS
Status: DISCONTINUED | OUTPATIENT
Start: 2021-01-01 | End: 2021-01-01

## 2021-01-01 RX ORDER — INSULIN GLARGINE 100 [IU]/ML
10 INJECTION, SOLUTION SUBCUTANEOUS NIGHTLY
Status: DISCONTINUED | OUTPATIENT
Start: 2021-01-01 | End: 2021-01-01

## 2021-01-01 RX ORDER — SODIUM CHLORIDE 0.9 % (FLUSH) 0.9 %
10 SYRINGE (ML) INJECTION AS NEEDED
Status: DISCONTINUED | OUTPATIENT
Start: 2021-01-01 | End: 2021-12-21 | Stop reason: HOSPADM

## 2021-01-01 RX ORDER — HYDROCODONE BITARTRATE AND ACETAMINOPHEN 7.5; 325 MG/1; MG/1
1 TABLET ORAL EVERY 6 HOURS PRN
Qty: 120 TABLET | Refills: 0 | OUTPATIENT
Start: 2021-01-01

## 2021-01-01 RX ORDER — DEXAMETHASONE SODIUM PHOSPHATE 10 MG/ML
6 INJECTION INTRAMUSCULAR; INTRAVENOUS ONCE
Status: COMPLETED | OUTPATIENT
Start: 2021-01-01 | End: 2021-01-01

## 2021-01-01 RX ORDER — HYDROMORPHONE HYDROCHLORIDE 1 MG/ML
0.5 INJECTION, SOLUTION INTRAMUSCULAR; INTRAVENOUS; SUBCUTANEOUS
Status: DISCONTINUED | OUTPATIENT
Start: 2021-01-01 | End: 2021-12-21 | Stop reason: HOSPADM

## 2021-01-01 RX ORDER — FAMOTIDINE 10 MG/ML
20 INJECTION, SOLUTION INTRAVENOUS EVERY 12 HOURS SCHEDULED
Status: DISCONTINUED | OUTPATIENT
Start: 2021-01-01 | End: 2021-01-01

## 2021-01-01 RX ADMIN — INSULIN LISPRO 3 UNITS: 100 INJECTION, SOLUTION INTRAVENOUS; SUBCUTANEOUS at 12:21

## 2021-01-01 RX ADMIN — SODIUM HYPOCHLORITE: 1.25 SOLUTION TOPICAL at 10:14

## 2021-01-01 RX ADMIN — INSULIN GLARGINE 10 UNITS: 100 INJECTION, SOLUTION SUBCUTANEOUS at 22:39

## 2021-01-01 RX ADMIN — HEPARIN SODIUM 5000 UNITS: 5000 INJECTION INTRAVENOUS; SUBCUTANEOUS at 21:55

## 2021-01-01 RX ADMIN — LEVETIRACETAM 500 MG: 500 TABLET, FILM COATED ORAL at 20:17

## 2021-01-01 RX ADMIN — MORPHINE SULFATE 2 MG: 2 INJECTION, SOLUTION INTRAMUSCULAR; INTRAVENOUS at 01:54

## 2021-01-01 RX ADMIN — REMDESIVIR 200 MG: 100 INJECTION, POWDER, LYOPHILIZED, FOR SOLUTION INTRAVENOUS at 06:24

## 2021-01-01 RX ADMIN — HEPARIN SODIUM 5000 UNITS: 5000 INJECTION INTRAVENOUS; SUBCUTANEOUS at 20:19

## 2021-01-01 RX ADMIN — INSULIN LISPRO 2 UNITS: 100 INJECTION, SOLUTION INTRAVENOUS; SUBCUTANEOUS at 08:52

## 2021-01-01 RX ADMIN — SODIUM HYPOCHLORITE: 1.25 SOLUTION TOPICAL at 20:48

## 2021-01-01 RX ADMIN — HYDROCODONE BITARTRATE AND ACETAMINOPHEN 1 TABLET: 7.5; 325 TABLET ORAL at 12:21

## 2021-01-01 RX ADMIN — LEVETIRACETAM 500 MG: 500 TABLET, FILM COATED ORAL at 20:47

## 2021-01-01 RX ADMIN — ATORVASTATIN CALCIUM 60 MG: 20 TABLET, FILM COATED ORAL at 21:35

## 2021-01-01 RX ADMIN — INSULIN LISPRO 6 UNITS: 100 INJECTION, SOLUTION INTRAVENOUS; SUBCUTANEOUS at 12:34

## 2021-01-01 RX ADMIN — INSULIN LISPRO 3 UNITS: 100 INJECTION, SOLUTION INTRAVENOUS; SUBCUTANEOUS at 11:18

## 2021-01-01 RX ADMIN — HEPARIN SODIUM 5000 UNITS: 5000 INJECTION INTRAVENOUS; SUBCUTANEOUS at 08:51

## 2021-01-01 RX ADMIN — HYDROCODONE BITARTRATE AND ACETAMINOPHEN 1 TABLET: 7.5; 325 TABLET ORAL at 14:46

## 2021-01-01 RX ADMIN — ASPIRIN 81 MG: 81 TABLET, COATED ORAL at 12:12

## 2021-01-01 RX ADMIN — DEXAMETHASONE SODIUM PHOSPHATE 6 MG: 10 INJECTION INTRAMUSCULAR; INTRAVENOUS at 18:40

## 2021-01-01 RX ADMIN — LEVETIRACETAM 500 MG: 5 INJECTION INTRAVENOUS at 08:31

## 2021-01-01 RX ADMIN — ALBUMIN HUMAN 12.5 G: 0.25 SOLUTION INTRAVENOUS at 01:05

## 2021-01-01 RX ADMIN — HYDROCODONE BITARTRATE AND ACETAMINOPHEN 1 TABLET: 7.5; 325 TABLET ORAL at 22:01

## 2021-01-01 RX ADMIN — INSULIN LISPRO 6 UNITS: 100 INJECTION, SOLUTION INTRAVENOUS; SUBCUTANEOUS at 11:51

## 2021-01-01 RX ADMIN — INSULIN LISPRO 2 UNITS: 100 INJECTION, SOLUTION INTRAVENOUS; SUBCUTANEOUS at 16:49

## 2021-01-01 RX ADMIN — CARVEDILOL 12.5 MG: 12.5 TABLET, FILM COATED ORAL at 17:15

## 2021-01-01 RX ADMIN — CARVEDILOL 12.5 MG: 12.5 TABLET, FILM COATED ORAL at 08:31

## 2021-01-01 RX ADMIN — HYDROCODONE BITARTRATE AND ACETAMINOPHEN 1 TABLET: 7.5; 325 TABLET ORAL at 11:57

## 2021-01-01 RX ADMIN — LORAZEPAM 0.5 MG: 2 INJECTION INTRAMUSCULAR; INTRAVENOUS at 23:10

## 2021-01-01 RX ADMIN — INSULIN LISPRO 2 UNITS: 100 INJECTION, SOLUTION INTRAVENOUS; SUBCUTANEOUS at 17:04

## 2021-01-01 RX ADMIN — CARVEDILOL 6.25 MG: 6.25 TABLET, FILM COATED ORAL at 18:42

## 2021-01-01 RX ADMIN — LEVETIRACETAM 500 MG: 500 TABLET, FILM COATED ORAL at 08:35

## 2021-01-01 RX ADMIN — DEXAMETHASONE 6 MG: 6 TABLET ORAL at 14:10

## 2021-01-01 RX ADMIN — METOPROLOL TARTRATE 2.5 MG: 1 INJECTION, SOLUTION INTRAVENOUS at 20:32

## 2021-01-01 RX ADMIN — SEVELAMER CARBONATE 800 MG: 800 TABLET, FILM COATED ORAL at 09:17

## 2021-01-01 RX ADMIN — INSULIN LISPRO 4 UNITS: 100 INJECTION, SOLUTION INTRAVENOUS; SUBCUTANEOUS at 11:03

## 2021-01-01 RX ADMIN — LEVETIRACETAM 500 MG: 500 TABLET, FILM COATED ORAL at 20:27

## 2021-01-01 RX ADMIN — HYDROCODONE BITARTRATE AND ACETAMINOPHEN 1 TABLET: 7.5; 325 TABLET ORAL at 20:26

## 2021-01-01 RX ADMIN — HYDROCODONE BITARTRATE AND ACETAMINOPHEN 1 TABLET: 7.5; 325 TABLET ORAL at 10:46

## 2021-01-01 RX ADMIN — SEVELAMER CARBONATE 800 MG: 800 TABLET, FILM COATED ORAL at 08:31

## 2021-01-01 RX ADMIN — LEVETIRACETAM 500 MG: 500 TABLET, FILM COATED ORAL at 08:32

## 2021-01-01 RX ADMIN — HEPARIN SODIUM 5000 UNITS: 5000 INJECTION INTRAVENOUS; SUBCUTANEOUS at 21:27

## 2021-01-01 RX ADMIN — FAMOTIDINE 20 MG: 10 INJECTION INTRAVENOUS at 08:08

## 2021-01-01 RX ADMIN — DEXAMETHASONE SODIUM PHOSPHATE 6 MG: 10 INJECTION INTRAMUSCULAR; INTRAVENOUS at 16:53

## 2021-01-01 RX ADMIN — ATORVASTATIN CALCIUM 60 MG: 20 TABLET, FILM COATED ORAL at 20:19

## 2021-01-01 RX ADMIN — LORAZEPAM 0.5 MG: 2 INJECTION INTRAMUSCULAR; INTRAVENOUS at 17:35

## 2021-01-01 RX ADMIN — CARVEDILOL 6.25 MG: 6.25 TABLET, FILM COATED ORAL at 08:52

## 2021-01-01 RX ADMIN — INSULIN LISPRO 3 UNITS: 100 INJECTION, SOLUTION INTRAVENOUS; SUBCUTANEOUS at 08:31

## 2021-01-01 RX ADMIN — INSULIN LISPRO 2 UNITS: 100 INJECTION, SOLUTION INTRAVENOUS; SUBCUTANEOUS at 06:30

## 2021-01-01 RX ADMIN — INSULIN LISPRO 6 UNITS: 100 INJECTION, SOLUTION INTRAVENOUS; SUBCUTANEOUS at 17:34

## 2021-01-01 RX ADMIN — INSULIN GLARGINE 10 UNITS: 100 INJECTION, SOLUTION SUBCUTANEOUS at 22:30

## 2021-01-01 RX ADMIN — INSULIN LISPRO 3 UNITS: 100 INJECTION, SOLUTION INTRAVENOUS; SUBCUTANEOUS at 09:17

## 2021-01-01 RX ADMIN — HYDROCODONE BITARTRATE AND ACETAMINOPHEN 1 TABLET: 7.5; 325 TABLET ORAL at 16:49

## 2021-01-01 RX ADMIN — LEVETIRACETAM 500 MG: 500 TABLET, FILM COATED ORAL at 08:09

## 2021-01-01 RX ADMIN — SODIUM HYPOCHLORITE: 1.25 SOLUTION TOPICAL at 20:20

## 2021-01-01 RX ADMIN — FAMOTIDINE 20 MG: 10 INJECTION INTRAVENOUS at 12:41

## 2021-01-01 RX ADMIN — ATORVASTATIN CALCIUM 60 MG: 20 TABLET, FILM COATED ORAL at 21:55

## 2021-01-01 RX ADMIN — LEVETIRACETAM 500 MG: 5 INJECTION INTRAVENOUS at 12:41

## 2021-01-01 RX ADMIN — LEVETIRACETAM 500 MG: 5 INJECTION INTRAVENOUS at 21:28

## 2021-01-01 RX ADMIN — MORPHINE SULFATE 4 MG: 2 INJECTION, SOLUTION INTRAMUSCULAR; INTRAVENOUS at 22:22

## 2021-01-01 RX ADMIN — SODIUM HYPOCHLORITE 1 BOTTLE: 1.25 SOLUTION TOPICAL at 09:58

## 2021-01-01 RX ADMIN — SODIUM HYPOCHLORITE: 1.25 SOLUTION TOPICAL at 21:56

## 2021-01-01 RX ADMIN — FAMOTIDINE 20 MG: 10 INJECTION INTRAVENOUS at 03:00

## 2021-01-01 RX ADMIN — SODIUM HYPOCHLORITE: 1.25 SOLUTION TOPICAL at 13:38

## 2021-01-01 RX ADMIN — FAMOTIDINE 20 MG: 10 INJECTION INTRAVENOUS at 21:28

## 2021-01-01 RX ADMIN — PERFLUTREN 3 ML: 6.52 INJECTION, SUSPENSION INTRAVENOUS at 14:35

## 2021-01-01 RX ADMIN — DEXAMETHASONE SODIUM PHOSPHATE 6 MG: 10 INJECTION INTRAMUSCULAR; INTRAVENOUS at 18:14

## 2021-01-01 RX ADMIN — MORPHINE SULFATE 4 MG: 2 INJECTION, SOLUTION INTRAMUSCULAR; INTRAVENOUS at 04:01

## 2021-01-01 RX ADMIN — SEVELAMER CARBONATE 800 MG: 800 TABLET, FILM COATED ORAL at 18:42

## 2021-01-01 RX ADMIN — CARVEDILOL 6.25 MG: 6.25 TABLET, FILM COATED ORAL at 08:31

## 2021-01-01 RX ADMIN — ALBUMIN HUMAN 12.5 G: 0.25 SOLUTION INTRAVENOUS at 00:39

## 2021-01-01 RX ADMIN — SODIUM HYPOCHLORITE: 1.25 SOLUTION TOPICAL at 22:22

## 2021-01-01 RX ADMIN — MORPHINE SULFATE 4 MG: 2 INJECTION, SOLUTION INTRAMUSCULAR; INTRAVENOUS at 16:45

## 2021-01-01 RX ADMIN — HYDROCODONE BITARTRATE AND ACETAMINOPHEN 1 TABLET: 7.5; 325 TABLET ORAL at 14:10

## 2021-01-01 RX ADMIN — MORPHINE SULFATE 2 MG: 2 INJECTION, SOLUTION INTRAMUSCULAR; INTRAVENOUS at 20:34

## 2021-01-01 RX ADMIN — HEPARIN SODIUM 5000 UNITS: 5000 INJECTION INTRAVENOUS; SUBCUTANEOUS at 08:31

## 2021-01-01 RX ADMIN — CARVEDILOL 6.25 MG: 6.25 TABLET, FILM COATED ORAL at 02:59

## 2021-01-01 RX ADMIN — CARVEDILOL 12.5 MG: 12.5 TABLET, FILM COATED ORAL at 08:35

## 2021-01-01 RX ADMIN — HYDROCODONE BITARTRATE AND ACETAMINOPHEN 1 TABLET: 7.5; 325 TABLET ORAL at 16:23

## 2021-01-01 RX ADMIN — FAMOTIDINE 20 MG: 20 TABLET, FILM COATED ORAL at 08:35

## 2021-01-01 RX ADMIN — FAMOTIDINE 20 MG: 10 INJECTION INTRAVENOUS at 22:29

## 2021-01-01 RX ADMIN — FAMOTIDINE 20 MG: 20 TABLET, FILM COATED ORAL at 08:32

## 2021-01-01 RX ADMIN — LEVETIRACETAM 500 MG: 500 TABLET, FILM COATED ORAL at 08:27

## 2021-01-01 RX ADMIN — LEVETIRACETAM 500 MG: 500 TABLET, FILM COATED ORAL at 21:55

## 2021-01-01 RX ADMIN — MORPHINE SULFATE 4 MG: 2 INJECTION, SOLUTION INTRAMUSCULAR; INTRAVENOUS at 01:44

## 2021-01-01 RX ADMIN — ATORVASTATIN CALCIUM 60 MG: 20 TABLET, FILM COATED ORAL at 22:29

## 2021-01-01 RX ADMIN — ATORVASTATIN CALCIUM 60 MG: 20 TABLET, FILM COATED ORAL at 22:38

## 2021-01-01 RX ADMIN — HEPARIN SODIUM 5000 UNITS: 5000 INJECTION INTRAVENOUS; SUBCUTANEOUS at 03:00

## 2021-01-01 RX ADMIN — SEVELAMER CARBONATE 800 MG: 800 TABLET, FILM COATED ORAL at 12:21

## 2021-01-01 RX ADMIN — LEVETIRACETAM 500 MG: 500 TABLET, FILM COATED ORAL at 22:39

## 2021-01-01 RX ADMIN — SODIUM HYPOCHLORITE: 1.25 SOLUTION TOPICAL at 08:35

## 2021-01-01 RX ADMIN — HYDROCODONE BITARTRATE AND ACETAMINOPHEN 1 TABLET: 7.5; 325 TABLET ORAL at 18:40

## 2021-01-01 RX ADMIN — SODIUM HYPOCHLORITE: 1.25 SOLUTION TOPICAL at 02:17

## 2021-01-01 RX ADMIN — ATORVASTATIN CALCIUM 60 MG: 20 TABLET, FILM COATED ORAL at 21:27

## 2021-01-01 RX ADMIN — MORPHINE SULFATE 2 MG: 2 INJECTION, SOLUTION INTRAMUSCULAR; INTRAVENOUS at 05:35

## 2021-01-01 RX ADMIN — REMDESIVIR 100 MG: 100 INJECTION, POWDER, LYOPHILIZED, FOR SOLUTION INTRAVENOUS at 13:54

## 2021-01-01 RX ADMIN — MORPHINE SULFATE 4 MG: 2 INJECTION, SOLUTION INTRAMUSCULAR; INTRAVENOUS at 10:14

## 2021-01-01 RX ADMIN — LEVETIRACETAM 500 MG: 500 TABLET, FILM COATED ORAL at 21:35

## 2021-01-01 RX ADMIN — SODIUM HYPOCHLORITE: 1.25 SOLUTION TOPICAL at 20:17

## 2021-01-01 RX ADMIN — HEPARIN SODIUM 5000 UNITS: 5000 INJECTION INTRAVENOUS; SUBCUTANEOUS at 22:30

## 2021-01-01 RX ADMIN — CARVEDILOL 6.25 MG: 6.25 TABLET, FILM COATED ORAL at 17:22

## 2021-01-01 RX ADMIN — HYDROCODONE BITARTRATE AND ACETAMINOPHEN 1 TABLET: 7.5; 325 TABLET ORAL at 23:49

## 2021-01-01 RX ADMIN — HYDROCODONE BITARTRATE AND ACETAMINOPHEN 1 TABLET: 7.5; 325 TABLET ORAL at 23:25

## 2021-01-01 RX ADMIN — FAMOTIDINE 20 MG: 10 INJECTION INTRAVENOUS at 14:08

## 2021-01-01 RX ADMIN — ASPIRIN 81 MG: 81 TABLET, COATED ORAL at 08:31

## 2021-01-01 RX ADMIN — INSULIN LISPRO 5 UNITS: 100 INJECTION, SOLUTION INTRAVENOUS; SUBCUTANEOUS at 17:22

## 2021-01-01 RX ADMIN — MORPHINE SULFATE 4 MG: 2 INJECTION, SOLUTION INTRAMUSCULAR; INTRAVENOUS at 09:57

## 2021-01-01 RX ADMIN — INSULIN GLARGINE 10 UNITS: 100 INJECTION, SOLUTION SUBCUTANEOUS at 21:56

## 2021-01-01 RX ADMIN — HEPARIN SODIUM 5000 UNITS: 5000 INJECTION INTRAVENOUS; SUBCUTANEOUS at 09:17

## 2021-01-01 RX ADMIN — REMDESIVIR 100 MG: 100 INJECTION, POWDER, LYOPHILIZED, FOR SOLUTION INTRAVENOUS at 16:23

## 2021-01-01 RX ADMIN — DEXAMETHASONE SODIUM PHOSPHATE 6 MG: 10 INJECTION INTRAMUSCULAR; INTRAVENOUS at 20:15

## 2021-01-01 RX ADMIN — FAMOTIDINE 20 MG: 10 INJECTION INTRAVENOUS at 08:31

## 2021-01-01 RX ADMIN — HEPARIN SODIUM 5000 UNITS: 5000 INJECTION INTRAVENOUS; SUBCUTANEOUS at 08:27

## 2021-01-01 RX ADMIN — CARVEDILOL 6.25 MG: 6.25 TABLET, FILM COATED ORAL at 16:50

## 2021-01-01 RX ADMIN — INSULIN LISPRO 2 UNITS: 100 INJECTION, SOLUTION INTRAVENOUS; SUBCUTANEOUS at 14:08

## 2021-01-01 RX ADMIN — DEXAMETHASONE SODIUM PHOSPHATE 6 MG: 10 INJECTION INTRAMUSCULAR; INTRAVENOUS at 16:23

## 2021-01-01 RX ADMIN — HEPARIN SODIUM 5000 UNITS: 5000 INJECTION INTRAVENOUS; SUBCUTANEOUS at 08:32

## 2021-01-01 RX ADMIN — HYDROCODONE BITARTRATE AND ACETAMINOPHEN 1 TABLET: 7.5; 325 TABLET ORAL at 02:59

## 2021-01-01 RX ADMIN — SODIUM CHLORIDE, PRESERVATIVE FREE 10 ML: 5 INJECTION INTRAVENOUS at 08:52

## 2021-01-01 RX ADMIN — MORPHINE SULFATE 4 MG: 2 INJECTION, SOLUTION INTRAMUSCULAR; INTRAVENOUS at 15:04

## 2021-01-01 RX ADMIN — LEVETIRACETAM 500 MG: 5 INJECTION INTRAVENOUS at 22:30

## 2021-01-01 RX ADMIN — INSULIN GLARGINE 10 UNITS: 100 INJECTION, SOLUTION SUBCUTANEOUS at 20:20

## 2021-01-01 RX ADMIN — INSULIN GLARGINE 10 UNITS: 100 INJECTION, SOLUTION SUBCUTANEOUS at 21:00

## 2021-01-01 RX ADMIN — ATORVASTATIN CALCIUM 60 MG: 20 TABLET, FILM COATED ORAL at 20:13

## 2021-01-01 RX ADMIN — HYDROCODONE BITARTRATE AND ACETAMINOPHEN 1 TABLET: 7.5; 325 TABLET ORAL at 20:20

## 2021-01-01 RX ADMIN — SEVELAMER CARBONATE 800 MG: 800 TABLET, FILM COATED ORAL at 17:32

## 2021-01-01 RX ADMIN — CARVEDILOL 6.25 MG: 6.25 TABLET, FILM COATED ORAL at 08:09

## 2021-01-01 RX ADMIN — HYDROCODONE BITARTRATE AND ACETAMINOPHEN 1 TABLET: 7.5; 325 TABLET ORAL at 16:51

## 2021-01-01 RX ADMIN — SEVELAMER CARBONATE 800 MG: 800 TABLET, FILM COATED ORAL at 14:08

## 2021-01-01 RX ADMIN — HYDROCODONE BITARTRATE AND ACETAMINOPHEN 1 TABLET: 7.5; 325 TABLET ORAL at 08:38

## 2021-01-01 RX ADMIN — MORPHINE SULFATE 4 MG: 2 INJECTION, SOLUTION INTRAMUSCULAR; INTRAVENOUS at 19:02

## 2021-01-01 RX ADMIN — INSULIN LISPRO 3 UNITS: 100 INJECTION, SOLUTION INTRAVENOUS; SUBCUTANEOUS at 08:09

## 2021-01-01 RX ADMIN — HYDROCODONE BITARTRATE AND ACETAMINOPHEN 1 TABLET: 7.5; 325 TABLET ORAL at 06:14

## 2021-01-01 RX ADMIN — SODIUM CHLORIDE, PRESERVATIVE FREE 10 ML: 5 INJECTION INTRAVENOUS at 08:35

## 2021-01-01 RX ADMIN — SODIUM HYPOCHLORITE: 1.25 SOLUTION TOPICAL at 21:35

## 2021-01-01 RX ADMIN — FAMOTIDINE 20 MG: 10 INJECTION INTRAVENOUS at 20:47

## 2021-01-01 RX ADMIN — HYDROCODONE BITARTRATE AND ACETAMINOPHEN 1 TABLET: 7.5; 325 TABLET ORAL at 00:33

## 2021-01-01 RX ADMIN — DEXAMETHASONE SODIUM PHOSPHATE 6 MG: 10 INJECTION, SOLUTION INTRAMUSCULAR; INTRAVENOUS at 19:20

## 2021-01-01 RX ADMIN — FAMOTIDINE 20 MG: 10 INJECTION INTRAVENOUS at 08:28

## 2021-01-01 RX ADMIN — SODIUM HYPOCHLORITE: 1.25 SOLUTION TOPICAL at 16:09

## 2021-01-01 RX ADMIN — HEPARIN SODIUM 5000 UNITS: 5000 INJECTION INTRAVENOUS; SUBCUTANEOUS at 08:35

## 2021-01-01 RX ADMIN — CARVEDILOL 12.5 MG: 12.5 TABLET, FILM COATED ORAL at 16:52

## 2021-01-01 RX ADMIN — HYDROCODONE BITARTRATE AND ACETAMINOPHEN 1 TABLET: 7.5; 325 TABLET ORAL at 17:22

## 2021-01-01 RX ADMIN — INSULIN LISPRO 3 UNITS: 100 INJECTION, SOLUTION INTRAVENOUS; SUBCUTANEOUS at 06:30

## 2021-01-01 RX ADMIN — SODIUM HYPOCHLORITE: 1.25 SOLUTION TOPICAL at 08:09

## 2021-01-01 RX ADMIN — CARVEDILOL 6.25 MG: 6.25 TABLET, FILM COATED ORAL at 17:32

## 2021-01-01 RX ADMIN — CARVEDILOL 12.5 MG: 12.5 TABLET, FILM COATED ORAL at 17:04

## 2021-01-01 RX ADMIN — LORAZEPAM 0.5 MG: 2 INJECTION INTRAMUSCULAR; INTRAVENOUS at 12:37

## 2021-01-01 RX ADMIN — HEPARIN SODIUM 5000 UNITS: 5000 INJECTION INTRAVENOUS; SUBCUTANEOUS at 20:16

## 2021-01-01 RX ADMIN — HEPARIN SODIUM 5000 UNITS: 5000 INJECTION INTRAVENOUS; SUBCUTANEOUS at 22:39

## 2021-01-01 RX ADMIN — HEPARIN SODIUM 5000 UNITS: 5000 INJECTION INTRAVENOUS; SUBCUTANEOUS at 20:48

## 2021-01-01 RX ADMIN — ATORVASTATIN CALCIUM 60 MG: 20 TABLET, FILM COATED ORAL at 02:59

## 2021-01-01 RX ADMIN — SODIUM HYPOCHLORITE: 1.25 SOLUTION TOPICAL at 08:42

## 2021-01-01 RX ADMIN — SODIUM HYPOCHLORITE: 1.25 SOLUTION TOPICAL at 08:28

## 2021-01-01 RX ADMIN — SODIUM HYPOCHLORITE: 1.25 SOLUTION TOPICAL at 21:28

## 2021-01-01 RX ADMIN — DEXAMETHASONE SODIUM PHOSPHATE 6 MG: 10 INJECTION INTRAMUSCULAR; INTRAVENOUS at 18:42

## 2021-01-01 RX ADMIN — CARVEDILOL 6.25 MG: 6.25 TABLET, FILM COATED ORAL at 08:41

## 2021-01-01 RX ADMIN — HYDROCODONE BITARTRATE AND ACETAMINOPHEN 1 TABLET: 7.5; 325 TABLET ORAL at 12:07

## 2021-01-01 RX ADMIN — HEPARIN SODIUM 5000 UNITS: 5000 INJECTION INTRAVENOUS; SUBCUTANEOUS at 08:08

## 2021-01-01 RX ADMIN — REMDESIVIR 100 MG: 100 INJECTION, POWDER, LYOPHILIZED, FOR SOLUTION INTRAVENOUS at 13:33

## 2021-01-01 RX ADMIN — HYDROCODONE BITARTRATE AND ACETAMINOPHEN 1 TABLET: 7.5; 325 TABLET ORAL at 05:07

## 2021-01-01 RX ADMIN — SODIUM HYPOCHLORITE: 1.25 SOLUTION TOPICAL at 16:00

## 2021-01-01 RX ADMIN — REMDESIVIR 100 MG: 100 INJECTION, POWDER, LYOPHILIZED, FOR SOLUTION INTRAVENOUS at 14:00

## 2021-01-01 RX ADMIN — POTASSIUM CHLORIDE 20 MEQ: 750 TABLET, EXTENDED RELEASE ORAL at 21:35

## 2021-01-01 RX ADMIN — CARVEDILOL 6.25 MG: 6.25 TABLET, FILM COATED ORAL at 09:16

## 2021-01-01 RX ADMIN — LEVETIRACETAM 500 MG: 5 INJECTION INTRAVENOUS at 09:17

## 2021-01-01 RX ADMIN — INSULIN LISPRO 2 UNITS: 100 INJECTION, SOLUTION INTRAVENOUS; SUBCUTANEOUS at 17:32

## 2021-01-01 RX ADMIN — HEPARIN SODIUM 5000 UNITS: 5000 INJECTION INTRAVENOUS; SUBCUTANEOUS at 21:34

## 2021-01-01 RX ADMIN — CARVEDILOL 12.5 MG: 12.5 TABLET, FILM COATED ORAL at 08:27

## 2021-01-01 RX ADMIN — LEVETIRACETAM 500 MG: 5 INJECTION INTRAVENOUS at 03:01

## 2021-01-01 RX ADMIN — FAMOTIDINE 20 MG: 10 INJECTION INTRAVENOUS at 21:34

## 2021-01-01 RX ADMIN — MORPHINE SULFATE 2 MG: 2 INJECTION, SOLUTION INTRAMUSCULAR; INTRAVENOUS at 15:48

## 2021-01-01 RX ADMIN — HYDROCODONE BITARTRATE AND ACETAMINOPHEN 1 TABLET: 7.5; 325 TABLET ORAL at 09:25

## 2021-01-01 RX ADMIN — SODIUM HYPOCHLORITE: 1.25 SOLUTION TOPICAL at 22:41

## 2021-01-01 RX ADMIN — FAMOTIDINE 20 MG: 10 INJECTION INTRAVENOUS at 20:16

## 2021-01-01 RX ADMIN — METOPROLOL TARTRATE 2.5 MG: 1 INJECTION, SOLUTION INTRAVENOUS at 22:00

## 2021-01-01 RX ADMIN — HYDROCODONE BITARTRATE AND ACETAMINOPHEN 1 TABLET: 7.5; 325 TABLET ORAL at 04:15

## 2021-01-01 RX ADMIN — LORAZEPAM 0.5 MG: 2 INJECTION INTRAMUSCULAR; INTRAVENOUS at 19:49

## 2021-01-01 RX ADMIN — SODIUM CHLORIDE, PRESERVATIVE FREE 10 ML: 5 INJECTION INTRAVENOUS at 08:32

## 2021-01-01 RX ADMIN — LEVETIRACETAM 500 MG: 500 TABLET, FILM COATED ORAL at 08:41

## 2021-01-01 RX ADMIN — HEPARIN SODIUM 5000 UNITS: 5000 INJECTION INTRAVENOUS; SUBCUTANEOUS at 08:42

## 2021-01-01 RX ADMIN — ALBUMIN HUMAN 25 G: 0.25 SOLUTION INTRAVENOUS at 03:16

## 2021-01-01 RX ADMIN — FAMOTIDINE 20 MG: 10 INJECTION INTRAVENOUS at 08:42

## 2021-01-01 RX ADMIN — MORPHINE SULFATE 2 MG: 2 INJECTION, SOLUTION INTRAMUSCULAR; INTRAVENOUS at 00:34

## 2021-01-01 RX ADMIN — SEVELAMER CARBONATE 800 MG: 800 TABLET, FILM COATED ORAL at 17:22

## 2021-01-01 RX ADMIN — ATORVASTATIN CALCIUM 60 MG: 20 TABLET, FILM COATED ORAL at 20:47

## 2021-01-01 RX ADMIN — HYDROCODONE BITARTRATE AND ACETAMINOPHEN 1 TABLET: 7.5; 325 TABLET ORAL at 17:54

## 2021-01-01 RX ADMIN — SODIUM HYPOCHLORITE: 1.25 SOLUTION TOPICAL at 13:54

## 2021-01-01 RX ADMIN — ADENOSINE 12 MG: 3 INJECTION INTRAVENOUS at 19:50

## 2021-01-01 RX ADMIN — HYDROCODONE BITARTRATE AND ACETAMINOPHEN 1 TABLET: 7.5; 325 TABLET ORAL at 20:47

## 2021-01-01 RX ADMIN — DEXAMETHASONE 6 MG: 6 TABLET ORAL at 08:31

## 2021-01-01 RX ADMIN — MORPHINE SULFATE 2 MG: 2 INJECTION, SOLUTION INTRAMUSCULAR; INTRAVENOUS at 13:50

## 2021-01-01 RX ADMIN — SEVELAMER CARBONATE 800 MG: 800 TABLET, FILM COATED ORAL at 08:52

## 2021-01-01 RX ADMIN — LORAZEPAM 0.5 MG: 2 INJECTION INTRAMUSCULAR; INTRAVENOUS at 04:01

## 2021-01-01 RX ADMIN — CARVEDILOL 12.5 MG: 12.5 TABLET, FILM COATED ORAL at 17:34

## 2021-01-01 RX ADMIN — INSULIN LISPRO 4 UNITS: 100 INJECTION, SOLUTION INTRAVENOUS; SUBCUTANEOUS at 16:50

## 2021-02-17 NOTE — ED NOTES
Dylon Whitlock contacted for ride back to facility; waiting for callback.     Damion Silvestre RN  02/17/21 7001

## 2021-02-17 NOTE — ED NOTES
Pt MWF dialysis almost finished treatment today but became short of air with acute chest pain and was sent to the ED without finishing dialysis. Upon arrival she states the chest pain resolved and she is 100% on room air. Pt reports recent cough for the last week. No other symptoms at this time. Patient was wearing a face mask when I entered the room and they continued to wear a mask throughout our encounter. I wore PPE including gloves, eye protection, and a surgical mask whenever I was in the room with patient. Hand hygiene performed.     Brenton Dennis RN  02/17/21 1129

## 2021-02-17 NOTE — ED TRIAGE NOTES
Patient to er per ems from dialysis center with c/o chest pain that started while getting her dialysis. Reported she only liked aprox one hour left. Patient is patient from [facility]. Patient alert x 4 at base line. Patient has mask on in triage along with staff. No nausea/ vomiting or soa reported with this chest pain.

## 2021-02-17 NOTE — NURSING NOTE
Unable to obtain PIV on patient even with ultrasound.  Patient has one restricted arm and the other arm that is constricted

## 2021-02-17 NOTE — ED PROVIDER NOTES
EMERGENCY DEPARTMENT ENCOUNTER    Room Number:  04/04  Date of encounter:  2/17/2021  PCP: Provider, No Known  Historian: Patient      I used full protective equipment while examining this patient.  This includes face mask, gloves and protective eyewear.  I washed my hands before entering the room and immediately upon leaving the room      HPI:  Chief Complaint: Asked pain, shortness of breath  A complete HPI/ROS/PMH/PSH/SH/FH are unobtainable due to: Nothing    Context: Carolina Bhat is a 60 y.o. female who presents to the ED c/o sudden onset chest pain with shortness of breath occurring approximately 1 hour ago.  Patient states she was at dialysis when this occurred.  She states that it lasted approximately 30 minutes.  At this time patient states her symptoms have resolved.  Patient denies any history of underlying heart disease.  Patient states the chest pain was over the center of her chest without radiation.  She has had an underlying cough x1 week.  She denies any fever.  She has not had the Covid vaccine yet.  Patient is a diabetic with chronic renal disease.  She has had a previous stroke with left-sided deficits.  Patient was sent to the ER from dialysis for further evaluation.  Patient is a DNR and nursing home resident.    Review of Medical Records  I reviewed patient's last office visit with her family medicine doctor from 6/23/2020.  Patient be treated for diabetes, epilepsy, renal disease.    PAST MEDICAL HISTORY  Active Ambulatory Problems     Diagnosis Date Noted   • DM (diabetes mellitus) type II controlled with renal manifestation (CMS/[facility]ly [facility])    • Anemia of chronic renal failure, stage 3 (moderate) (CMS/Formerly [facility]) 08/07/2016   • Diabetic polyneuropathy associated with type 2 diabetes mellitus (CMS/Formerly [facility]) 08/07/2016   • ESRD on hemodialysis (CMS/Formerly [facility]) 08/07/2016   • HLD (hyperlipidemia)    • Altered mental status 12/26/2018   • Immobility 12/26/2018   • Morbid obesity (CMS/[facility]ly [facility]) 12/26/2018   • Sacral  ulcer (CMS/HCC) 12/26/2018   • Infected decubitus ulcer 12/29/2018   • Metabolic encephalopathy 01/21/2019   • Blood bacterial culture positive 01/23/2019   • Seizure disorder (CMS/HCC) 01/23/2019   • Complicated UTI (urinary tract infection) 02/13/2019     Resolved Ambulatory Problems     Diagnosis Date Noted   • Confusion 01/21/2019   • Hypotension 01/23/2019     Past Medical History:   Diagnosis Date   • Acid reflux    • Anemia    • Anxiety    • Bacteremia    • Chronic constipation    • Chronic pain    • Coronary artery disease    • CVA (cerebral vascular accident) (CMS/HCC) 2011, 2012   • Dementia    • Depression    • Dyslipidemia    • Dysphagia    • Epilepsy (CMS/HCC)    • ESRD (end stage renal disease) (CMS/HCC) 2016   • Flexion contractures    • Hard to intubate    • History of esophagitis    • Hypertension    • 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      SOCIAL HISTORY  Social History     Socioeconomic History   • Marital status:      Spouse name: Not on file   • Number of children: Not on file   • Years of education: Not on file   • Highest education level: Not on file   Tobacco Use   • Smoking status: Former Smoker   • Smokeless tobacco: Former User   Substance and Sexual Activity   • Alcohol use: No   • Drug use: No   • Sexual activity: Defer         ALLERGIES  Darvon [propoxyphene]        REVIEW OF  SYSTEMS  All systems reviewed and negative except for those discussed in HPI.       PHYSICAL EXAM    I have reviewed the triage vital signs and nursing notes.    ED Triage Vitals   Temp Heart Rate Resp BP SpO2   02/17/21 1059 02/17/21 1059 02/17/21 1124 02/17/21 1059 02/17/21 1059   97.6 °F (36.4 °C) 104 16 151/88 100 %      Temp src Heart Rate Source Patient Position BP Location FiO2 (%)   -- -- 02/17/21 1126 02/17/21 1126 --     Lying Left arm        Physical Exam  GENERAL: Alert, oriented, not distressed  HENT: head atraumatic, no nuchal rigidity  EYES: no scleral icterus, EOMI  CV: regular rhythm, regular rate, no murmur  RESPIRATORY: normal effort, CTA  ABDOMEN: soft, nontender  MUSCULOSKELETAL: Dialysis catheter in right upper arm with palpable thrill.  No deformity, FROM, no calf swelling or tenderness  NEURO: alert, chronic left-sided weakness, follows commands  SKIN: warm, dry        LAB RESULTS  Recent Results (from the past 24 hour(s))   ECG 12 Lead    Collection Time: 02/17/21 11:14 AM   Result Value Ref Range    QT Interval 374 ms   Comprehensive Metabolic Panel    Collection Time: 02/17/21 12:16 PM    Specimen: Blood   Result Value Ref Range    Glucose 124 (H) 65 - 99 mg/dL    BUN 32 (H) 8 - 23 mg/dL    Creatinine 3.61 (H) 0.57 - 1.00 mg/dL    Sodium 132 (L) 136 - 145 mmol/L    Potassium 4.3 3.5 - 5.2 mmol/L    Chloride 96 (L) 98 - 107 mmol/L    CO2 23.9 22.0 - 29.0 mmol/L    Calcium 9.0 8.6 - 10.5 mg/dL    Total Protein 8.1 6.0 - 8.5 g/dL    Albumin 3.60 3.50 - 5.20 g/dL    ALT (SGPT) 15 1 - 33 U/L    AST (SGOT) 16 1 - 32 U/L    Alkaline Phosphatase 89 39 - 117 U/L    Total Bilirubin 0.2 0.0 - 1.2 mg/dL    eGFR  African Amer 16 (L) >60 mL/min/1.73    Globulin 4.5 gm/dL    A/G Ratio 0.8 g/dL    BUN/Creatinine Ratio 8.9 7.0 - 25.0    Anion Gap 12.1 5.0 - 15.0 mmol/L   CBC Auto Differential    Collection Time: 02/17/21 12:16 PM    Specimen: Blood   Result Value Ref Range    WBC 7.46 3.40 - 10.80 10*3/mm3     RBC 3.91 3.77 - 5.28 10*6/mm3    Hemoglobin 11.4 (L) 12.0 - 15.9 g/dL    Hematocrit 34.7 34.0 - 46.6 %    MCV 88.7 79.0 - 97.0 fL    MCH 29.2 26.6 - 33.0 pg    MCHC 32.9 31.5 - 35.7 g/dL    RDW 14.7 12.3 - 15.4 %    RDW-SD 46.9 37.0 - 54.0 fl    MPV 9.6 6.0 - 12.0 fL    Platelets 215 140 - 450 10*3/mm3    Neutrophil % 68.4 42.7 - 76.0 %    Lymphocyte % 22.0 19.6 - 45.3 %    Monocyte % 8.0 5.0 - 12.0 %    Eosinophil % 0.9 0.3 - 6.2 %    Basophil % 0.4 0.0 - 1.5 %    Immature Grans % 0.3 0.0 - 0.5 %    Neutrophils, Absolute 5.10 1.70 - 7.00 10*3/mm3    Lymphocytes, Absolute 1.64 0.70 - 3.10 10*3/mm3    Monocytes, Absolute 0.60 0.10 - 0.90 10*3/mm3    Eosinophils, Absolute 0.07 0.00 - 0.40 10*3/mm3    Basophils, Absolute 0.03 0.00 - 0.20 10*3/mm3    Immature Grans, Absolute 0.02 0.00 - 0.05 10*3/mm3    nRBC 0.0 0.0 - 0.2 /100 WBC   Troponin    Collection Time: 02/17/21 12:16 PM    Specimen: Blood   Result Value Ref Range    Troponin T 0.033 (C) 0.000 - 0.030 ng/mL   BNP    Collection Time: 02/17/21 12:16 PM    Specimen: Blood   Result Value Ref Range    proBNP 3,010.0 (H) 0.0 - 900.0 pg/mL   Troponin    Collection Time: 02/17/21  2:35 PM    Specimen: Blood   Result Value Ref Range    Troponin T 0.037 (C) 0.000 - 0.030 ng/mL       Ordered the above labs and independently reviewed the results.        RADIOLOGY  Xr Chest 1 View    Result Date: 2/17/2021  PORTABLE CHEST  HISTORY: Chest pain.  FINDINGS: A single view of the chest was obtained. The study is hampered by patient positioning and inspiratory effort as well as body habitus. A single semiupright view demonstrates the heart to be within normal limits in size. There is no evidence of focal infiltrate, consolidation, effusion or congestive failure. There is mild interstitial prominence of perihilar regions bilaterally which may be accentuated by patient positioning and inspiratory effort.  This report was finalized on 2/17/2021 2:38 PM by Dr. Luisito Farah M.D.         I ordered the above noted radiological studies. Reviewed by me and discussed with radiologist.  See dictation for official radiology interpretation.    MEDICATIONS GIVEN IN ER    Medications   sodium chloride 0.9 % flush 10 mL (has no administration in time range)         PROGRESS, DATA ANALYSIS, CONSULTS, AND MEDICAL DECISION MAKING    All labs have been independently reviewed by me.  All radiology studies have been reviewed by me and discussed with radiologist dictating the report.   EKG's independently viewed and interpreted by me.  Discussion below represents my analysis of pertinent findings related to patient's condition, differential diagnosis, treatment plan and final disposition.    I have discussed case with Dr. Basurto, emergency room physician.  He has performed his own bedside examination and agrees with treatment plan.    ED Course as of Feb 17 1521   Wed Feb 17, 2021   1130 Patient presents with a now resolved episode of chest pain and shortness of breath.  Patient states this occurred at approximately 1030 and lasted approximately 30 minutes.  Differential diagnoses include not limited to ACS, PE, pneumonia, fluid overload, heart failure.    [EE]   1232 Chest x-ray interpreted by myself shows no acute infiltrate or effusion.  I will await final radiologist interpretation.    [EE]   1508 Patient in dialysis.   Creatinine(!): 3.61 [EE]   1508 Potassium: 4.3 [EE]   1508 Troponin T(!!): 0.033 [EE]   1508 proBNP(!): 3,010.0 [EE]   1509 Troponin T(!!): 0.037 [EE]   1509 Patient has stable troponins.  She has had no further chest pain here.  Plan to discharge patient with close cardiology follow-up.    [EE]   1512 EKG interpreted by myself.  Time 1114.  Sinus rhythm, 99 bpm.  Normal P/NATHAN.  QRS normal.  Nonspecific T wave changes.  Similar in comparison to previous EKG from 1/24/2019.    [EE]   1513 HEART Score: 4 (1 age, 1 EKG changes, 2 RFs)   Elevated troponin has been omitted from heart score given  that it is chronically elevated and less than previous visits.    [EE]      ED Course User Index  [EE] Merrill Lemos PA       AS OF 15:21 EST VITALS:    BP - 129/75  HR - 100  TEMP - 97.6 °F (36.4 °C)  O2 SATS - 98%        DIAGNOSIS  Final diagnoses:   Chest pain, unspecified type         DISPOSITION  Discharged             Merrill Lemos PA  02/17/21 1530

## 2021-12-09 PROBLEM — J12.82 PNEUMONIA DUE TO COVID-19 VIRUS: Status: ACTIVE | Noted: 2021-01-01

## 2021-12-09 PROBLEM — U07.1 PNEUMONIA DUE TO COVID-19 VIRUS: Status: ACTIVE | Noted: 2021-01-01

## 2021-12-09 NOTE — ED NOTES
"Pt here from [facility] facility with report of SA.  When pt is asked why she is here pt states \"I dont know\" but does state that she has bilateral hip pain from a stroke that she had in 2017.  Pt is able to answer some specific questions clearly but at other times her speech is illogical and this RN finds difficulty in understanding.  Pt will repeatedly say \"ok\" and the pitch of her voices raises at times to a \"squeaky \"level and pt states it is the pain that causes her to do this  Pt states she does not wear O2 generally.  Pt denies cough     Sandy Torre RN  12/09/21 1650    "

## 2021-12-09 NOTE — ED NOTES
Patient from [facility] via EMS; call was for low oxygen saturations and COVID-19. Upon EMS arrival patient was maintaining low 90s on room air. Patient has no complaints at this time.      Ange Franco RN  12/09/21 6960

## 2021-12-10 NOTE — ED NOTES
Nursing report ED to floor  Carolina Bhat  60 y.o.  female    HPI :   Chief Complaint   Patient presents with   • Exposure To Known Illness       Admitting doctor:   Rhys Mary MD    Admitting diagnosis:   The primary encounter diagnosis was Pneumonia due to COVID-19 virus. Diagnoses of ESRD on hemodialysis (HCC), Immobility syndrome, and Hypoxia were also pertinent to this visit.    Code status:   Current Code Status     Date Active Code Status Order ID Comments User Context       Prior    Advance Care Planning Activity          Allergies:   Darvon [propoxyphene]    Intake and Output  No intake or output data in the 24 hours ending 12/09/21 2020    Weight:   There were no vitals filed for this visit.    Most recent vitals:   Vitals:    12/09/21 1959 12/09/21 2000 12/09/21 2007 12/09/21 2014   BP: 113/73 108/76     Pulse: 111 112 115 110   Resp:       Temp:       SpO2: 90% 90% 90% 91%       Active LDAs/IV Access:   Lines, Drains & Airways     Active LDAs     Name Placement date Placement time Site Days    Peripheral IV 12/09/21 1657 Left Wrist 12/09/21  1657  Wrist  less than 1                Labs (abnormal labs have a star):   Labs Reviewed   RESPIRATORY PANEL PCR W/ COVID-19 (SARS-COV-2) CHAYITO/JESSIE/HIMANSHU/PAD/COR/MAD/KEMAR IN-HOUSE, NP SWAB IN UTM/VTP, 3-4 HR TAT - Abnormal; Notable for the following components:       Result Value    COVID19 Detected (*)     All other components within normal limits    Narrative:     In the setting of a positive respiratory panel with a viral infection PLUS a negative procalcitonin without other underlying concern for bacterial infection, consider observing off antibiotics or discontinuation of antibiotics and continue supportive care. If the respiratory panel is positive for atypical bacterial infection (Bordetella pertussis, Chlamydophila pneumoniae, or Mycoplasma pneumoniae), consider antibiotic de-escalation to target atypical bacterial infection.   COMPREHENSIVE METABOLIC  PANEL - Abnormal; Notable for the following components:    Glucose 165 (*)     BUN 50 (*)     Creatinine 5.79 (*)     Chloride 97 (*)     Albumin 2.90 (*)     AST (SGOT) 75 (*)     eGFR   Amer 9 (*)     Anion Gap 15.1 (*)     All other components within normal limits    Narrative:     GFR Normal >60  Chronic Kidney Disease <60  Kidney Failure <15     BNP (IN-HOUSE) - Abnormal; Notable for the following components:    proBNP 11,613.0 (*)     All other components within normal limits    Narrative:     Among patients with dyspnea, NT-proBNP is highly sensitive for the detection of acute congestive heart failure. In addition NT-proBNP of <300 pg/ml effectively rules out acute congestive heart failure with 99% negative predictive value.    Results may be falsely decreased if patient taking Biotin.     TROPONIN (IN-HOUSE) - Abnormal; Notable for the following components:    Troponin T 0.464 (*)     All other components within normal limits    Narrative:     Troponin T Reference Range:  <= 0.03 ng/mL-   Negative for AMI  >0.03 ng/mL-     Abnormal for myocardial necrosis.  Clinicians would have to utilize clinical acumen, EKG, Troponin and serial changes to determine if it is an Acute Myocardial Infarction or myocardial injury due to an underlying chronic condition.       Results may be falsely decreased if patient taking Biotin.     PROCALCITONIN - Abnormal; Notable for the following components:    Procalcitonin 6.38 (*)     All other components within normal limits    Narrative:     As a Marker for Sepsis (Non-Neonates):     1. <0.5 ng/mL represents a low risk of severe sepsis and/or septic shock.  2. >2 ng/mL represents a high risk of severe sepsis and/or septic shock.    As a Marker for Lower Respiratory Tract Infections that require antibiotic therapy:  PCT on Admission     Antibiotic Therapy             6-12 Hrs later  >0.5                          Strongly Recommended            >0.25 - <0.5              "Recommended  0.1 - 0.25                  Discouraged                       Remeasure/reassess PCT  <0.1                         Strongly Discouraged         Remeasure/reassess PCT      As 28 day mortality risk marker: \"Change in Procalcitonin Result\" (>80% or <=80%) if Day 0 (or Day 1) and Day 4 values are available. Refer to http://www.Radio NEXTpct-calculator.[place]/    Change in PCT <=80 %   A decrease of PCT levels below or equal to 80% defines a positive change in PCT test result representing a higher risk for 28-day all-cause mortality of patients diagnosed with severe sepsis or septic shock.    Change in PCT >80 %   A decrease of PCT levels of more than 80% defines a negative change in PCT result representing a lower risk for 28-day all-cause mortality of patients diagnosed with severe sepsis or septic shock.               C-REACTIVE PROTEIN - Abnormal; Notable for the following components:    C-Reactive Protein 44.52 (*)     All other components within normal limits   CBC WITH AUTO DIFFERENTIAL - Abnormal; Notable for the following components:    RBC 3.38 (*)     Hemoglobin 10.3 (*)     Hematocrit 32.0 (*)     All other components within normal limits   MANUAL DIFFERENTIAL - Abnormal; Notable for the following components:    Neutrophil % 85.9 (*)     Lymphocyte % 10.1 (*)     Monocyte % 3.0 (*)     Lymphocytes Absolute 0.68 (*)     All other components within normal limits   LIPASE - Normal   LACTIC ACID, PLASMA - Normal   BLOOD CULTURE   BLOOD CULTURE   TROPONIN (IN-HOUSE)   CBC AND DIFFERENTIAL    Narrative:     The following orders were created for panel order CBC & Differential.  Procedure                               Abnormality         Status                     ---------                               -----------         ------                     CBC Auto Differential[[id number]]        Abnormal            Final result                 Please view results for these tests on the individual orders.       EKG: "   ECG 12 Lead    (Results Pending)   ECG 12 Lead    (Results Pending)   ECG 12 Lead    (Results Pending)   ECG 12 Lead    (Results Pending)       Meds given in ED:   Medications   sodium chloride 0.9 % flush 10 mL (has no administration in time range)   dexamethasone (DECADRON) injection 6 mg (6 mg Intravenous Given 12/9/21 1920)   adenosine (ADENOCARD) injection 12 mg (12 mg Intravenous Given 12/9/21 1950)       Imaging results:  XR Chest 1 View    Result Date: 12/9/2021  There is evidence of bilateral interstitial and ground-glass infiltrates consistent with bilateral pneumonia and/or pulmonary edema. Mild cardiomegaly is noted.        Ambulatory status:   - bed rest, turning q2    Social issues:   Social History     Socioeconomic History   • Marital status:    Tobacco Use   • Smoking status: Former Smoker   • Smokeless tobacco: Former User   Substance and Sexual Activity   • Alcohol use: No   • Drug use: No   • Sexual activity: Defer       NIH Stroke Scale:        Nursing report ED to floor:     Lacey Calderón RN  12/09/21 2021      Pt had rhythm change 1917 d/t SVT. 's. Pt given adenosine 12mg. Tolerated well. Pt now back to sinus tachycardia. VSS.      Lacey Calderón RN  12/09/21 2023

## 2021-12-10 NOTE — CASE MANAGEMENT/SOCIAL WORK
Continued Stay Note  [facility]     Patient Name: Carolina Bhat  MRN: [id number]  Today's Date: 12/10/2021    Admit Date: 12/9/2021     Discharge Plan     Row Name 12/10/21 0707       Plan    Plan Return back to [place] [facility], awaiting confirmation from family    Plan Comments Spoke with Kylah/Hermann [facility] pt is from [facility] medicaid bed with medicaid bed hold and can return. Awaiting call back from miquel William. Packet in ccp office. Des GONZALES/CCP    Row Name 12/10/21 9937       Plan    Plan Pending    Plan Comments Due to patient orientation (confused), CCP attempted to screen patient’s daughter by calling her over the phone; she did not answer. CCP left VMM for patient’s daughter Ida Bhat to screen and discuss d/c planning. According to ED, patient came from [place] [facility]. CCP left VMM for Kylah/Hermann [facility] to find out what level of care and if she can return. Haven Zambrano, TERESITA               Discharge Codes    No documentation.                     Britany Monteiro, RN

## 2021-12-10 NOTE — ED NOTES
Spoke with Emy in lab.  This RN to send troponin with pt label to lab with collection time and emp ID      Sandy Torre RN  12/09/21 2009

## 2021-12-10 NOTE — DISCHARGE PLACEMENT REQUEST
"Shyanne Bhat (60 y.o. Female)             Date of Birth Social Security Number Address Home Phone MRN    1960  [address]  [address]  [facility] [address] [phone] [id number]    Muslim Marital Status             None        Admission Date Admission Type Admitting Provider Attending Provider Department, Room/Bed    12/9/21 Emergency Rhys Mary MD [place], Marlon R, MD [address], [address]    Discharge Date Discharge Disposition Discharge Destination                         Attending Provider: Rhys Mary MD    Allergies: Darvon [Propoxyphene]    Isolation: Enh Drop/Con, Contact   Infection: COVID (confirmed) (12/09/21), Anabell Auris (rule out) (12/10/21)   Code Status: Prior   Advance Care Planning Activity    Ht: 177 cm (69.69\")   Wt: 85.3 kg (188 lb)    Admission Cmt: None   Principal Problem: None                Active Insurance as of 12/9/2021     Primary Coverage     Payor Plan Insurance Group Employer/Plan Group    MEDICARE MEDICARE A & B      Payor Plan Address Payor Plan Phone Number Payor Plan Fax Number Effective Dates    [address] [phone]  5/1/2014 - None Entered    [facility] [id number]       Subscriber Name Subscriber Birth Date Member ID       SHYANNE BHAT 1960 [id number]           Secondary Coverage     Payor Plan Insurance Group Employer/Plan Group    [place]      Payor Plan Address Payor Plan Phone Number Payor Plan Fax Number Effective Dates    [address] [phone]  8/1/2013 - None Entered    [facility] [id number]       Subscriber Name Subscriber Birth Date Member ID       SHYANNE BHAT 1960 [id number]                 Emergency Contacts      (Rel.) Home Phone Work Phone Mobile Phone    Ida Bhat (Guardian) [phone] -- --    HomeGisella (Daughter) [phone] -- --    HomeAnahy (Daughter) [phone] -- --              "

## 2021-12-10 NOTE — CONSULTS
[facility] Consult Note      Patient Name: Carolina Bhat  : 1960  MRN: [id number]  Primary Care Physician:  Provider, No Known  Referring Physician: Rhys Mary MD  Date of admission: 2021    Subjective     Reason for Consult:   ESRD     HPI:   Carolina Bhat is a 60 y.o. female known to have a hx of end-stage renal disease on maintenance in center hemodialysis on  schedule.  End-stage renal disease was secondary to diabetic and hypertensive nephrosclerosis.  Patient also has a history of hypertension, diabetes mellitus type 2, CVA and history of dyslipidemia.  She presented to the hospital last night with shortness of breath and acute hypoxemic respiratory failure secondary to COVID-19 pneumonia.  It seems that the patient from record has missed dialysis on Wednesday.  To note that the patient very poor historian and she has some expressive aphasia so most of the history was taken from chart review.    Review of Systems:   14 point review of systems is otherwise negative except for mentioned above on HPI    Personal History     Past Medical History:   Diagnosis Date   • Acid reflux    • Anemia    • Anxiety    • Bacteremia    • Chest pain    • Chronic constipation    • Chronic pain    • Coronary artery disease    • CVA (cerebral vascular accident) ([facility]) ,    • Dementia ([facility])    • Depression    • DM (diabetes mellitus) type II controlled with renal manifestation ([facility])    • Dyslipidemia    • Dysphagia     G-tube removed    • Epilepsy ([facility])    • ESRD (end stage renal disease) ([facility]) 2016    Started HD 2016  [facility]   • Flexion contractures    • Hard to intubate     pt does not know   • History of esophagitis    • HLD (hyperlipidemia)    • Hypertension    • 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    Social History:  reports that she has quit smoking. She has quit using smokeless tobacco. She reports that she does not drink alcohol and does not use drugs.    Home Medications:  Prior to Admission medications    Medication Sig Start Date End Date Taking? Authorizing Provider   atorvastatin (LIPITOR) 20 MG tablet Take 60 mg by mouth Every Night.   Yes ProviderRuddy MD   carvedilol (COREG) 12.5 MG tablet Take 0.5 tablets by mouth 2 (Two) Times a Day With Meals.  Patient taking differently: Take 3.125 mg by mouth 2 (Two) Times a Day With Meals. 1/26/19  Yes Ramon Roberson MD   doxazosin (CARDURA) 4 MG tablet Take 0.5 tablets by mouth Daily. 1/26/19  Yes Ramon Roberson MD   Ferric Citrate 1  MG(Fe) tablet Take 2 tablets by mouth 2 (Two) Times a Day Before Meals.   Yes ProviderRuddy MD   HYDROcodone-acetaminophen (NORCO) 7.5-325 MG per tablet Take 1 tablet by mouth Every 6 (Six) Hours As Needed for Moderate Pain . 11/12/19  Yes Merrill Odonnell MD   insulin glargine (Semglee) 100 UNIT/ML injection Inject 12 Units under the skin into the appropriate area as directed Every Night. Inject 12 units at bedtime, hold for blood sugar less than 180   Yes ProviderRuddy MD   Lactobacillus (FLORANEX PO) Take 1 tablet by mouth 2 (Two) Times a Day.   Yes Ruddy Packer MD   levETIRAcetam (KEPPRA) 500 MG tablet Take 1 tablet by mouth Every 12 (Twelve) Hours. 1/26/19  Yes Ramon Roberson MD   Lidocaine-Prilocaine, Bulk, 2-2 % cream Apply 2.5  % topically to the appropriate area as directed See Admin Instructions. Apply to dialysis access arm topically in the morning every MWF for dialysis   Yes Ruddy Packer MD   Lifitegrast (Xiidra) 5 % ophthalmic solution Administer 1 drop to both eyes 2 (Two) Times a Day.   Yes Ruddy Packer MD   Multiple Vitamins-Minerals (MULTIVITAMIN WITH MINERALS) tablet tablet Take 1 tablet by mouth Daily.   Yes Ruddy Packer MD   pantoprazole (PROTONIX) 40 MG EC tablet Take 40 mg by mouth Daily.   Yes Ruddy Packer MD   silver sulfadiazine (SILVADENE, SSD) 1 % cream Apply 1 application topically to the appropriate area as directed 2 (Two) Times a Day. Left buttocks   Yes Ruddy Packer MD   simethicone (MYLICON) 80 MG chewable tablet Chew 80 mg 3 (Three) Times a Day.   Yes Ruddy Packer MD   traZODone (DESYREL) 100 MG tablet Take 100 mg by mouth Every Night.   Yes Ruddy Packer MD   acetaminophen (TYLENOL) 500 MG tablet Take 500 mg by mouth Every 6 (Six) Hours As Needed for Mild Pain .    Ruddy Packer MD   bisacodyl (DULCOLAX) 10 MG suppository Insert 10 mg into the rectum Daily As Needed for Constipation.    Ruddy Packer MD   docusate sodium (COLACE) 250 MG capsule Take 250 mg by mouth daily.    Ruddy Packer MD   ferrous sulfate 325 (65 FE) MG tablet Take 325 mg by mouth 2 (two) times a day.    Ruddy Packer MD   insulin aspart (NOVOLOG FLEXPEN) 100 UNIT/ML solution pen-injector sc pen Inject  under the skin into the appropriate area as directed 3 (Three) Times a Day With Meals. Per SS: 200-249=2, 250-299=4, 300-349=6, 350-399=8, 400+=10    Ruddy Packer MD   ondansetron (ZOFRAN) 4 MG tablet Take 2 mg by mouth Every 8 (Eight) Hours As Needed for Nausea or Vomiting.    Ruddy Packer MD   sevelamer (RENVELA) 800 MG tablet Take 800 mg by mouth 3 (Three) Times a Day With Meals.    Ruddy Packer MD        Allergies:  Allergies   Allergen Reactions   • Darvon [Propoxyphene] Confusion       Objective     Vitals:   Temp:  [97.3 °F (36.3 °C)-100 °F (37.8 °C)] 97.3 °F (36.3 °C)  Heart Rate:  [] 89  Resp:  [20-26] 20  BP: (105-142)/(62-83) 129/81  Flow (L/min):  [2-4] 4  No intake or output data in the 24 hours ending 12/10/21 0943    Physical Exam:   Constitutional: Awake difficult to understand has expressive aphasia.  Responds to basic question  HEENT: Sclera anicteric, no conjunctival injection  Neck: Supple, no thyromegaly, no lymphadenopathy, trachea at midline, no JVD  Respiratory: Bilateral basal crackles  Cardiovascular: RRR, no murmurs, no rubs or gallops, no carotid bruit  Gastrointestinal: Positive bowel sounds, abdomen is soft, nontender and nondistended  : No palpable bladder  Musculoskeletal: No edema, no clubbing or cyanosis  Psychiatric: Appropriate affect, cooperative  Neurologic: Left-sided weakness  Access: Right upper extremity AV fistula with good thrill    Scheduled Meds:     atorvastatin, 60 mg, Oral, Nightly  carvedilol, 6.25 mg, Oral, BID With Meals  dexamethasone, 6 mg, Intravenous, Q24H  famotidine, 20 mg, Intravenous, Q12H  heparin (porcine), 5,000 Units, Subcutaneous, Q12H  insulin lispro, 0-7 Units, Subcutaneous, TID AC  levETIRAcetam, 500 mg, Intravenous, Q12H  [START ON 12/11/2021] remdesivir, 100 mg, Intravenous, Q24H  sevelamer, 800 mg, Oral, TID With Meals      IV Meds:        Results Reviewed:   I have personally reviewed the results from the time of this admission to 12/10/2021 09:43 EST     Lab Results   Component Value Date    GLUCOSE 197 (H) 12/10/2021    CALCIUM 9.3 12/10/2021     (L) 12/10/2021    K 4.2 12/10/2021    CO2 19.3 (L) 12/10/2021    CL 97 (L) 12/10/2021    BUN 58 (H) 12/10/2021    CREATININE 6.12 (H) 12/10/2021    EGFRIFAFRI 8 (L) 12/10/2021    EGFRIFNONA  12/10/2021      Comment:      <15 Indicative of kidney failure.    BCR 9.5 12/10/2021     ANIONGAP 18.7 (H) 12/10/2021      Lab Results   Component Value Date    MG 2.3 02/14/2019    PHOS 2.6 02/16/2019    ALBUMIN 2.40 (L) 12/10/2021           Assessment / Plan     ASSESSMENT:  1.  End-stage renal disease on maintenance in center hemodialysis on a Monday Wednesday Friday schedule.  Last dialyzed seems to be on Monday.  Currently complaining of shortness of breath due to volume overload and COVID-19 pneumonia.  2.  Hypertension with end-stage renal disease: Blood pressure controlled.  3.  Acute hypoxemic respiratory failure secondary to COVID-19 pneumonia versus volume overload.  4.  Hyperphosphatemia on binders  5.  COVID-19 pneumonia.  6.  History of CVA  7.Diabetes mellitus type 2 diet controlled now.  8.  Anemia: Likely secondary to end-stage renal disease.    Plan:   We will dialyze today per schedule and will c continue scheduled dialysis on Monday Wednesday Friday.  Attempt to UF as tolerated to improve her volume status and her oxygenation.  Surveillance labs  Check phosphorus  Iron panel    Thank you for involving us in the care of Carolina Bhat.  Please feel free to call with any questions.    Joshua Goodson MD  12/10/21  09:43 [facility]    [facility]  [phone]      Much of this encounter note is an electronic transcription/translation of spoken language to printed text. The electronic translation of spoken language may permit erroneous, or at times, nonsensical words or phrases to be inadvertently transcribed; Although I have reviewed the note for such errors, some may still exist.

## 2021-12-10 NOTE — PROGRESS NOTES
[facility]  Clinical Pharmacy Department     Remdesivir Review Note    Carolina Bhat is a 60 y.o. female with confirmed COVID-19 infection on day 1 of hospitalization.     Consulting Provider:  Dr. Jack Baird  Date of Confirmed SARS-CoV-2: 12/9/21  Date of Symptom Onset: <6 days per order  Planned Duration of Therapy: 5 days  Other Antimicrobials: None currently  Hydroxychloroquine or chloroquine prior to arrival: No    Allergies  Allergies as of 12/09/2021 - Reviewed 12/09/2021   Allergen Reaction Noted    Darvon [propoxyphene] Confusion 03/18/2016       Microbiology:  Microbiology Results (last 10 days)       Procedure Component Value - Date/Time    Respiratory Panel PCR w/COVID-19(SARS-CoV-2) CHAYITO/JESSIE/HIMANSHU/PAD/COR/MAD/KEMAR In-House, NP Swab in UTM/VTM, 3-4 HR TAT - Swab, Nasopharynx [[id number]]  (Abnormal) Collected: 12/09/21 1804    Lab Status: Final result Specimen: Swab from Nasopharynx Updated: 12/09/21 1911     ADENOVIRUS, PCR Not Detected     Coronavirus 229E Not Detected     Coronavirus HKU1 Not Detected     Coronavirus NL63 Not Detected     Coronavirus OC43 Not Detected     COVID19 Detected     Human Metapneumovirus Not Detected     Human Rhinovirus/Enterovirus Not Detected     Influenza A PCR Not Detected     Influenza B PCR Not Detected     Parainfluenza Virus 1 Not Detected     Parainfluenza Virus 2 Not Detected     Parainfluenza Virus 3 Not Detected     Parainfluenza Virus 4 Not Detected     RSV, PCR Not Detected     Bordetella pertussis pcr Not Detected     Bordetella parapertussis PCR Not Detected     Chlamydophila pneumoniae PCR Not Detected     Mycoplasma pneumo by PCR Not Detected    Narrative:      In the setting of a positive respiratory panel with a viral infection PLUS a negative procalcitonin without other underlying concern for bacterial infection, consider observing off antibiotics or discontinuation of antibiotics and continue supportive care. If the respiratory panel is positive  for atypical bacterial infection (Bordetella pertussis, Chlamydophila pneumoniae, or Mycoplasma pneumoniae), consider antibiotic de-escalation to target atypical bacterial infection.            Radiology/Imaging:  XR Chest 1 View    Result Date: 12/9/2021  EXAMINATION: SINGLE VIEW CHEST RADIOGRAPH  HISTORY: 60-year-old female with a history of Covid and shortness of air.  FINDINGS: An AP portable chest radiograph was obtained. Comparison is made to a chest radiograph dated 02/17/2021. There is hazy opacification seen throughout both lungs with interstitial opacification in both lungs, left worse than right. The cardiac silhouette is mildly enlarged. The visualized osseous structures appear normal.      Impression: There is evidence of bilateral interstitial and ground-glass infiltrates consistent with bilateral pneumonia and/or pulmonary edema. Mild cardiomegaly is noted.  This report was finalized on 12/9/2021 9:04 PM by Dr. Nacho Chong M.D.        Vitals/Labs/I&O  [unfilled]    Results from last 7 days   Lab Units 12/09/21  1646   WBC 10*3/mm3 6.10     Results from last 7 days   Lab Units 12/09/21  1646   PROCALCITONIN ng/mL 6.38*     Results from last 7 days   Lab Units 12/09/21  1802   AST (SGOT) U/L 75*      Results from last 7 days   Lab Units 12/09/21  1802   ALT (SGPT) U/L 31       Estimated Creatinine Clearance: 12.2 mL/min (A) (by C-G formula based on SCr of 5.79 mg/dL (H)).  Results from last 7 days   Lab Units 12/09/21  1802   BUN mg/dL 50*   CREATININE mg/dL 5.79*     Intake & Output (last 3 days)       None            Assessment/Plan:    Patient is hospitalized with confirmed, severe COVID-19 infection and started on remdesivir 200 mg IV once followed by 100 mg IV daily for 4 days (5 day total duration). All inclusions, exclusions, and monitoring requirements listed below have been reviewed.    Patient is hospitalized with confirmed COVID-19 infection  Patient is requiring supplemental oxygen to  maintain oxygen saturations of ? 94%   Baseline and daily LFTs and Scr have been ordered prior to remdesivir initiation  ALT is not ? 10 times the upper limit of normal  Patient is not on concomitant hydroxychloroquine or chloroquine  Patient does not require invasive mechanical ventilation (IMV) or ECMO  Patient is within 10 days from symptom onset       Thank you for involving pharmacy in this patient's care. Please contact pharmacy with any questions or concerns.                           Franklin Nelson [facility]  Clinical Pharmacist  12/09/21 23:43 EST

## 2021-12-10 NOTE — ED NOTES
Pt noted to have rhythm change.  's with c/o chest pain. MD Keith notified.  This RN obtained EKG, orders noted for Adenosine.  Adenosine given with MD Keith at bedside.  Pt tolerated well.  Rhythm converted back to Sinus tach.  New EKG obtained.  VSS.  Will cont to monitor     Sandy Torre RN  12/09/21 2015

## 2021-12-10 NOTE — NURSING NOTE
12/10/21 1350   Wound 12/09/21 2116 lower coccyx Pressure Injury   Placement Date/Time: 12/09/21 2116   Present on Hospital Admission: Yes  Orientation: lower  Location: coccyx  Primary Wound Type: Pressure Injury  Stage, Pressure Injury : Stage 2   Base necrotic; moist; slough  (wound primarily to coccyx and L gluteal region, R gluteal area with peeling, dark friable skin)   Periwound   (peeling friable skin)   Periwound Temperature warm   Wound Length (cm) 6 cm   Wound Width (cm) 12 cm   Drainage Characteristics/Odor malodorous; tan   Drainage Amount small   Care, Wound   (dakins to be ordered)   Dressing Care dressing reinforced  (optifoam reapplied)     Wound/ostomy - patient presents to hospital with unstageable pressure injury to sacrococcygeal area. Wound with surrounding friable skin so wound may increase in size if skin sloughs away. Mild odor, Per nursing home records silvadine has been used to treat but wound requires more of a debriding agent, will order dakins and reevaluate next week. Place Dakins moist 4x4 gauze over the necrotic tissue and cover with ABD pad, secure with tape. Patient needs ongoing offloading and position changes. Will need a waffle overlay placed to bed to redistribute pressure. Patient has no independent mobility, L hemiparesis.

## 2021-12-10 NOTE — CONSULTS
CONSULT NOTE    Infectious Diseases - Jack Hoyt MD  [facility]       Patient Identification:  Name: Carolina Bhat  Age: 60 y.o.  Sex: female  :  1960  MRN: [id number]             Date of Consultation: 2021      Primary Care Physician: Provider, No Known                               Requesting Physician: Dr. Mary  Reason for Consultation: COVID-19 pneumonia with hypoxia    Impression: Patient is a 60-year-old female with complicated past medical history including prior CVA with left-sided hemiplegia, diabetes mellitus end-stage renal disease on hemodialysis via right arm AV fistula as well as chronic pain and immobility was sent from [facility] for low oxygen saturations dropping in 70s and recent COVID-19 infection test.  Work-up in the emergency room revealed oxygen saturation 90% on room air with chest x-ray showing evidence of lung infiltrate.  Patient was started on dexamethasone and is being admitted for further care.  Upon conversation with the nurse in evaluating the patient it is noted that she was requiring up to 5 L nasal cannula to maintain the saturation above 98% and patient is complaining of feeling poorly and tired.  This presentation and above context is consistent with:  1-systemic COVID-19 infection with  2-COVID-19 pneumonia with hypoxia further confounded by  3-possible volume overload in the setting of end-stage renal disease on hemodialysis  4-history of CVA  5-vascular dementia  6-diabetes mellitus  7-immobilization syndrome      Recommendations/Discussions:  Given the information we have it appears that patient would benefit combination of dexamethasone and remdesivir with close monitoring of her clinical course in terms of need for oxygen supplementation and sense of wellbeing.  Patient would benefit from aggressive volume management and dialysis as scheduled prevent volume overload.  Follow-up on her inflammatory markers.  If she shows  decline in her respiratory status and needing oxygen supplementation with devices such as high flow nasal cannula or positive pressure ventilation then she may be a candidate for Actemra or baricitinib depending on the timeframe in which this deterioration occurs.  Conversely if she continues to improve and maintain oxygen saturation above 94% on room air remdesivir may not have to be continued.  Thank you Dr. Mary for letting me be the part of your patient care please see above impression and recommendations.      History of Present Illness:    Patient is a 60-year-old female with complicated past medical history including prior CVA with left-sided hemiplegia, diabetes mellitus end-stage renal disease on hemodialysis via right arm AV fistula as well as chronic pain and immobility was sent from [facility] for low oxygen saturations dropping in 70s and recent COVID-19 infection test.  Work-up in the emergency room revealed oxygen saturation 90% on room air with chest x-ray showing evidence of lung infiltrate.  Patient was started on dexamethasone and is being admitted for further care.  Upon conversation with the nurse in evaluating the patient it is noted that she was requiring up to 5 L nasal cannula to maintain the saturation above 98% and patient is complaining of feeling poorly and tired.        Past Medical History:  Past Medical History:   Diagnosis Date   • Acid reflux    • Anemia    • Anxiety    • Bacteremia    • Chest pain    • Chronic constipation    • Chronic pain    • Coronary artery disease    • CVA (cerebral vascular accident) ([facility]) 2011, 2012   • Dementia ([facility])    • Depression    • DM (diabetes mellitus) type II controlled with renal manifestation ([facility])    • Dyslipidemia    • Dysphagia     G-tube removed 2015   • Epilepsy ([facility])    • ESRD (end stage renal disease) ([facility]) 2016    Started HD 2016  [facility]   • Flexion contractures    • Hard to intubate     pt does not know   •  History of esophagitis    • HLD (hyperlipidemia)    • Hypertension    • 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 (HCC) 08/09/2017   • Myocardial infarction (HCC)    • Nausea & vomiting    • Open angle glaucoma with borderline intraocular pressure, bilateral 08/09/2017   • Pseudobulbar affect    • Venous insufficiency (chronic) (peripheral)      Past Surgical History:  Past Surgical History:   Procedure Laterality Date   • ARTERIOVENOUS FISTULA/SHUNT SURGERY Right 3/21/2016    Procedure: RT BRACHIAL CEPHALIC FISTULA;  Surgeon: Alla Price Jr., MD;  Location: [facility];  Service:    • BREAST BIOPSY        Home Meds:  (Not in a hospital admission)    Current Meds:     Current Facility-Administered Medications:   •  acetaminophen (TYLENOL) tablet 500 mg, 500 mg, Oral, Q6H PRN, Rhys Mary MD  •  atorvastatin (LIPITOR) tablet 60 mg, 60 mg, Oral, Nightly, Rhys Mary MD  •  carvedilol (COREG) tablet 6.25 mg, 6.25 mg, Oral, BID With Meals, Rhys Mary MD  •  [START ON 12/10/2021] dexamethasone (DECADRON) injection 6 mg, 6 mg, Intravenous, Q24H, Rhys Mary MD  •  dextrose (D50W) (25 g/50 mL) IV injection 25 g, 25 g, Intravenous, Q15 Min Tyra HOLLINS Marlon R, MD  •  dextrose (GLUTOSE) oral gel 15 g, 15 g, Oral, Q15 Min Tyra HOLLINS Marlon R, MD  •  famotidine (PEPCID) injection 20 mg, 20 mg, Intravenous, Q12H, Rhys Mary MD  •  glucagon (human recombinant) (GLUCAGEN DIAGNOSTIC) injection 1 mg, 1 mg, Subcutaneous, Q15 Min Tyra HOLLINS Marlon R, MD  •  heparin (porcine) 5000 UNIT/ML injection 5,000 Units, 5,000 Units, Subcutaneous, Q12H, Rhys Mary MD  •  HYDROcodone-acetaminophen (NORCO) 7.5-325 MG per tablet 1 tablet, 1 tablet, Oral, Q6H Tyra HOLLINS Marlon R, MD  •  [START ON 12/10/2021] insulin lispro (ADMELOG) injection 0-7 Units, 0-7 Units, Subcutaneous, TID TERRENCE, Rhys Mary MD  •  levETIRAcetam in NaCl 0.82% (KEPPRA) IVPB 500 mg,  500 mg, Intravenous, Q12H, Rhys Mary MD  •  ondansetron (ZOFRAN) injection 4 mg, 4 mg, Intravenous, Q6H Tyra HOLLINS Marlon R, MD  •  [START ON 12/10/2021] sevelamer (RENVELA) tablet 800 mg, 800 mg, Oral, TID With Meals, Rhys Mary MD  •  [COMPLETED] Insert peripheral IV, , , Once **AND** sodium chloride 0.9 % flush 10 mL, 10 mL, Intravenous, PRNTyra Marlon R, MD    Current Outpatient Medications:   •  acetaminophen (TYLENOL) 500 MG tablet, Take 500 mg by mouth Every 6 (Six) Hours As Needed for Mild Pain ., Disp: , Rfl:   •  atorvastatin (LIPITOR) 20 MG tablet, Take 60 mg by mouth Every Night., Disp: , Rfl:   •  bisacodyl (DULCOLAX) 10 MG suppository, Insert 10 mg into the rectum Daily As Needed for Constipation., Disp: , Rfl:   •  carvedilol (COREG) 12.5 MG tablet, Take 0.5 tablets by mouth 2 (Two) Times a Day With Meals., Disp: , Rfl:   •  docusate sodium (COLACE) 250 MG capsule, Take 250 mg by mouth daily., Disp: , Rfl:   •  doxazosin (CARDURA) 4 MG tablet, Take 0.5 tablets by mouth Daily., Disp: , Rfl:   •  ferrous sulfate 325 (65 FE) MG tablet, Take 325 mg by mouth 2 (two) times a day., Disp: , Rfl:   •  HYDROcodone-acetaminophen (NORCO) 7.5-325 MG per tablet, Take 1 tablet by mouth Every 6 (Six) Hours As Needed for Moderate Pain ., Disp: 120 tablet, Rfl: 0  •  insulin aspart (NOVOLOG FLEXPEN) 100 UNIT/ML solution pen-injector sc pen, Inject  under the skin into the appropriate area as directed 3 (Three) Times a Day With Meals. Per SS: 200-249=2, 250-299=4, 300-349=6, 350-399=8, 400+=10, Disp: , Rfl:   •  levETIRAcetam (KEPPRA) 500 MG tablet, Take 1 tablet by mouth Every 12 (Twelve) Hours., Disp: 60 tablet, Rfl: 0  •  Multiple Vitamins-Minerals (MULTIVITAMIN WITH MINERALS) tablet tablet, Take 1 tablet by mouth Daily., Disp: , Rfl:   •  ondansetron (ZOFRAN) 4 MG tablet, Take 2 mg by mouth Every 8 (Eight) Hours As Needed for Nausea or Vomiting., Disp: , Rfl:   •  sevelamer (RENVELA) 800 MG  tablet, Take 800 mg by mouth 3 (Three) Times a Day With Meals., Disp: , Rfl:   •  simethicone (MYLICON) 80 MG chewable tablet, Chew 80 mg 3 (Three) Times a Day., Disp: , Rfl:   Allergies:  Allergies   Allergen Reactions   • Darvon [Propoxyphene] Confusion     Social History:   Social History     Tobacco Use   • Smoking status: Former Smoker   • Smokeless tobacco: Former User   Substance Use Topics   • Alcohol use: No      Family History:  History reviewed. No pertinent family history.       Review of Systems  See history of present illness and past medical history.  Limited because of previous stroke and dementia.  Remainder of ROS is negative.      Vitals:   /75   Pulse 109   Temp 100 °F (37.8 °C)   Resp 26   SpO2 94%   I/O: No intake or output data in the 24 hours ending 12/09/21 2221  Exam:  Patient is examined using the personal protective equipment as per guidelines from infection control for this particular patient as enacted.  Hand washing was performed before and after patient interaction.  General Appearance:   Chronically ill-appearing female who does not appear to be in any acute distress   Head:    Normocephalic, without obvious abnormality, atraumatic   Eyes:    PERRL, conjunctivae/corneas clear, EOM's intact, both eyes   Ears:    Normal external ear canals, both ears   Nose:   Nares normal, septum midline, mucosa normal, no drainage    or sinus tenderness   Throat:   Lips, tongue, gums normal; oral mucosa pink and moist   Neck:   Supple, symmetrical, trachea midline, no adenopathy;     thyroid:  no enlargement/tenderness/nodules; no carotid    bruit or JVD   Back:     Symmetric, no curvature, ROM normal, no CVA tenderness   Lungs:     Clear to auscultation bilaterally, respirations unlabored   Chest Wall:    No tenderness or deformity    Heart:    Regular rate and rhythm, S1 and S2 normal, no murmur, rub   or gallop   Abdomen:     Soft, non-tender, bowel sounds active all four quadrants,      no masses, no hepatomegaly, no splenomegaly   Extremities:  Contractures of lower extremities.  Right upper arm AV fistula in place   Pulses:   Pulses palpable in all extremities; symmetric all extremities   Skin:   Skin color normal, Skin is warm and dry,  no rashes or palpable lesions   Neurologic:  Residual neurological deficit with left-sided weakness       Data Review:    I reviewed the patient's new clinical results.  Results from last 7 days   Lab Units 12/09/21  1646   WBC 10*3/mm3 6.10   HEMOGLOBIN g/dL 10.3*   PLATELETS 10*3/mm3 336     Results from last 7 days   Lab Units 12/09/21  1802   SODIUM mmol/L 137   POTASSIUM mmol/L 5.0   CHLORIDE mmol/L 97*   CO2 mmol/L 24.9   BUN mg/dL 50*   CREATININE mg/dL 5.79*   CALCIUM mg/dL 9.4   GLUCOSE mg/dL 165*     Microbiology Results (last 10 days)     Procedure Component Value - Date/Time    Respiratory Panel PCR w/COVID-19(SARS-CoV-2) CHAYITO/JESSIE/HIMANSHU/PAD/COR/MAD/KEMAR In-House, NP Swab in UTM/VTM, 3-4 HR TAT - Swab, Nasopharynx [[id number]]  (Abnormal) Collected: 12/09/21 1804    Lab Status: Final result Specimen: Swab from Nasopharynx Updated: 12/09/21 1911     ADENOVIRUS, PCR Not Detected     Coronavirus 229E Not Detected     Coronavirus HKU1 Not Detected     Coronavirus NL63 Not Detected     Coronavirus OC43 Not Detected     COVID19 Detected     Human Metapneumovirus Not Detected     Human Rhinovirus/Enterovirus Not Detected     Influenza A PCR Not Detected     Influenza B PCR Not Detected     Parainfluenza Virus 1 Not Detected     Parainfluenza Virus 2 Not Detected     Parainfluenza Virus 3 Not Detected     Parainfluenza Virus 4 Not Detected     RSV, PCR Not Detected     Bordetella pertussis pcr Not Detected     Bordetella parapertussis PCR Not Detected     Chlamydophila pneumoniae PCR Not Detected     Mycoplasma pneumo by PCR Not Detected    Narrative:      In the setting of a positive respiratory panel with a viral infection PLUS a negative procalcitonin without  other underlying concern for bacterial infection, consider observing off antibiotics or discontinuation of antibiotics and continue supportive care. If the respiratory panel is positive for atypical bacterial infection (Bordetella pertussis, Chlamydophila pneumoniae, or Mycoplasma pneumoniae), consider antibiotic de-escalation to target atypical bacterial infection.            Assessment:  Active Hospital Problems    Diagnosis  POA   • Pneumonia due to COVID-19 virus [U07.1, J12.82]  Yes      Resolved Hospital Problems   No resolved problems to display.         Plan:  See above  Jack Baird MD   12/9/2021  22:21 EST    Much of this encounter note is an electronic transcription/translation of spoken language to printed text. The electronic translation of spoken language may permit erroneous, or at times, nonsensical words or phrases to be inadvertently transcribed; Although I have reviewed the note for such errors, some may still exist

## 2021-12-10 NOTE — CONSULTS
[facility]  Cardiology Consult Note    Patient Identification:  Name: Carolina Bhat  Age: 60 y.o.  Sex: female  :  1960  MRN: [id number]             Requesting Physician: Dr Mary    Reason for Consultation / Chief Complaint: elevated troponin    History of Present Illness:   This is a 60-year-old female who is new to our service.  She comes into consult for elevated troponin.  She presented to [facility] with complaints of shortness of breath via EMS.  She presents to the nursing facility and per EMS report her sats were in the 70s.  She reportedly tested positive for COVID-19 prior to arrival to ER.  Apparently patient is on day 10 after becoming positive for COVID-19.  Shehas ESRD on HD  however she missed dialysis Wednesday.  Current medical diagnoses also include dementia, prior CVA with residual weakness and dysarthria, hyperlipidemia, seizure disorder, diabetes.While in the ER she went into SVT with heart rate of 174, she was cardioverted with adenosine 12 mg IV.  X-ray in ER showed bilateral interstitial and groundglass infiltrates consistent with bilateral pneumonia and/or pulmonary edema.  Troponin 1.210 today, was 0.464 yesterday, d-dimer 11.52, creatinine 6.12, CRP 44.52.    Echo from 2019 EF 69%, mild concentric hypertrophy, LV diastolic dysfunction grade 1.  Trace MV and TV regurg.  Mild cardiomegaly.      Comorbid cardiac risk factors: DM, ESRD, CAD, Hypertension, Hyperlipidemia    Past Medical History:  Past Medical History:   Diagnosis Date   • Acid reflux    • Anemia    • Anxiety    • Bacteremia    • Chest pain    • Chronic constipation    • Chronic pain    • Coronary artery disease    • CVA (cerebral vascular accident) ([facility]) ,    • Dementia ([facility])    • Depression    • DM (diabetes mellitus) type II controlled with renal manifestation ([facility])    • Dyslipidemia    • Dysphagia     G-tube removed    • Epilepsy ([facility])    •  ESRD (end stage renal disease) (H[facility]) 2016    Started HD 2016  [facility]   • Flexion contractures    • Hard to intubate     pt does not know   • History of esophagitis    • HLD (hyperlipidemia)    • Hypertension    • 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 (HCC) 08/09/2017   • Myocardial infarction (HCC)    • Nausea & vomiting    • Open angle glaucoma with borderline intraocular pressure, bilateral 08/09/2017   • Pseudobulbar affect    • Venous insufficiency (chronic) (peripheral)      Past Surgical History:  Past Surgical History:   Procedure Laterality Date   • ARTERIOVENOUS FISTULA/SHUNT SURGERY Right 3/21/2016    Procedure: RT BRACHIAL CEPHALIC FISTULA;  Surgeon: Alla Price Jr., MD;  Location: [facility] OR;  Service:    • BREAST BIOPSY        Allergies:  Allergies   Allergen Reactions   • Darvon [Propoxyphene] Confusion     Home Meds:  Medications Prior to Admission   Medication Sig Dispense Refill Last Dose   • atorvastatin (LIPITOR) 20 MG tablet Take 60 mg by mouth Every Night.   12/9/2021 at Unknown time   • carvedilol (COREG) 12.5 MG tablet Take 0.5 tablets by mouth 2 (Two) Times a Day With Meals. (Patient taking differently: Take 3.125 mg by mouth 2 (Two) Times a Day With Meals.)   12/9/2021 at Unknown time   • doxazosin (CARDURA) 4 MG tablet Take 0.5 tablets by mouth Daily.   12/9/2021 at Unknown time   • Ferric Citrate 1  MG(Fe) tablet Take 2 tablets by mouth 2 (Two) Times a Day Before Meals.   12/9/2021 at Unknown time   • HYDROcodone-acetaminophen (NORCO) 7.5-325 MG per tablet Take 1 tablet by mouth Every 6 (Six) Hours As Needed for Moderate Pain . 120 tablet 0 12/9/2021 at Unknown time   • insulin glargine (Semglee) 100 UNIT/ML injection Inject 12 Units under the skin into the appropriate area as directed Every Night. Inject 12 units at bedtime, hold for blood sugar less than 180   12/9/2021 at Unknown time   • Lactobacillus (FLORANEX PO) Take  1 tablet by mouth 2 (Two) Times a Day.   12/9/2021 at Unknown time   • levETIRAcetam (KEPPRA) 500 MG tablet Take 1 tablet by mouth Every 12 (Twelve) Hours. 60 tablet 0 12/9/2021 at Unknown time   • Lidocaine-Prilocaine, Bulk, 2-2 % cream Apply 2.5 % topically to the appropriate area as directed See Admin Instructions. Apply to dialysis access arm topically in the morning every MWF for dialysis   Past Week at Unknown time   • Lifitegrast (Xiidra) 5 % ophthalmic solution Administer 1 drop to both eyes 2 (Two) Times a Day.   12/9/2021 at Unknown time   • Multiple Vitamins-Minerals (MULTIVITAMIN WITH MINERALS) tablet tablet Take 1 tablet by mouth Daily.   12/9/2021 at Unknown time   • pantoprazole (PROTONIX) 40 MG EC tablet Take 40 mg by mouth Daily.   12/9/2021 at Unknown time   • silver sulfadiazine (SILVADENE, SSD) 1 % cream Apply 1 application topically to the appropriate area as directed 2 (Two) Times a Day. Left buttocks   12/9/2021 at Unknown time   • simethicone (MYLICON) 80 MG chewable tablet Chew 80 mg 3 (Three) Times a Day.   12/9/2021 at Unknown time   • traZODone (DESYREL) 100 MG tablet Take 100 mg by mouth Every Night.   12/9/2021 at Unknown time   • acetaminophen (TYLENOL) 500 MG tablet Take 500 mg by mouth Every 6 (Six) Hours As Needed for Mild Pain .      • bisacodyl (DULCOLAX) 10 MG suppository Insert 10 mg into the rectum Daily As Needed for Constipation.      • docusate sodium (COLACE) 250 MG capsule Take 250 mg by mouth daily.      • ferrous sulfate 325 (65 FE) MG tablet Take 325 mg by mouth 2 (two) times a day.      • insulin aspart (NOVOLOG FLEXPEN) 100 UNIT/ML solution pen-injector sc pen Inject  under the skin into the appropriate area as directed 3 (Three) Times a Day With Meals. Per SS: 200-249=2, 250-299=4, 300-349=6, 350-399=8, 400+=10      • ondansetron (ZOFRAN) 4 MG tablet Take 2 mg by mouth Every 8 (Eight) Hours As Needed for Nausea or Vomiting.   Unknown at Unknown time   • sevelamer  (RENVELA) 800 MG tablet Take 800 mg by mouth 3 (Three) Times a Day With Meals.        Current Meds:     Social History:   Social History     Tobacco Use   • Smoking status: Former Smoker   • Smokeless tobacco: Former User   Substance Use Topics   • Alcohol use: No      Family History:  History reviewed. No pertinent family history.     Review of Systems    Constitutional: No weakness,fatigue, fever, rigors, chills   Eyes: No vision changes, eye pain   ENT/oropharynx: No difficulty swallowing, sore throat, epistaxis, changes in hearing   Cardiovascular: No chest pain, chest tightness, palpitations, paroxysmal nocturnal dyspnea, orthopnea, diaphoresis, dizziness / syncopal episode   Respiratory: No shortness of breath, dyspnea on exertion, cough, wheezing hemoptysis   Gastrointestinal: No abdominal pain, nausea, vomiting, diarrhea, bloody stools   Genitourinary: No hematuria, dysuria   Neurological: No headache, tremors, numbness,  one-sided weakness    Musculoskeletal: No cramps, myalgias,  joint pain, joint swelling   Integument: No rash, edema           Constitutional:  Temp:  [97.3 °F (36.3 °C)-100 °F (37.8 °C)] 98.9 °F (37.2 °C)  Heart Rate:  [] 78  Resp:  [20-26] 20  BP: (105-142)/(62-97) 126/97    Physical Exam   General:  Appears in no acute distress, resting in bed  Eyes: EOMI no conjunctival drainage  HEENT:  No JVD. Thyroid not visibly enlarged. No mucosal pallor or cyanosis  Respiratory: Respirations regular and unlabored at rest. BBS with good air entry in all fields. No crackles, rubs or wheezes auscultated  Cardiovascular: S1S2 Regular rate and rhythm. No murmur, rub or gallop auscultated. No carotid bruits. DP/PT pulses 2+. No pretibial pitting edema  Gastrointestinal: Abdomen soft, flat, non tender. Bowel sounds present. No hepatosplenomegaly. No ascites  Musculoskeletal: Weakness. No abnormal movements  Extremities: No digital clubbing or cyanosis  Skin: Color pink. Skin warm and dry to touch.  No rashes  No xanthoma  Neuro: AAO x3 CN II-XII grossly intact              Cardiographics  ECG:              Telemetry:    Echocardiogram:   2019  Interpretation Summary    · Technically limited study  · Left ventricular systolic function is normal. Calculated EF = 69.0%. Estimated EF was in agreement with the calculated EF. Estimated EF = 69%. Normal left ventricular cavity size noted. All left ventricular wall segments contract normally. Left ventricular wall thickness is consistent with mild concentric hypertrophy. Left ventricular diastolic dysfunction is noted (grade I) consistent with impaired relaxation.  · Trace mitral valve regurgitation is present.  · Trace tricuspid valve regurgitation is present. Estimated right ventricular systolic pressure from tricuspid regurgitation is normal (<35 mmHg).      Imaging  Chest X-ray:   IMPRESSION:  There is evidence of bilateral interstitial and ground-glass  infiltrates consistent with bilateral pneumonia and/or pulmonary edema.  Mild cardiomegaly is noted.     This report was finalized on 12/9/2021 9:04 PM by Dr. Nacho Chong M.D.  Lab Review   Results from last 7 days   Lab Units 12/10/21  0456 12/09/21  1646   TROPONIN T ng/mL 1.210* 0.464*         Results from last 7 days   Lab Units 12/10/21  0456   SODIUM mmol/L 135*   POTASSIUM mmol/L 4.2   BUN mg/dL 58*   CREATININE mg/dL 6.12*   CALCIUM mg/dL 9.3     @[id number]@  Results from last 7 days   Lab Units 12/10/21  0456 12/09/21  1646   WBC 10*3/mm3 6.55 6.10   HEMOGLOBIN g/dL 8.7* 10.3*   HEMATOCRIT % 25.7* 32.0*   PLATELETS 10*3/mm3 265 336             Assessment:  Pneumonia due to COVID-19  Acute respiratory failure with hypoxia  Volume overload  ESRD on dialysis  Elevated troponin  DM type II  Anemia  Seizure disorder  CAD  Hypertension    Recommendations / Plan:     This is a 60-year-old female who is known to our service.  She was in a consult for elevated troponin.  She is admitted for COVID-19  pneumonia with hypoxia.  She presented to [facility] with complaints of shortness of breath.  She reportedly is on day 10 for COVID-19 infection.  She missed dialysis Wednesday.  She went into SVT yesterday with heart rate of 174 while in the ER and was cardioverted with adenosine.  Troponin yesterday 0.464, today 1.210, d-dimer 11.52, creatinine 6.12, CRP 44.52.    She denies any chest pain. No acute ECG changes, Continue beta-blockade.  Trend troponins.  She will need echo when clear from isolation.     Labs/tests ordered for am: trop    Isabel Kwon, APRN  12/10/2021, 13:45 EST      EMR Dragon/Transcription:   Dictated utilizing Dragon dictation

## 2021-12-10 NOTE — H&P
HISTORY AND PHYSICAL   [facility], [facility]      12/10/2021    Patient Identification:    Name: Carolina Bhat  Age: 60 y.o.  Sex: female  :  1960  MRN: [id number]                       Primary Care Physician: Provider, No Known    Chief Complaint:      Chief Complaint   Patient presents with   • Exposure To Known Illness         History of Present Illness:     Patient is a 60-year-old female, resident at a nursing home.  Patient has history of coronary artery disease, stroke, vascular dementia, diabetes mellitus type 2, epilepsy, end-stage renal disease on dialysis, hyperlipidemia, hypertension.  Patient became positive for COVID-19 infection on  this year.  She is on day 10 after becoming positive she received Regen-con on December 3.  She has been having dialysis 3 times a day.  Patient has been monitored at the nursing home and was stable until the day of admission when her saturation dropped into the low 70s on room air, it went up to the low 90s with oxygen.  Patient was brought to the emergency room due to hypoxia and respiratory distress.  In the ER, patient was found to have: Fever, tachypnea, tachycardia, troponin 0 0.464, procalcitonin 6.38, BNP 11,613, WBC 6.1, hemoglobin 10.3.  A1c was 5.7, creatinine 5.7, COVID-19 was detected.  X-ray showed bilateral interstitial and groundglass infiltrates consistent with bilateral pneumonia and or pulmonary edema.  Patient was given dexamethasone in the emergency room, in the emergency room patient developed SVT which responded with adenosine.  Patient was admitted for further management.    Past Medical History:  Past Medical History:   Diagnosis Date   • Acid reflux    • Anemia    • Anxiety    • Bacteremia    • Chest pain    • Chronic constipation    • Chronic pain    • Coronary artery disease    • CVA (cerebral vascular accident) ([facility]) ,    • Dementia ([facility])    • Depression    • DM (diabetes mellitus) type II  controlled with renal manifestation (HCC)    • Dyslipidemia    • Dysphagia     G-tube removed 2015   • Epilepsy (HCC)    • ESRD (end stage renal disease) (HCC) 2016    Started HD 2016  [facility]   • Flexion contractures    • Hard to intubate     pt does not know   • History of esophagitis    • HLD (hyperlipidemia)    • Hypertension    • 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 (HCC) 08/09/2017   • Myocardial infarction (HCC)    • Nausea & vomiting    • Open angle glaucoma with borderline intraocular pressure, bilateral 08/09/2017   • Pseudobulbar affect    • Venous insufficiency (chronic) (peripheral)      Past Surgical History:  Past Surgical History:   Procedure Laterality Date   • ARTERIOVENOUS FISTULA/SHUNT SURGERY Right 3/21/2016    Procedure: RT BRACHIAL CEPHALIC FISTULA;  Surgeon: Alla Price Jr., MD;  Location: [facility];  Service:    • BREAST BIOPSY        Home Meds:  Medications Prior to Admission   Medication Sig Dispense Refill Last Dose   • atorvastatin (LIPITOR) 20 MG tablet Take 60 mg by mouth Every Night.   12/9/2021 at Unknown time   • carvedilol (COREG) 12.5 MG tablet Take 0.5 tablets by mouth 2 (Two) Times a Day With Meals. (Patient taking differently: Take 3.125 mg by mouth 2 (Two) Times a Day With Meals.)   12/9/2021 at Unknown time   • doxazosin (CARDURA) 4 MG tablet Take 0.5 tablets by mouth Daily.   12/9/2021 at Unknown time   • Ferric Citrate 1  MG(Fe) tablet Take 2 tablets by mouth 2 (Two) Times a Day Before Meals.   12/9/2021 at Unknown time   • HYDROcodone-acetaminophen (NORCO) 7.5-325 MG per tablet Take 1 tablet by mouth Every 6 (Six) Hours As Needed for Moderate Pain . 120 tablet 0 12/9/2021 at Unknown time   • insulin glargine (Semglee) 100 UNIT/ML injection Inject 12 Units under the skin into the appropriate area as directed Every Night. Inject 12 units at bedtime, hold for blood sugar less than 180   12/9/2021 at Unknown  time   • Lactobacillus (FLORANEX PO) Take 1 tablet by mouth 2 (Two) Times a Day.   12/9/2021 at Unknown time   • levETIRAcetam (KEPPRA) 500 MG tablet Take 1 tablet by mouth Every 12 (Twelve) Hours. 60 tablet 0 12/9/2021 at Unknown time   • Lidocaine-Prilocaine, Bulk, 2-2 % cream Apply 2.5 % topically to the appropriate area as directed See Admin Instructions. Apply to dialysis access arm topically in the morning every MWF for dialysis   Past Week at Unknown time   • Lifitegrast (Xiidra) 5 % ophthalmic solution Administer 1 drop to both eyes 2 (Two) Times a Day.   12/9/2021 at Unknown time   • Multiple Vitamins-Minerals (MULTIVITAMIN WITH MINERALS) tablet tablet Take 1 tablet by mouth Daily.   12/9/2021 at Unknown time   • pantoprazole (PROTONIX) 40 MG EC tablet Take 40 mg by mouth Daily.   12/9/2021 at Unknown time   • silver sulfadiazine (SILVADENE, SSD) 1 % cream Apply 1 application topically to the appropriate area as directed 2 (Two) Times a Day. Left buttocks   12/9/2021 at Unknown time   • simethicone (MYLICON) 80 MG chewable tablet Chew 80 mg 3 (Three) Times a Day.   12/9/2021 at Unknown time   • traZODone (DESYREL) 100 MG tablet Take 100 mg by mouth Every Night.   12/9/2021 at Unknown time   • acetaminophen (TYLENOL) 500 MG tablet Take 500 mg by mouth Every 6 (Six) Hours As Needed for Mild Pain .      • bisacodyl (DULCOLAX) 10 MG suppository Insert 10 mg into the rectum Daily As Needed for Constipation.      • docusate sodium (COLACE) 250 MG capsule Take 250 mg by mouth daily.      • ferrous sulfate 325 (65 FE) MG tablet Take 325 mg by mouth 2 (two) times a day.      • insulin aspart (NOVOLOG FLEXPEN) 100 UNIT/ML solution pen-injector sc pen Inject  under the skin into the appropriate area as directed 3 (Three) Times a Day With Meals. Per SS: 200-249=2, 250-299=4, 300-349=6, 350-399=8, 400+=10      • ondansetron (ZOFRAN) 4 MG tablet Take 2 mg by mouth Every 8 (Eight) Hours As Needed for Nausea or Vomiting.    "Unknown at Unknown time   • sevelamer (RENVELA) 800 MG tablet Take 800 mg by mouth 3 (Three) Times a Day With Meals.          Allergies:  Allergies   Allergen Reactions   • Darvon [Propoxyphene] Confusion     Immunizations:  Immunization History   Administered Date(s) Administered   • Flu Vaccine Quad PF >18YRS 2017   • Flu Vaccine Quad PF >36MO 2016, 10/04/2021   • Pneumococcal Conjugate 13-Valent (PCV13) 2016   • Pneumococcal Polysaccharide (PPSV23) 2016, 2017     Social History:   Social History     Social History Narrative   • Not on file     Social History     Tobacco Use   • Smoking status: Former Smoker   • Smokeless tobacco: Former User   Substance Use Topics   • Alcohol use: No     Family History:  History reviewed. No pertinent family history.     Review of Systems  See history of present illness and past medical history.    Unable to complete review of systems due to patient's dementia       Objective:    Exam:    T MAX 24 hrs: Temp (24hrs), Av.7 °F (37.1 °C), Min:97.3 °F (36.3 °C), Max:100 °F (37.8 °C)    Vitals Ranges:   Temp:  [97.3 °F (36.3 °C)-100 °F (37.8 °C)] 98.9 °F (37.2 °C)  Heart Rate:  [] 78  Resp:  [20-26] 20  BP: (105-142)/(62-97) 126/97    /97   Pulse 78   Temp 98.9 °F (37.2 °C) (Oral)   Resp 20   Ht 177 cm (69.69\")   Wt 85.3 kg (188 lb)   SpO2 100%   BMI 27.22 kg/m²     General: Alert, oriented x 1-2 . Cooperative, no distress, appears stated age  HEENT:    Head: Normocephalic, without obvious abnormality, atraumatic  Eyes: EOM are normal. Pupils are equal  Oropharynx: Mucosa and tongue moist  Neck: Supple, symmetrical, trachea midline, no adenopathy;              thyroid:  no enlargement/tenderness/nodules;              no carotid bruit or JVD  Cardiovascular: Normal rate, regular rhythm and intact distal pulses.              Exam reveals no gallop and no friction rub. No murmur heard  Chest wall: No tenderness or deformity  Pulmonary: " Clear to auscultation bilaterally, respirations unlabored.               No rhonchi, wheezing or rales.   Abdominal: Soft, nontender, bowel sounds active all four quadrants,     no masses, no hepatomegaly, no splenomegaly.   Extremities: Left hand contraction, peripheral pulses diminished but present  Pulses: 2 + symmetric all extremities  Neurological: Patient is alert and oriented to person, place.  No new focal or sensory deficit.  Skin: Skin color, texture, normal. Turgor is decreased. No rashes or lesions      Data Review:    Results from last 7 days   Lab Units 12/10/21  0456 12/09/21  1646   WBC 10*3/mm3 6.55 6.10   HEMOGLOBIN g/dL 8.7* 10.3*   HEMATOCRIT % 25.7* 32.0*   PLATELETS 10*3/mm3 265 336       Results from last 7 days   Lab Units 12/10/21  0456 12/09/21  1802   SODIUM mmol/L 135* 137   POTASSIUM mmol/L 4.2 5.0   CHLORIDE mmol/L 97* 97*   CO2 mmol/L 19.3* 24.9   BUN mg/dL 58* 50*   CREATININE mg/dL 6.12* 5.79*   CALCIUM mg/dL 9.3 9.4   BILIRUBIN mg/dL 0.4 0.4   ALK PHOS U/L 77 80   ALT (SGPT) U/L 26 31   AST (SGOT) U/L 73* 75*   GLUCOSE mg/dL 197* 165*           Results from last 7 days   Lab Units 12/09/21  1646   HEMOGLOBIN A1C % 5.70*       Lab Results   Lab Value Date/Time    TROPONINT 1.210 (C) 12/10/2021 0456    TROPONINT 0.464 (C) 12/09/2021 1646    TROPONINT 0.037 (C) 02/17/2021 1435    TROPONINT 0.033 (C) 02/17/2021 1216    TROPONINT 0.045 (H) 01/22/2019 1255    TROPONINT 0.052 (H) 01/22/2019 0357    TROPONINT 0.051 (H) 01/21/2019 1108    TROPONINT 0.073 (H) 06/13/2016 0640    TROPONINT 0.02 09/30/2015 0258       Brief Urine Lab Results     None           Imaging Results (All)     Procedure Component Value Units Date/Time    XR Chest 1 View [[id number]] Collected: 12/09/21 1728     Updated: 12/09/21 2107    Narrative:      EXAMINATION: SINGLE VIEW CHEST RADIOGRAPH     HISTORY: 60-year-old female with a history of Covid and shortness of  air.     FINDINGS: An AP portable chest radiograph was  obtained. Comparison is  made to a chest radiograph dated 02/17/2021. There is hazy opacification  seen throughout both lungs with interstitial opacification in both  lungs, left worse than right. The cardiac silhouette is mildly enlarged.  The visualized osseous structures appear normal.       Impression:      There is evidence of bilateral interstitial and ground-glass  infiltrates consistent with bilateral pneumonia and/or pulmonary edema.  Mild cardiomegaly is noted.     This report was finalized on 12/9/2021 9:04 PM by Dr. Nacho Chong M.D.             Assessment:      Pneumonia due to COVID-19 virus  Acute respiratory failure with hypoxia  Volume overload  End-stage renal disease on dialysis  Elevated troponin.  Diabetes mellitus 2  Anemia, chronic kidney disease  Seizure disorder  Coronary artery disease  Hypertension      Plan:    Inpatient admission  Renal consult requested,  patient needs hemodialysis.  ID consult, patient may benefit from remdesivir  Start 6 mg dexamethasone every day, may need it twice a day.  Patient is on day 10 after becoming positive for COVID-19 infection.  We will watch for superimposed infection.  Cardiology consult for elevated troponin, It doubled since yesterday  Monitor and correct electrolytes  Accu-Chek and SSI  Monitor mental status, a little confused.  Not at baseline.  Home medications  DVT/stress ulcer prophylaxis  Labs in am        Rhys Mary MD  12/10/2021         I wore protective equipment throughout this patient encounter including a face mask, gloves, and protective eyewear.  Hand hygiene was performed before donning protective equipment and after removal when leaving the room.

## 2021-12-10 NOTE — CASE MANAGEMENT/SOCIAL WORK
Continued Stay Note  [facility]     Patient Name: Carolina Bhat  MRN: [id number]  Today's Date: 12/10/2021    Admit Date: 12/9/2021     Discharge Plan     Row Name 12/10/21 1338       Plan    Plan Pending    Plan Comments Due to patient orientation (confused), CCP attempted to screen patient’s daughter by calling her over the phone; she did not answer. CCP left VMM for patient’s daughter Ida Bhat to screen and discuss d/c planning. According to ED, patient came from [facility]. CCP left VMM for [facility] to find out what level of care and if she can return. TERESITA Wilhelm               Discharge Codes    No documentation.                     TERESITA ESPINOSA

## 2021-12-11 NOTE — PROGRESS NOTES
"  Infectious Diseases Progress Note    Jack Baird MD     [facility]  [place]: 1 day  Patient Identification:  Name: Carolina Bhat  Age: 60 y.o.  Sex: female  :  1960  MRN: [id number]         Primary Care Physician: Provider, No Known            Subjective: Feeling better breathing better  Interval History: See consultation note    Objective:    Scheduled Meds:atorvastatin, 60 mg, Oral, Nightly  carvedilol, 6.25 mg, Oral, BID With Meals  dexamethasone, 6 mg, Intravenous, Q24H  famotidine, 20 mg, Intravenous, Q12H  heparin (porcine), 5,000 Units, Subcutaneous, Q12H  insulin lispro, 0-7 Units, Subcutaneous, TID AC  levETIRAcetam, 500 mg, Intravenous, Q12H  [START ON 2021] remdesivir, 100 mg, Intravenous, Q24H  sevelamer, 800 mg, Oral, TID With Meals  sodium hypochlorite, , Topical, Q12H      Continuous Infusions:     Vital signs in last 24 hours:  Temp:  [96.8 °F (36 °C)-98.9 °F (37.2 °C)] 97.7 °F (36.5 °C)  Heart Rate:  [] 83  Resp:  [20] 20  BP: ()/(62-97) 107/65    Intake/Output:  No intake or output data in the 24 hours ending 12/10/21 2140    Exam:  /65 (BP Location: Left arm, Patient Position: Lying)   Pulse 83   Temp 97.7 °F (36.5 °C) (Oral)   Resp 20   Ht 177 cm (69.69\")   Wt 85.3 kg (188 lb)   SpO2 94%   BMI 27.22 kg/m²   Patient is examined using the personal protective equipment as per guidelines from infection control for this particular patient as enacted.  Hand washing was performed before and after patient interaction.  General Appearance:   Chronically ill-appearing female who does not appear to be in any acute distress   Head:    Normocephalic, without obvious abnormality, atraumatic   Eyes:    PERRL, conjunctivae/corneas clear, EOM's intact, both eyes   Ears:    Normal external ear canals, both ears   Nose:   Nares normal, septum midline, mucosa normal, no drainage    or sinus tenderness   Throat:   Lips, tongue, gums normal; oral mucosa pink " and moist   Neck:   Supple, symmetrical, trachea midline, no adenopathy;     thyroid:  no enlargement/tenderness/nodules; no carotid    bruit or JVD   Back:     Symmetric, no curvature, ROM normal, no CVA tenderness   Lungs:     Clear to auscultation bilaterally, respirations unlabored   Chest Wall:    No tenderness or deformity    Heart:    Regular rate and rhythm, S1 and S2 normal, no murmur, rub   or gallop   Abdomen:     Soft, non-tender, bowel sounds active all four quadrants,     no masses, no hepatomegaly, no splenomegaly   Extremities:  Contractures of lower extremities.  Right upper arm AV fistula in place   Pulses:   Pulses palpable in all extremities; symmetric all extremities   Skin:   Skin color normal, Skin is warm and dry,  no rashes or palpable lesions   Neurologic:  Residual neurological deficit with left-sided weakness            Data Review:    I reviewed the patient's new clinical results.  Results from last 7 days   Lab Units 12/10/21  0456 12/09/21  1646   WBC 10*3/mm3 6.55 6.10   HEMOGLOBIN g/dL 8.7* 10.3*   PLATELETS 10*3/mm3 265 336     Results from last 7 days   Lab Units 12/10/21  0456 12/09/21  1802   SODIUM mmol/L 135* 137   POTASSIUM mmol/L 4.2 5.0   CHLORIDE mmol/L 97* 97*   CO2 mmol/L 19.3* 24.9   BUN mg/dL 58* 50*   CREATININE mg/dL 6.12* 5.79*   CALCIUM mg/dL 9.3 9.4   GLUCOSE mg/dL 197* 165*         Assessment:    Pneumonia due to COVID-19 virus  1-systemic COVID-19 infection with  2-COVID-19 pneumonia with hypoxia further confounded by  3-possible volume overload in the setting of end-stage renal disease on hemodialysis  4-history of CVA  5-vascular dementia  6-diabetes mellitus  7-immobilization syndrome        Recommendations/Discussions:  · Continue supportive care  · Management of end-stage renal disease for primary team and nephrology service  · Continuedexamethasone and remdesivir with close monitoring of her clinical course in terms of need for oxygen supplementation and  sense of wellbeing.  Patient would benefit from aggressive volume management and dialysis as scheduled prevent volume overload.  Follow-up on her inflammatory markers.  · If she shows decline in her respiratory status and needing oxygen supplementation with devices such as high flow nasal cannula or positive pressure ventilation then she may be a candidate for Actemra or baricitinib depending on the timeframe in which this deterioration occurs.    · Conversely if she continues to improve and maintain oxygen saturation above 94% on room air remdesivir may not have to be continued.    Jack Baird MD  12/10/2021  13:40  Much of this encounter note is an electronic transcription/translation of spoken language to printed text. The electronic translation of spoken language may permit erroneous, or at times, nonsensical words or phrases to be inadvertently transcribed; Although I have reviewed the note for such errors, some may still exist

## 2021-12-11 NOTE — PROGRESS NOTES
HOSPITAL FOLLOW UP NOTE    Patient Name: Carolina Bhat  Patient : 1960        Date of Service:21  Provider of Service: Tone Rivero MD  Place of Service: [facility]  Referral Provider: Rhys Mary MD          Follow Up: elevated troponin, SVT    Interval Hx:no angina.  No further SVT        OBJECTIVE  Temp:  [96 °F (35.6 °C)-98.9 °F (37.2 °C)] 96 °F (35.6 °C)  Heart Rate:  [74-97] 97  Resp:  [16-20] 16  BP: ()/(61-97) 131/80     Intake/Output Summary (Last 24 hours) at 2021 1103  Last data filed at 2021 0535  Gross per 24 hour   Intake 100 ml   Output 2000 ml   Net -1900 ml     Body mass index is 27.22 kg/m².      21  2320   Weight: 85.3 kg (188 lb)         Physical Exam:   Vitals reviewed.   Constitutional:       Appearance: Healthy appearance. Well-developed and not in distress.   HENT:      Head: Normocephalic.   Neck:      Thyroid: No thyromegaly.      Vascular: No carotid bruit or JVD.   Pulmonary:      Effort: Pulmonary effort is normal.      Breath sounds: Normal breath sounds.   Cardiovascular:      Normal rate. Regular rhythm. Normal S1. Normal S2.      Murmurs: There is no murmur.      No gallop.   Pulses:     Intact distal pulses.   Edema:     Peripheral edema absent.   Musculoskeletal:      Cervical back: Normal range of motion. Skin:     General: Skin is warm and dry.      Findings: No erythema.   Neurological:      Mental Status: Alert.           CURRENT MEDS    Scheduled Meds:atorvastatin, 60 mg, Oral, Nightly  carvedilol, 6.25 mg, Oral, BID With Meals  dexamethasone, 6 mg, Intravenous, Q24H  famotidine, 20 mg, Intravenous, Q12H  heparin (porcine), 5,000 Units, Subcutaneous, Q12H  insulin lispro, 0-7 Units, Subcutaneous, TID AC  levETIRAcetam, 500 mg, Intravenous, Q12H  remdesivir, 100 mg, Intravenous, Q24H  sevelamer, 800 mg, Oral, TID With Meals  sodium hypochlorite, , Topical, Q12H      Continuous Infusions:       Lab  Review:   Results from last 7 days   Lab Units 12/10/21  0456 12/09/21  1802   SODIUM mmol/L 135* 137   POTASSIUM mmol/L 4.2 5.0   CHLORIDE mmol/L 97* 97*   CO2 mmol/L 19.3* 24.9   BUN mg/dL 58* 50*   CREATININE mg/dL 6.12* 5.79*   GLUCOSE mg/dL 197* 165*   CALCIUM mg/dL 9.3 9.4   AST (SGOT) U/L 73* 75*   ALT (SGPT) U/L 26 31         Results from last 7 days   Lab Units 12/10/21  0456 12/09/21  1646   WBC 10*3/mm3 6.55 6.10   HEMOGLOBIN g/dL 8.7* 10.3*   HEMATOCRIT % 25.7* 32.0*   PLATELETS 10*3/mm3 265 336                 Results from last 7 days   Lab Units 12/09/21  1646   PROBNP pg/mL 11,613.0*           I personally reviewed the patient's ECG and telemetry data    ASSESSMENT & PLAN    Pneumonia due to COVID-19 virus    1. Pneumonia  2. Elevated troponin: multifactorial.  In part may be Type II demand   3. CKD: on dialysis  4. SVT: no recurrence  5. Diabetes      Continue to monitor    Tone Rivero MD  12/11/21

## 2021-12-11 NOTE — PROGRESS NOTES
[facility] Progress Note      Patient Name: Carolina Bhat  : 1960  MRN: [id number]  Primary Care Physician:  Provider, No Known  Date of admission: 2021    Subjective     Interval History:   Doing well overall denies any nausea no vomiting.  Finished dialysis this morning with 2 L of UF with no reported problems.    Review of Systems:   As noted above    Objective     Vitals:   Temp:  [96 °F (35.6 °C)-98 °F (36.7 °C)] 96 °F (35.6 °C)  Heart Rate:  [74-97] 91  Resp:  [16-20] 16  BP: ()/(61-87) 134/87  Flow (L/min):  [8-10] 8    Intake/Output Summary (Last 24 hours) at 2021 1405  Last data filed at 2021 1241  Gross per 24 hour   Intake 500 ml   Output 2000 ml   Net -1500 ml       Physical Exam:      Constitutional: Awake difficult to understand has expressive aphasia.  Responds to basic question  HEENT: Sclera anicteric, no conjunctival injection  Neck: Supple, no thyromegaly, no lymphadenopathy, trachea at midline, no JVD  Respiratory: Bilateral basal crackles  Cardiovascular: RRR, no murmurs, no rubs or gallops, no carotid bruit  Gastrointestinal: Positive bowel sounds, abdomen is soft, nontender and nondistended  : No palpable bladder  Musculoskeletal: No edema, no clubbing or cyanosis  Psychiatric: Appropriate affect, cooperative  Neurologic: Left-sided weakness  Access: Right upper extremity AV fistula with good thrill    Scheduled Meds:     atorvastatin, 60 mg, Oral, Nightly  carvedilol, 6.25 mg, Oral, BID With Meals  dexamethasone, 6 mg, Intravenous, Q24H  famotidine, 20 mg, Intravenous, Q12H  heparin (porcine), 5,000 Units, Subcutaneous, Q12H  insulin lispro, 0-7 Units, Subcutaneous, TID AC  levETIRAcetam, 500 mg, Intravenous, Q12H  remdesivir, 100 mg, Intravenous, Q24H  sevelamer, 800 mg, Oral, TID With Meals  sodium hypochlorite, , Topical, Q12H      IV Meds:        Results Reviewed:   I have personally reviewed the results from the time  of this admission to 12/11/2021 14:05 EST     Results from last 7 days   Lab Units 12/11/21  1159 12/10/21  0456 12/09/21  1802   SODIUM mmol/L 139 135* 137   POTASSIUM mmol/L 3.5 4.2 5.0   CHLORIDE mmol/L 97* 97* 97*   CO2 mmol/L 26.2 19.3* 24.9   BUN mg/dL 32* 58* 50*   CREATININE mg/dL 3.67* 6.12* 5.79*   CALCIUM mg/dL 9.5 9.3 9.4   BILIRUBIN mg/dL 0.4 0.4 0.4   ALK PHOS U/L 88 77 80   ALT (SGPT) U/L 26 26 31   AST (SGOT) U/L 56* 73* 75*   GLUCOSE mg/dL 156* 197* 165*       Estimated Creatinine Clearance: 19 mL/min (A) (by C-G formula based on SCr of 3.67 mg/dL (H)).    Results from last 7 days   Lab Units 12/11/21  1159   PHOSPHORUS mg/dL 2.6             Results from last 7 days   Lab Units 12/11/21  1159 12/10/21  0456 12/09/21  1646   WBC 10*3/mm3 7.99 6.55 6.10   HEMOGLOBIN g/dL 9.1* 8.7* 10.3*   PLATELETS 10*3/mm3 296 265 336             Assessment / Plan     ASSESSMENT:  1.  End-stage renal disease on maintenance in center hemodialysis on a Monday Wednesday Friday schedule.  Last dialyzed this am.    2.  Hypertension with end-stage renal disease: Blood pressure controlled.  3.  Acute hypoxemic respiratory failure secondary to COVID-19 pneumonia versus volume overload.  4.  Hyperphosphatemia on binders  5.  COVID-19 pneumonia.  6.  History of CVA  7.Diabetes mellitus type 2 diet controlled now.  8.  Anemia: Likely secondary to end-stage renal disease.       Plan:  Continue dialysis per schedule.  Next tentative dialysis scheduled for Monday.  Surveillance labs    Thank you for involving us in the care of Carolina Bhat.  Please feel free to call with any questions.    Joshua Goodson MD  12/11/21  14:05 EST    [facility]""  [phone]      Much of this encounter note is an electronic transcription/translation of spoken language to printed text. The electronic translation of spoken language may permit erroneous, or at times, nonsensical words or phrases to be inadvertently  transcribed; Although I have reviewed the note for such errors, some may still exist.

## 2021-12-11 NOTE — PLAN OF CARE
Pt. VS WNL.  C/o pain in her buttock, relieved by PO pain meds.  Pt. Increased to 9L [place], sats wnl.  Pt. Receiving IV decadron and Remdesivir.  Pt. To have HD.  This RN called HD  line at 1700, HD RN states it will be before midnight tonight.  Pt. Q2h turn.  Pt. With dressing changes to bottom.  Pt. Resting comfortably at present, will continue to monitor closely.      Problem: Adult Inpatient Plan of Care  Goal: Plan of Care Review  Outcome: Ongoing, Progressing  Flowsheets (Taken 12/10/2021 1920)  Progress: declining  Plan of Care Reviewed With: patient

## 2021-12-11 NOTE — NURSING NOTE
Notified cardio rn elena with troponin results, no new orders at this time, will continue to monitor

## 2021-12-11 NOTE — PLAN OF CARE
Goal Outcome Evaluation:  Plan of Care Reviewed With: patient        Progress: no change  Outcome Summary: VSS. Turned to keep off pressure wound on bottom, specialty bed ordered. Dialyis done with 2L removed. Tolerated ok. CTM

## 2021-12-11 NOTE — PROGRESS NOTES
DAILY PROGRESS NOTE  [facility], [facility]    2021    Patient Identification:  Name: Carolina Bhat  Age: 61 y.o.  Sex: female  :  1960  MRN: [id number]           Primary Care Physician: Provider, No Known    Subjective:    Interval History:    Patient is complaining of pain in her buttocks were she has a decubitus wound.  Otherwise, has incoherent yelling and screaming.  Pain appears to be controlled.  Patient is day 11 after becoming positive for COVID-19 infection.  Appreciate cardiology input.  Hemodialysis per renal.  Vital signs stable, saturation in 8 L is 94%.    ROS:     No nausea, vomiting, diarrhea, constipation, chest pain    Objective:    Scheduled Meds:  atorvastatin, 60 mg, Oral, Nightly  carvedilol, 6.25 mg, Oral, BID With Meals  dexamethasone, 6 mg, Intravenous, Q24H  famotidine, 20 mg, Intravenous, Q12H  heparin (porcine), 5,000 Units, Subcutaneous, Q12H  insulin lispro, 0-7 Units, Subcutaneous, TID AC  levETIRAcetam, 500 mg, Intravenous, Q12H  remdesivir, 100 mg, Intravenous, Q24H  sevelamer, 800 mg, Oral, TID With Meals  sodium hypochlorite, , Topical, Q12H        Continuous Infusions:       PRN Meds:  •  acetaminophen  •  albumin human  •  dextrose  •  dextrose  •  glucagon (human recombinant)  •  HYDROcodone-acetaminophen  •  ondansetron  •  [COMPLETED] Insert peripheral IV **AND** sodium chloride    Intake/Output:    Intake/Output Summary (Last 24 hours) at 2021 0959  Last data filed at 2021 0535  Gross per 24 hour   Intake 100 ml   Output 2000 ml   Net -1900 ml         Exam:    T MAX 24 hrs: Temp (24hrs), Av.5 °F (36.4 °C), Min:96 °F (35.6 °C), Max:98.9 °F (37.2 °C)    Vitals Ranges:   Temp:  [96 °F (35.6 °C)-98.9 °F (37.2 °C)] 96 °F (35.6 °C)  Heart Rate:  [74-97] 97  Resp:  [16-20] 16  BP: ()/(61-97) 131/80    /80 (BP Location: Left arm, Patient Position: Lying)   Pulse 97   Temp 96 °F (35.6 °C)  "(Oral)   Resp 16   Ht 177 cm (69.69\")   Wt 85.3 kg (188 lb)   SpO2 99%   BMI 27.22 kg/m²     General: Alert, oriented x1.  Cooperative.  Complaining of pain in her buttocks.    Neck: Supple, symmetrical, trachea midline, no adenopathy;              thyroid:  no enlargement/tenderness/nodules;              no carotid bruit or JVD  Cardiovascular: Normal rate, regular rhythm and intact distal pulses.              Exam reveals no gallop and no friction rub. No murmur heard  Pulmonary: Clear to auscultation bilaterally, respirations unlabored.               No rhonchi, wheezing or rales.   Abdominal: Soft. Soft, nontender, bowel sounds active all four quadrants,     no masses, no hepatomegaly, no splenomegaly.   Extremities: Left hand contraction.    Neurological: Patient is awake oriented x2.  No new focal sensorimotor deficit.    Skin: Wound in sacral area covered with dressings.        Data Review:    Results from last 7 days   Lab Units 12/10/21  0456 12/09/21  1646   WBC 10*3/mm3 6.55 6.10   HEMOGLOBIN g/dL 8.7* 10.3*   HEMATOCRIT % 25.7* 32.0*   PLATELETS 10*3/mm3 265 336       Results from last 7 days   Lab Units 12/10/21  0456 12/09/21  1802   SODIUM mmol/L 135* 137   POTASSIUM mmol/L 4.2 5.0   CHLORIDE mmol/L 97* 97*   CO2 mmol/L 19.3* 24.9   BUN mg/dL 58* 50*   CREATININE mg/dL 6.12* 5.79*   CALCIUM mg/dL 9.3 9.4   BILIRUBIN mg/dL 0.4 0.4   ALK PHOS U/L 77 80   ALT (SGPT) U/L 26 31   AST (SGOT) U/L 73* 75*   GLUCOSE mg/dL 197* 165*           Results from last 7 days   Lab Units 12/09/21  1646   HEMOGLOBIN A1C % 5.70*       Lab Results   Lab Value Date/Time    TROPONINT 1.210 (C) 12/10/2021 0456    TROPONINT 0.464 (C) 12/09/2021 1646    TROPONINT 0.037 (C) 02/17/2021 1435    TROPONINT 0.033 (C) 02/17/2021 1216    TROPONINT 0.045 (H) 01/22/2019 1255    TROPONINT 0.052 (H) 01/22/2019 0357    TROPONINT 0.051 (H) 01/21/2019 1108    TROPONINT 0.073 (H) 06/13/2016 0640    TROPONINT 0.02 09/30/2015 0258 "       Microbiology Results (last 10 days)     Procedure Component Value - Date/Time    Blood Culture - Blood, Arm, Right [[id number]]  (Normal) Collected: 12/09/21 2032    Lab Status: Preliminary result Specimen: Blood from Arm, Right Updated: 12/10/21 2045     Blood Culture No growth at 24 hours    Blood Culture - Blood, Arm, Left [[id number]]  (Normal) Collected: 12/09/21 1818    Lab Status: Preliminary result Specimen: Blood from Arm, Left Updated: 12/10/21 1830     Blood Culture No growth at 24 hours    Respiratory Panel PCR w/COVID-19(SARS-CoV-2) CHAYITO/JESSIE/HIMANSHU/PAD/COR/MAD/KEMAR In-House, NP Swab in UTM/VTM, 3-4 HR TAT - Swab, Nasopharynx [[id number]]  (Abnormal) Collected: 12/09/21 1804    Lab Status: Final result Specimen: Swab from Nasopharynx Updated: 12/09/21 1911     ADENOVIRUS, PCR Not Detected     Coronavirus 229E Not Detected     Coronavirus HKU1 Not Detected     Coronavirus NL63 Not Detected     Coronavirus OC43 Not Detected     COVID19 Detected     Human Metapneumovirus Not Detected     Human Rhinovirus/Enterovirus Not Detected     Influenza A PCR Not Detected     Influenza B PCR Not Detected     Parainfluenza Virus 1 Not Detected     Parainfluenza Virus 2 Not Detected     Parainfluenza Virus 3 Not Detected     Parainfluenza Virus 4 Not Detected     RSV, PCR Not Detected     Bordetella pertussis pcr Not Detected     Bordetella parapertussis PCR Not Detected     Chlamydophila pneumoniae PCR Not Detected     Mycoplasma pneumo by PCR Not Detected    Narrative:      In the setting of a positive respiratory panel with a viral infection PLUS a negative procalcitonin without other underlying concern for bacterial infection, consider observing off antibiotics or discontinuation of antibiotics and continue supportive care. If the respiratory panel is positive for atypical bacterial infection (Bordetella pertussis, Chlamydophila pneumoniae, or Mycoplasma pneumoniae), consider antibiotic de-escalation to target  atypical bacterial infection.           Imaging Results (Last 7 Days)     Procedure Component Value Units Date/Time    XR Chest 1 View [[id number]] Collected: 12/09/21 1728     Updated: 12/09/21 2107    Narrative:      EXAMINATION: SINGLE VIEW CHEST RADIOGRAPH     HISTORY: 60-year-old female with a history of Covid and shortness of  air.     FINDINGS: An AP portable chest radiograph was obtained. Comparison is  made to a chest radiograph dated 02/17/2021. There is hazy opacification  seen throughout both lungs with interstitial opacification in both  lungs, left worse than right. The cardiac silhouette is mildly enlarged.  The visualized osseous structures appear normal.       Impression:      There is evidence of bilateral interstitial and ground-glass  infiltrates consistent with bilateral pneumonia and/or pulmonary edema.  Mild cardiomegaly is noted.     This report was finalized on 12/9/2021 9:04 PM by Dr. Nacho Chnog M.D.               Assessment:        Pneumonia due to COVID-19 virus  Acute respiratory failure with hypoxia  Volume overload  End-stage renal disease on dialysis  Elevated troponin.  Diabetes mellitus 2  Anemia, chronic kidney disease  Seizure disorder  Coronary artery disease  Hypertension        Plan:    Patient underwent hemodialysis yesterday, to have dialysis again on Monday.  She appears to be feeling better, however, she is complaining of pain in her buttocks, severe.  I will adjust pain medications to be given every 4 hours.    Patient oxygen saturation goal will be >92 %, wean oxygen to around 92%  Continue supportive therapy for COVID-19 pneumonia.  In addition to remdesivir as well as dexamethasone.  Hemodialysis per renal  Appreciate cardiology input regarding elevated troponin.  Patient has no chest pain or shortness of breath at this time  Monitor and correct electrolytes  Adjust insulin as needed  Monitor mental status  Continue DVT/stress ulcer prophylaxis  Labs in  zaria Mary MD  12/11/2021  09:59 EST         I wore protective equipment throughout this patient encounter including a face mask, gloves, and protective eyewear.  Hand hygiene was performed before donning protective equipment and after removal when leaving the room.

## 2021-12-12 NOTE — PROGRESS NOTES
"  Infectious Diseases Progress Note    Jack Baird MD     [facility]  [place]: 2 days  Patient Identification:  Name: Carolina Bhat  Age: 61 y.o.  Sex: female  :  1960  MRN: [id number]         Primary Care Physician: Provider, No Known            Subjective: No new complaints.  Interval History: See consultation note  Oxygen requirement seems to be declining  Objective:    Scheduled Meds:atorvastatin, 60 mg, Oral, Nightly  carvedilol, 6.25 mg, Oral, BID With Meals  dexamethasone, 6 mg, Intravenous, Q24H  famotidine, 20 mg, Intravenous, Q12H  heparin (porcine), 5,000 Units, Subcutaneous, Q12H  insulin lispro, 0-7 Units, Subcutaneous, TID AC  levETIRAcetam, 500 mg, Intravenous, Q12H  remdesivir, 100 mg, Intravenous, Q24H  sevelamer, 800 mg, Oral, TID With Meals  sodium hypochlorite, , Topical, Q12H      Continuous Infusions:     Vital signs in last 24 hours:  Temp:  [96 °F (35.6 °C)-98 °F (36.7 °C)] 97.6 °F (36.4 °C)  Heart Rate:  [] 105  Resp:  [16-20] 16  BP: (104-135)/(61-87) 119/74    Intake/Output:    Intake/Output Summary (Last 24 hours) at 2021 2140  Last data filed at 2021 1840  Gross per 24 hour   Intake 620 ml   Output 2000 ml   Net -1380 ml       Exam:  /74 (BP Location: Left arm, Patient Position: Lying)   Pulse 105   Temp 97.6 °F (36.4 °C) (Oral)   Resp 16   Ht 177 cm (69.69\")   Wt 85.3 kg (188 lb)   SpO2 100%   BMI 27.22 kg/m²   Patient is examined using the personal protective equipment as per guidelines from infection control for this particular patient as enacted.  Hand washing was performed before and after patient interaction.  General Appearance:   Chronically ill-appearing female who does not appear to be in any acute distress   Head:    Normocephalic, without obvious abnormality, atraumatic   Eyes:    PERRL, conjunctivae/corneas clear, EOM's intact, both eyes   Ears:    Normal external ear canals, both ears   Nose:   Nares normal, septum " midline, mucosa normal, no drainage    or sinus tenderness   Throat:   Lips, tongue, gums normal; oral mucosa pink and moist   Neck:   Supple, symmetrical, trachea midline, no adenopathy;     thyroid:  no enlargement/tenderness/nodules; no carotid    bruit or JVD   Back:     Symmetric, no curvature, ROM normal, no CVA tenderness   Lungs:     Clear to auscultation bilaterally, respirations unlabored   Chest Wall:    No tenderness or deformity    Heart:    Regular rate and rhythm, S1 and S2 normal, no murmur, rub   or gallop   Abdomen:     Soft, non-tender, bowel sounds active all four quadrants,     no masses, no hepatomegaly, no splenomegaly   Extremities:  Contractures of lower extremities.  Right upper arm AV fistula in place   Pulses:   Pulses palpable in all extremities; symmetric all extremities   Skin:   Skin color normal, Skin is warm and dry,  no rashes or palpable lesions   Neurologic:  Residual neurological deficit with left-sided weakness            Data Review:    I reviewed the patient's new clinical results.  Results from last 7 days   Lab Units 12/11/21  1159 12/10/21  0456 12/09/21  1646   WBC 10*3/mm3 7.99 6.55 6.10   HEMOGLOBIN g/dL 9.1* 8.7* 10.3*   PLATELETS 10*3/mm3 296 265 336     Results from last 7 days   Lab Units 12/11/21  1159 12/10/21  0456 12/09/21  1802   SODIUM mmol/L 139 135* 137   POTASSIUM mmol/L 3.5 4.2 5.0   CHLORIDE mmol/L 97* 97* 97*   CO2 mmol/L 26.2 19.3* 24.9   BUN mg/dL 32* 58* 50*   CREATININE mg/dL 3.67* 6.12* 5.79*   CALCIUM mg/dL 9.5 9.3 9.4   GLUCOSE mg/dL 156* 197* 165*         Assessment:    Pneumonia due to COVID-19 virus  1-systemic COVID-19 infection with  2-COVID-19 pneumonia with hypoxia further confounded by  3-possible volume overload in the setting of end-stage renal disease on hemodialysis  4-history of CVA  5-vascular dementia  6-diabetes mellitus  7-immobilization syndrome        Recommendations/Discussions:  · Continue supportive care  · Management of  end-stage renal disease for primary team and nephrology service  · Continuedexamethasone and remdesivir with close monitoring of her clinical course in terms of need for oxygen supplementation and sense of wellbeing.  Patient would benefit from aggressive volume management and dialysis as scheduled prevent volume overload.  Follow-up on her inflammatory markers.  · If she shows decline in her respiratory status and needing oxygen supplementation with devices such as high flow nasal cannula or positive pressure ventilation then she may be a candidate for Actemra or baricitinib depending on the timeframe in which this deterioration occurs.    · Conversely if she continues to improve and maintain oxygen saturation above 94% on room air remdesivir may not have to be continued.    Jack Baird MD  12/11/2021  13:40  Much of this encounter note is an electronic transcription/translation of spoken language to printed text. The electronic translation of spoken language may permit erroneous, or at times, nonsensical words or phrases to be inadvertently transcribed; Although I have reviewed the note for such errors, some may still exist

## 2021-12-12 NOTE — NURSING NOTE
Notified and followed up with aminata Phillips about elevated troponin, suggest to follow up with zaria abbott,

## 2021-12-12 NOTE — PROGRESS NOTES
[facility] Progress Note      Patient Name: Carolina Bhat  : 1960  MRN: [id number]  Primary Care Physician:  Provider, No Known  Date of admission: 2021    Subjective     Interval History:   Has no new complaints today.  Did not sleep well overnight.  Denies any fever or chills.  No chest pain or shortness of breath.    Review of Systems:   As noted above    Objective     Vitals:   Temp:  [97.6 °F (36.4 °C)-99.6 °F (37.6 °C)] 99.6 °F (37.6 °C)  Heart Rate:  [102-114] 102  Resp:  [16] 16  BP: (119-142)/(74-90) 131/74  Flow (L/min):  [5-6] 6    Intake/Output Summary (Last 24 hours) at 2021 1728  Last data filed at 2021 1245  Gross per 24 hour   Intake 580 ml   Output --   Net 580 ml       Physical Exam:      Constitutional: Awake, difficult to understand .  Responds to basic questions  HEENT: Sclera anicteric, no conjunctival injection  Neck: Supple, no thyromegaly, no lymphadenopathy, trachea at midline, no JVD  Respiratory: Bilateral basal crackles  Cardiovascular: RRR, no murmurs, no rubs or gallops, no carotid bruit  Gastrointestinal: Positive bowel sounds, abdomen is soft, nontender and nondistended  : No palpable bladder  Musculoskeletal: No edema, no clubbing or cyanosis  Psychiatric: Appropriate affect, cooperative  Neurologic: Left-sided weakness  Access: Right upper extremity AV fistula with good thrill    Scheduled Meds:     atorvastatin, 60 mg, Oral, Nightly  carvedilol, 6.25 mg, Oral, BID With Meals  dexamethasone, 6 mg, Intravenous, Q24H  famotidine, 20 mg, Intravenous, Q12H  heparin (porcine), 5,000 Units, Subcutaneous, Q12H  insulin glargine, 10 Units, Subcutaneous, Nightly  insulin lispro, 0-7 Units, Subcutaneous, TID AC  levETIRAcetam, 500 mg, Oral, BID  remdesivir, 100 mg, Intravenous, Q24H  sevelamer, 800 mg, Oral, TID With Meals  sodium hypochlorite, , Topical, Q12H      IV Meds:        Results Reviewed:   I have personally reviewed  the results from the time of this admission to 12/12/2021 17:28 EST     Results from last 7 days   Lab Units 12/12/21  0521 12/11/21  1159 12/10/21  0456   SODIUM mmol/L 137 139 135*   POTASSIUM mmol/L 3.4* 3.5 4.2   CHLORIDE mmol/L 97* 97* 97*   CO2 mmol/L 23.5 26.2 19.3*   BUN mg/dL 44* 32* 58*   CREATININE mg/dL 4.55* 3.67* 6.12*   CALCIUM mg/dL 9.7 9.5 9.3   BILIRUBIN mg/dL 0.4 0.4 0.4   ALK PHOS U/L 98 88 77   ALT (SGPT) U/L 20 26 26   AST (SGOT) U/L 33* 56* 73*   GLUCOSE mg/dL 238* 156* 197*       Estimated Creatinine Clearance: 15.3 mL/min (A) (by C-G formula based on SCr of 4.55 mg/dL (H)).    Results from last 7 days   Lab Units 12/11/21  1159   PHOSPHORUS mg/dL 2.6             Results from last 7 days   Lab Units 12/12/21  0521 12/11/21  1159 12/10/21  0456 12/09/21  1646   WBC 10*3/mm3 8.73 7.99 6.55 6.10   HEMOGLOBIN g/dL 10.0* 9.1* 8.7* 10.3*   PLATELETS 10*3/mm3 303 296 265 336             Assessment / Plan     ASSESSMENT:  1.  End-stage renal disease on maintenance in center hemodialysis on a Monday Wednesday Friday schedule.    2.  Hypertension with end-stage renal disease: Blood pressure controlled.  3.  Acute hypoxemic respiratory failure secondary to COVID-19 pneumonia versus volume overload.  4.  Hyperphosphatemia on binders now with hypophosphatemia we will start binders  5.  COVID-19 pneumonia.  6.  History of CVA  7.Diabetes mellitus type 2 diet controlled now.  8.  Anemia: Likely secondary to end-stage renal disease.  Iron panel not in favor of iron deficiency anemia  9. Hypokalemia: we will replace        Plan:  Continue dialysis per schedule.  Next tentative dialysis scheduled for Monday.  Hold Renvela given hypophosphatemia  Surveillance labs    Thank you for involving us in the care of Carolina Bhat.  Please feel free to call with any questions.    Joshua Goodson MD  12/12/21  17:28 EST    [facility]  [phone]      Much of this encounter note is an  electronic transcription/translation of spoken language to printed text. The electronic translation of spoken language may permit erroneous, or at times, nonsensical words or phrases to be inadvertently transcribed; Although I have reviewed the note for such errors, some may still exist.

## 2021-12-12 NOTE — PROGRESS NOTES
"DAILY PROGRESS NOTE  [facility], [facility]    2021    Patient Identification:  Name: Carolina Bhat  Age: 61 y.o.  Sex: female  :  1960  MRN: [id number]           Primary Care Physician: Provider, No Known    Subjective:    Interval History:    Patient was awake most of the night, restless.  This morning she is calm.  No shortness of breath.  Vital signs stable, saturation 5 L is 95%.  Pain appears to be controlled on current regimen  Patient is day 12 after becoming positive for COVID-19 infection.  Hemodialysis per renal.    ROS:     No nausea, vomiting, diarrhea, constipation, chest pain    Objective:    Scheduled Meds:  atorvastatin, 60 mg, Oral, Nightly  carvedilol, 6.25 mg, Oral, BID With Meals  dexamethasone, 6 mg, Intravenous, Q24H  famotidine, 20 mg, Intravenous, Q12H  heparin (porcine), 5,000 Units, Subcutaneous, Q12H  insulin lispro, 0-7 Units, Subcutaneous, TID AC  levETIRAcetam, 500 mg, Intravenous, Q12H  remdesivir, 100 mg, Intravenous, Q24H  sevelamer, 800 mg, Oral, TID With Meals  sodium hypochlorite, , Topical, Q12H        Continuous Infusions:       PRN Meds:  •  acetaminophen  •  albumin human  •  dextrose  •  dextrose  •  glucagon (human recombinant)  •  HYDROcodone-acetaminophen  •  ondansetron  •  [COMPLETED] Insert peripheral IV **AND** sodium chloride    Intake/Output:    Intake/Output Summary (Last 24 hours) at 2021 1227  Last data filed at 2021 1840  Gross per 24 hour   Intake 460 ml   Output --   Net 460 ml         Exam:    T MAX 24 hrs: Temp (24hrs), Av.3 °F (36.3 °C), Min:96 °F (35.6 °C), Max:98.2 °F (36.8 °C)    Vitals Ranges:   Temp:  [96 °F (35.6 °C)-98.2 °F (36.8 °C)] 98.2 °F (36.8 °C)  Heart Rate:  [] 104  Resp:  [16] 16  BP: (119-134)/(74-87) 119/74    /74 (BP Location: Left arm, Patient Position: Lying)   Pulse 104   Temp 98.2 °F (36.8 °C) (Oral)   Resp 16   Ht 177 cm (69.69\")   Wt 85.3 " kg (188 lb)   SpO2 95%   BMI 27.22 kg/m²     General: Alert, oriented x1-2.  Cooperative.  Complaining of pain in her buttocks.    Neck: Supple, symmetrical, trachea midline, no adenopathy;              thyroid:  no enlargement/tenderness/nodules;              no carotid bruit or JVD  Cardiovascular: Normal rate, regular rhythm and intact distal pulses.              Exam reveals no gallop and no friction rub. No murmur heard  Pulmonary: Clear to auscultation bilaterally, respirations unlabored.               No rhonchi, wheezing or rales.   Abdominal: Soft. Soft, nontender, bowel sounds active all four quadrants,     no masses, no hepatomegaly, no splenomegaly.   Extremities: Left hand contraction.    Neurological: Patient is awake oriented x2.  No new focal sensorimotor deficit.    Skin: Wound in sacral area covered with dressings.        Data Review:    Results from last 7 days   Lab Units 12/12/21  0521 12/11/21  1159 12/10/21  0456   WBC 10*3/mm3 8.73 7.99 6.55   HEMOGLOBIN g/dL 10.0* 9.1* 8.7*   HEMATOCRIT % 30.5* 26.4* 25.7*   PLATELETS 10*3/mm3 303 296 265       Results from last 7 days   Lab Units 12/12/21  0521 12/11/21  1159 12/10/21  0456   SODIUM mmol/L 137 139 135*   POTASSIUM mmol/L 3.4* 3.5 4.2   CHLORIDE mmol/L 97* 97* 97*   CO2 mmol/L 23.5 26.2 19.3*   BUN mg/dL 44* 32* 58*   CREATININE mg/dL 4.55* 3.67* 6.12*   CALCIUM mg/dL 9.7 9.5 9.3   BILIRUBIN mg/dL 0.4 0.4 0.4   ALK PHOS U/L 98 88 77   ALT (SGPT) U/L 20 26 26   AST (SGOT) U/L 33* 56* 73*   GLUCOSE mg/dL 238* 156* 197*           Results from last 7 days   Lab Units 12/09/21  1646   HEMOGLOBIN A1C % 5.70*       Lab Results   Lab Value Date/Time    TROPONINT 1.840 (C) 12/12/2021 0521    TROPONINT 2.020 (C) 12/11/2021 1159    TROPONINT 1.210 (C) 12/10/2021 0456    TROPONINT 0.464 (C) 12/09/2021 1646    TROPONINT 0.037 (C) 02/17/2021 1435    TROPONINT 0.033 (C) 02/17/2021 1216    TROPONINT 0.045 (H) 01/22/2019 1255    TROPONINT 0.052 (H)  01/22/2019 0357    TROPONINT 0.051 (H) 01/21/2019 1108    TROPONINT 0.073 (H) 06/13/2016 0640    TROPONINT 0.02 09/30/2015 0258       Microbiology Results (last 10 days)     Procedure Component Value - Date/Time    CANDIDA AURIS SCREEN - Swab, Axilla Right, Axilla Left and Groin [[id number]]  (Normal) Collected: 12/10/21 0928    Lab Status: Preliminary result Specimen: Swab from Axilla Right, Axilla Left and Groin Updated: 12/12/21 1015     Candida Auris Screen Culture No Candida auris isolated at 24 hours    Blood Culture - Blood, Arm, Right [[id number]]  (Normal) Collected: 12/09/21 2032    Lab Status: Preliminary result Specimen: Blood from Arm, Right Updated: 12/11/21 2045     Blood Culture No growth at 2 days    Blood Culture - Blood, Arm, Left [[id number]]  (Normal) Collected: 12/09/21 1818    Lab Status: Preliminary result Specimen: Blood from Arm, Left Updated: 12/11/21 1830     Blood Culture No growth at 2 days    Respiratory Panel PCR w/COVID-19(SARS-CoV-2) CHAYITO/JESSIE/HIMANSHU/PAD/COR/MAD/KEMAR In-House, NP Swab in UTM/VTM, 3-4 HR TAT - Swab, Nasopharynx [[id number]]  (Abnormal) Collected: 12/09/21 1804    Lab Status: Final result Specimen: Swab from Nasopharynx Updated: 12/09/21 1911     ADENOVIRUS, PCR Not Detected     Coronavirus 229E Not Detected     Coronavirus HKU1 Not Detected     Coronavirus NL63 Not Detected     Coronavirus OC43 Not Detected     COVID19 Detected     Human Metapneumovirus Not Detected     Human Rhinovirus/Enterovirus Not Detected     Influenza A PCR Not Detected     Influenza B PCR Not Detected     Parainfluenza Virus 1 Not Detected     Parainfluenza Virus 2 Not Detected     Parainfluenza Virus 3 Not Detected     Parainfluenza Virus 4 Not Detected     RSV, PCR Not Detected     Bordetella pertussis pcr Not Detected     Bordetella parapertussis PCR Not Detected     Chlamydophila pneumoniae PCR Not Detected     Mycoplasma pneumo by PCR Not Detected    Narrative:      In the setting of a positive  respiratory panel with a viral infection PLUS a negative procalcitonin without other underlying concern for bacterial infection, consider observing off antibiotics or discontinuation of antibiotics and continue supportive care. If the respiratory panel is positive for atypical bacterial infection (Bordetella pertussis, Chlamydophila pneumoniae, or Mycoplasma pneumoniae), consider antibiotic de-escalation to target atypical bacterial infection.           Imaging Results (Last 7 Days)     Procedure Component Value Units Date/Time    XR Chest 1 View [[id number]] Collected: 12/09/21 1728     Updated: 12/09/21 2107    Narrative:      EXAMINATION: SINGLE VIEW CHEST RADIOGRAPH     HISTORY: 60-year-old female with a history of Covid and shortness of  air.     FINDINGS: An AP portable chest radiograph was obtained. Comparison is  made to a chest radiograph dated 02/17/2021. There is hazy opacification  seen throughout both lungs with interstitial opacification in both  lungs, left worse than right. The cardiac silhouette is mildly enlarged.  The visualized osseous structures appear normal.       Impression:      There is evidence of bilateral interstitial and ground-glass  infiltrates consistent with bilateral pneumonia and/or pulmonary edema.  Mild cardiomegaly is noted.     This report was finalized on 12/9/2021 9:04 PM by Dr. Nacho Chong M.D.               Assessment:        Pneumonia due to COVID-19 virus  Acute respiratory failure with hypoxia  Volume overload  End-stage renal disease on dialysis  Elevated troponin.  Diabetes mellitus 2  Anemia, chronic kidney disease  Seizure disorder  Coronary artery disease  Hypertension  Hypokalemia        Plan:    Patient is more stable.  Respiratory status stable as well, on 5 L nasal cannula, saturation 95%.  Has been restless at nighttime however.  Stable this morning.  Wound care following her  Continue supportive therapy for COVID-19 pneumonia.  In addition to remdesivir  as well as dexamethasone.  Hemodialysis per renal  Monitoring troponin as well as EKG.  Patient has no chest pain.  Troponin however keep trending up.    Adjust insulin as needed  Monitor mental status, at baseline  Continue DVT/stress ulcer prophylaxis  Labs in am      Rhys Mary MD  12/12/2021  12:27 EST         I wore protective equipment throughout this patient encounter including a face mask, gloves, and protective eyewear.  Hand hygiene was performed before donning protective equipment and after removal when leaving the room.

## 2021-12-12 NOTE — PLAN OF CARE
Goal Outcome Evaluation:              Outcome Summary: vss, changed over into a specialty bed, weaned 02 down to 5L, pt continously cries out when staff is out of the room, c/o pain in back and buttocks, increased the frequency of oral prn pain medications, pt cries/moans with q 2 turns, allowed the pt to speak with and  facetime with daughter with daughters since today is her birthday, will continue to monitor

## 2021-12-12 NOTE — PROGRESS NOTES
HOSPITAL FOLLOW UP NOTE    Patient Name: Carolina Bhat  Patient : 1960        Date of Service:21  Provider of Service: Tone Rivero MD  Place of Service: [facility]  Referral Provider: Rhys Mary MD          Follow Up: Elevated troponin, SVT  Interval Hx: Troponin looks like it has peaked.  Now trending down.  No further SVT.      OBJECTIVE  Temp:  [96 °F (35.6 °C)-98.2 °F (36.8 °C)] 98.2 °F (36.8 °C)  Heart Rate:  [] 104  Resp:  [16] 16  BP: (119-134)/(74-87) 119/74     Intake/Output Summary (Last 24 hours) at 2021 1146  Last data filed at 2021 1840  Gross per 24 hour   Intake 460 ml   Output --   Net 460 ml     Body mass index is 27.22 kg/m².      21  2320   Weight: 85.3 kg (188 lb)         Physical Exam:   Physical Exam  Direct exam performed due to COVID-19 and preservation of PPE    CURRENT MEDS    Scheduled Meds:atorvastatin, 60 mg, Oral, Nightly  carvedilol, 6.25 mg, Oral, BID With Meals  dexamethasone, 6 mg, Intravenous, Q24H  famotidine, 20 mg, Intravenous, Q12H  heparin (porcine), 5,000 Units, Subcutaneous, Q12H  insulin lispro, 0-7 Units, Subcutaneous, TID AC  levETIRAcetam, 500 mg, Intravenous, Q12H  remdesivir, 100 mg, Intravenous, Q24H  sevelamer, 800 mg, Oral, TID With Meals  sodium hypochlorite, , Topical, Q12H      Continuous Infusions:       Lab Review:   Results from last 7 days   Lab Units 21  0521 21  1159   SODIUM mmol/L 137 139   POTASSIUM mmol/L 3.4* 3.5   CHLORIDE mmol/L 97* 97*   CO2 mmol/L 23.5 26.2   BUN mg/dL 44* 32*   CREATININE mg/dL 4.55* 3.67*   GLUCOSE mg/dL 238* 156*   CALCIUM mg/dL 9.7 9.5   AST (SGOT) U/L 33* 56*   ALT (SGPT) U/L 20 26         Results from last 7 days   Lab Units 21  0521 21  1159   WBC 10*3/mm3 8.73 7.99   HEMOGLOBIN g/dL 10.0* 9.1*   HEMATOCRIT % 30.5* 26.4*   PLATELETS 10*3/mm3 303 296                 Results from last 7 days   Lab Units 21  0537    PROBNP pg/mL 11,613.0*           I personally reviewed the patient's ECG and telemetry data    ASSESSMENT & PLAN    Pneumonia due to COVID-19 virus      1. Pneumonia  2. Elevated troponin: multifactorial.  In part may be Type II demand   3. CKD: on dialysis  4. SVT: no recurrence  5. Diabetes    Continue current treatment.  Recheck electrocardiogram    Tone Rivero MD  12/12/21

## 2021-12-13 NOTE — THERAPY EVALUATION
Acute Care - Speech Language Pathology   Swallow Initial Evaluation [facility]     Patient Name: Carolina Bhat  : 1960  MRN: [id number]  Today's Date: 2021               Admit Date: 2021    Visit Dx:     ICD-10-CM ICD-9-CM   1. Pneumonia due to COVID-19 virus  U07.1 480.8    J12.82 079.89   2. ESRD on hemodialysis ([facility])  N18.6 585.6    Z99.2 V45.11   3. Immobility syndrome  M62.3 728.3   4. Hypoxia  R09.02 799.02   5. SVT (supraventricular tachycardia) ([facility])  I47.1 427.89     Patient Active Problem List   Diagnosis   • DM (diabetes mellitus) type II controlled with renal manifestation ([facility])   • Anemia of chronic renal failure, stage 3 (moderate) ([facility])   • Diabetic polyneuropathy associated with type 2 diabetes mellitus ([facility])   • ESRD on hemodialysis ([facility])   • HLD (hyperlipidemia)   • Altered mental status   • Immobility   • Morbid obesity ([facility])   • Sacral ulcer ([facility])   • Infected decubitus ulcer   • Metabolic encephalopathy   • Blood bacterial culture positive   • Seizure disorder ([facility])   • Complicated UTI (urinary tract infection)   • Pneumonia due to COVID-19 virus     Past Medical History:   Diagnosis Date   • Acid reflux    • Anemia    • Anxiety    • Bacteremia    • Chest pain    • Chronic constipation    • Chronic pain    • Coronary artery disease    • CVA (cerebral vascular accident) ([facility]) ,    • Dementia ([facility])    • Depression    • DM (diabetes mellitus) type II controlled with renal manifestation ([facility])    • Dyslipidemia    • Dysphagia     G-tube removed    • Epilepsy ([facility])    • ESRD (end stage renal disease) ([facility]) 2016    Started HD 2016  [facility]   • Flexion contractures    • Hard to intubate     pt does not know   • History of esophagitis    • HLD (hyperlipidemia)    • Hypertension    • 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 OR;  Service:    • BREAST BIOPSY         SLP Recommendation and Plan  SLP Swallowing Diagnosis: suspected pharyngeal dysphagia (12/13/21 1400)  SLP Diet Recommendation: puree, honey thick liquids (12/13/21 1400)  Recommended Precautions and Strategies: upright posture during/after eating, small bites of food and sips of liquid, no straw, assist with feeding (12/13/21 1400)  SLP Rec. for Method of Medication Administration: meds whole, meds crushed, with pudding or applesauce (12/13/21 1400)     Monitor for Signs of Aspiration: yes, notify SLP if any concerns (12/13/21 1400)  Recommended Diagnostics: reassess via clinical swallow evaluation (12/13/21 1400)     Anticipated Discharge Disposition (SLP): skilled nursing facility (12/13/21 1400)     Therapy Frequency (Swallow): PRN (12/13/21 1400)                            Plan of Care Reviewed With: patient  Outcome Summary: Patient seen for clinical swallow evaluation d/t report of coughing with nectar. L facial and arm weakness noted, hx of stroke and these are residual deficits per patient. Pt easily tearful and would yell out when confused. No overt s/s of aspiration with honey via spoon or cup. Immediate cough with honey via straw. No overt s/s of aspiration with puree. Delayed cough with mech soft. SLP recs puree and honey, no straws. Meds whole or crushed with puree. Pt will need assist with meals.      SWALLOW EVALUATION (last 72 hours)     SLP Adult Swallow Evaluation     Row Name 12/13/21 1400                   Rehab Evaluation    Document Type evaluation  -SH        Patient Effort adequate  -SH                   General Information    Patient Profile Reviewed yes  -SH        Pertinent History Of Current Problem Hx CVA and modified diet. Pt coughing intermittently with nectar. BSE 2/15/19 puree/fork mash. COVID  -        Current Method of Nutrition mechanical soft, no mixed consistencies; nectar/syrup-thick liquids  -        Precautions/Limitations, Vision WFL; for purposes of eval  -        Precautions/Limitations, Hearing WFL; for purposes of eval  -        Prior Level of Function-Communication cognitive-linguistic impairment  -        Prior Level of Function-Swallowing honey thick liquids; puree  -        Plans/Goals Discussed with patient; agreed upon  -        Barriers to Rehab medically complex; previous functional deficit  -        Patient's Goals for Discharge patient did not state  -                  Pain    Additional Documentation Pain Scale: Word Pre/Post-Treatment (Group)  -                  Pain Scale: Word Pre/Post-Treatment    Pain: Word Scale, Pretreatment 0 - no pain  -                  Oral Musculature and Cranial Nerve Assessment    Oral Motor General Assessment mandibular impairment; oral labial or buccal impairment; lingual impairment  -        Oral Motor, Comment L droop  -                  Clinical Swallow Eval    Clinical Swallow Evaluation Summary Patient seen for clinical swallow evaluation d/t report of coughing with nectar. L facial and arm weakness noted, hx of stroke and these are residual deficits per patient. Pt easily tearful and would yell out when confused. No overt s/s of aspiration with honey via spoon or cup. Immediate cough with honey via straw. No overt s/s of aspiration with puree. Delayed cough with mech soft. SLP recs puree and honey, no straws. Meds whole or crushed with puree. Pt will need assist with meals.  -                  Clinical Impression    SLP Swallowing Diagnosis suspected pharyngeal dysphagia  -                  Recommendations     Therapy Frequency (Swallow) PRN  -        SLP Diet Recommendation puree; honey thick liquids  -        Recommended Diagnostics reassess via clinical swallow evaluation  -        Recommended Precautions and Strategies upright posture during/after eating; small bites of food and sips of liquid; no straw; assist with feeding  -        Oral Care Recommendations Oral Care before breakfast, after meals and PRN  -        SLP Rec. for Method of Medication Administration meds whole; meds crushed; with pudding or applesauce  -        Monitor for Signs of Aspiration yes; notify SLP if any concerns  -        Anticipated Discharge Disposition (SLP) skilled nursing facility  -                  Swallow Goals (SLP)    Oral Nutrition/Hydration Goal Selection (SLP) oral nutrition/hydration, SLP goal 1  -                  Oral Nutrition/Hydration Goal 1 (SLP)    Oral Nutrition/Hydration Goal 1, SLP Pt will rosie PO without overt s/s of asp.  -        Time Frame (Oral Nutrition/Hydration Goal 1, SLP) by discharge  -              User Key  (r) = Recorded By, (t) = Taken By, (c) = Cosigned By    Initials Name Effective Dates     Norah Duque MS CCC-SLP 06/16/21 -                 EDUCATION  The patient has been educated in the following areas:   Dysphagia (Swallowing Impairment).        SLP GOALS     Row Name 12/13/21 1400             Oral Nutrition/Hydration Goal 1 (SLP)    Oral Nutrition/Hydration Goal 1, SLP Pt will rosie PO without overt s/s of asp.  -      Time Frame (Oral Nutrition/Hydration Goal 1, SLP) by discharge  -            User Key  (r) = Recorded By, (t) = Taken By, (c) = Cosigned By    Initials Name Provider Type    Norah Durand MS CCC-SLP Speech and Language Pathologist              SLP Outcome Measures (last 72 hours)     SLP Outcome Measures     Row Name 12/13/21 1500             SLP Outcome Measures    Outcome Measure Used? Adult NOMS  -              Adult FCM Scores    FCM Chosen  Swallowing  -      Swallowing FCM Score 3  -            User Key  (r) = Recorded By, (t) = Taken By, (c) = Cosigned By    Initials Name Effective Dates     Norah Duque MS CCC-SLP 06/16/21 -                  Time Calculation:    Time Calculation- SLP     Row Name 12/13/21 1502             Time Calculation- SLP    SLP Start Time 1330  -      SLP Received On 12/13/21  -              Untimed Charges    [id number] Eval Oral Pharyng Swallow Minutes 75  -              Total Minutes    Untimed Charges Total Minutes 75  -       Total Minutes 75  -SH            User Key  (r) = Recorded By, (t) = Taken By, (c) = Cosigned By    Initials Name Provider Type     Norah Duque MS CCC-SLP Speech and Language Pathologist                Therapy Charges for Today     Code Description Service Date Service Provider Modifiers Qty    [id number] HC ST EVAL ORAL PHARYNG SWALLOW 5 12/13/2021 Norah Duque MS CCC-SLP GN 1               Norah Duque MS CCC-SLP  12/13/2021

## 2021-12-13 NOTE — PLAN OF CARE
Goal Outcome Evaluation:  Plan of Care Reviewed With: patient        Progress: no change  Outcome Summary: Patient A&Ox 4 but forgetful at times where she repeats the same request discussed.  Supplemental oxygen was 6LHF at the beginning of the shift. The oxygen saturation dropped after she went to sleep  where it was 87 to 89 percent where it appeared she was mouth breathing.  Increased supplemental oxygen to 8L HFNC then to 15LHF due to oxygen saturations in the mid to upper 80 percent.  Non-rebreather placed on 11 L and HFNC 9L inwhich oxygen saturation went into the mid 90's.  Respiratory notified.  Wound care performed.  VSS.  WCTM.

## 2021-12-13 NOTE — PLAN OF CARE
Goal Outcome Evaluation:  Plan of Care Reviewed With: patient        Progress: no change     VSS, 15 L high flow NC. Oriented to self and place. Pt frequently crying out with repetitive requests and difficult to console. PRN norco given twice this shift as charted, pt c/o pain in her buttocks and her L arm. Speech therapy changed diet to HTL with no straws. Pt refused wound care and turns this shift, despite education on the importance of both. Pt scheduled for dialysis today, this RN called at 1553 and spoke to dialysis RN Man who states the pt is on the schedule for this evening. Will CTM.

## 2021-12-13 NOTE — PLAN OF CARE
Goal Outcome Evaluation:  Plan of Care Reviewed With: patient           Outcome Summary: Patient seen for clinical swallow evaluation d/t report of coughing with nectar. L facial and arm weakness noted, hx of stroke and these are residual deficits per patient. Pt easily tearful and would yell out when confused. No overt s/s of aspiration with honey via spoon or cup. Immediate cough with honey via straw. No overt s/s of aspiration with puree. Delayed cough with mech soft. SLP recs puree and honey, no straws. Meds whole or crushed with puree. Pt will need assist with meals.    Patient was not wearing a face mask during this therapy encounter. Therapist used appropriate personal protective equipment including gown, eye protection, mask and gloves.  Mask used was CAPR. Appropriate PPE was worn during the entire therapy session. Hand hygiene was completed before and after therapy session. Patient is in enhanced droplet precautions.

## 2021-12-13 NOTE — NURSING NOTE
Called dialysis center at [phone] at 1936 on 12/12/21 for 12/13/21 dialysis order.  Sandy Hernandez RN.

## 2021-12-13 NOTE — PROGRESS NOTES
"DAILY PROGRESS NOTE  [facility], [facility]    2021    Patient Identification:  Name: Carolina Bhat  Age: 61 y.o.  Sex: female  :  1960  MRN: [id number]           Primary Care Physician: Provider, No Known    Subjective:    Interval History:    Patient was on 6 to 8 L of oxygen overnight, now weaned down to 3 L.  Improving.  To have dialysis today.  Not longer have SVT. Cardiology following  Pain appears to be controlled on current regimen  Patient is day 13 after becoming positive for COVID-19 infection.    ROS:     No nausea, vomiting, diarrhea, constipation, chest pain    Objective:    Scheduled Meds:  atorvastatin, 60 mg, Oral, Nightly  carvedilol, 6.25 mg, Oral, BID With Meals  dexamethasone, 6 mg, Intravenous, Q24H  famotidine, 20 mg, Intravenous, Q12H  heparin (porcine), 5,000 Units, Subcutaneous, Q12H  insulin glargine, 10 Units, Subcutaneous, Nightly  insulin lispro, 0-7 Units, Subcutaneous, TID AC  levETIRAcetam, 500 mg, Oral, BID  remdesivir, 100 mg, Intravenous, Q24H  sodium hypochlorite, , Topical, Q12H        Continuous Infusions:       PRN Meds:  •  acetaminophen  •  dextrose  •  dextrose  •  glucagon (human recombinant)  •  HYDROcodone-acetaminophen  •  ondansetron  •  [COMPLETED] Insert peripheral IV **AND** sodium chloride    Intake/Output:  No intake or output data in the 24 hours ending 21 1807      Exam:    T MAX 24 hrs: Temp (24hrs), Av.5 °F (36.4 °C), Min:97.1 °F (36.2 °C), Max:98.2 °F (36.8 °C)    Vitals Ranges:   Temp:  [97.1 °F (36.2 °C)-98.2 °F (36.8 °C)] 97.1 °F (36.2 °C)  Heart Rate:  [74-90] 74  Resp:  [18-20] 20  BP: (111-141)/(61-90) 141/78    /78 (BP Location: Left arm, Patient Position: Lying)   Pulse 74   Temp 97.1 °F (36.2 °C) (Oral)   Resp 20   Ht 177 cm (69.69\")   Wt 89 kg (196 lb 1.6 oz)   SpO2 99%   BMI 28.39 kg/m²     General: Alert, oriented x1-2.  Cooperative.  Complaining of pain in her " buttocks.    Neck: Supple, symmetrical, trachea midline, no adenopathy;              thyroid:  no enlargement/tenderness/nodules;              no carotid bruit or JVD  Cardiovascular: Normal rate, regular rhythm and intact distal pulses.              Exam reveals no gallop and no friction rub. No murmur heard  Pulmonary: Clear to auscultation bilaterally, respirations unlabored.               No rhonchi, wheezing or rales.   Abdominal: Soft. Soft, nontender, bowel sounds active all four quadrants,     no masses, no hepatomegaly, no splenomegaly.   Extremities: Left hand contraction.    Neurological: Patient is awake oriented x2.  No new focal sensorimotor deficit.    Skin: Wound in sacral area covered with dressings.        Data Review:    Results from last 7 days   Lab Units 12/13/21  0804 12/12/21  0521 12/11/21  1159   WBC 10*3/mm3 13.16* 8.73 7.99   HEMOGLOBIN g/dL 9.9* 10.0* 9.1*   HEMATOCRIT % 30.0* 30.5* 26.4*   PLATELETS 10*3/mm3 291 303 296       Results from last 7 days   Lab Units 12/13/21  0804 12/12/21  0521 12/11/21  1159   SODIUM mmol/L 137 137 139   POTASSIUM mmol/L 4.6 3.4* 3.5   CHLORIDE mmol/L 97* 97* 97*   CO2 mmol/L 22.2 23.5 26.2   BUN mg/dL 61* 44* 32*   CREATININE mg/dL 5.67* 4.55* 3.67*   CALCIUM mg/dL 9.7 9.7 9.5   BILIRUBIN mg/dL 0.3 0.4 0.4   ALK PHOS U/L 101 98 88   ALT (SGPT) U/L 21 20 26   AST (SGOT) U/L 25 33* 56*   GLUCOSE mg/dL 200* 238* 156*           Results from last 7 days   Lab Units 12/09/21  1646   HEMOGLOBIN A1C % 5.70*       Lab Results   Lab Value Date/Time    TROPONINT 1.840 (C) 12/12/2021 0521    TROPONINT 2.020 (C) 12/11/2021 1159    TROPONINT 1.210 (C) 12/10/2021 0456    TROPONINT 0.464 (C) 12/09/2021 1646    TROPONINT 0.037 (C) 02/17/2021 1435    TROPONINT 0.033 (C) 02/17/2021 1216    TROPONINT 0.045 (H) 01/22/2019 1255    TROPONINT 0.052 (H) 01/22/2019 0357    TROPONINT 0.051 (H) 01/21/2019 1108    TROPONINT 0.073 (H) 06/13/2016 0640    TROPONINT 0.02 09/30/2015 0258        Microbiology Results (last 10 days)     Procedure Component Value - Date/Time    CANDIDA AURIS SCREEN - Swab, Axilla Right, Axilla Left and Groin [[id number]]  (Normal) Collected: 12/10/21 0928    Lab Status: Preliminary result Specimen: Swab from Axilla Right, Axilla Left and Groin Updated: 12/13/21 1700     Candida Auris Screen Culture No Candida auris isolated at 3 days    Blood Culture - Blood, Arm, Right [[id number]]  (Normal) Collected: 12/09/21 2032    Lab Status: Preliminary result Specimen: Blood from Arm, Right Updated: 12/12/21 2045     Blood Culture No growth at 3 days    Blood Culture - Blood, Arm, Left [[id number]]  (Normal) Collected: 12/09/21 1818    Lab Status: Preliminary result Specimen: Blood from Arm, Left Updated: 12/12/21 1830     Blood Culture No growth at 3 days    Respiratory Panel PCR w/COVID-19(SARS-CoV-2) CHAYITO/JESSIE/HIMANSHU/PAD/COR/MAD/KEMAR In-House, NP Swab in UTM/VTM, 3-4 HR TAT - Swab, Nasopharynx [[id number]]  (Abnormal) Collected: 12/09/21 1804    Lab Status: Final result Specimen: Swab from Nasopharynx Updated: 12/09/21 1911     ADENOVIRUS, PCR Not Detected     Coronavirus 229E Not Detected     Coronavirus HKU1 Not Detected     Coronavirus NL63 Not Detected     Coronavirus OC43 Not Detected     COVID19 Detected     Human Metapneumovirus Not Detected     Human Rhinovirus/Enterovirus Not Detected     Influenza A PCR Not Detected     Influenza B PCR Not Detected     Parainfluenza Virus 1 Not Detected     Parainfluenza Virus 2 Not Detected     Parainfluenza Virus 3 Not Detected     Parainfluenza Virus 4 Not Detected     RSV, PCR Not Detected     Bordetella pertussis pcr Not Detected     Bordetella parapertussis PCR Not Detected     Chlamydophila pneumoniae PCR Not Detected     Mycoplasma pneumo by PCR Not Detected    Narrative:      In the setting of a positive respiratory panel with a viral infection PLUS a negative procalcitonin without other underlying concern for bacterial infection,  consider observing off antibiotics or discontinuation of antibiotics and continue supportive care. If the respiratory panel is positive for atypical bacterial infection (Bordetella pertussis, Chlamydophila pneumoniae, or Mycoplasma pneumoniae), consider antibiotic de-escalation to target atypical bacterial infection.           Imaging Results (Last 7 Days)     Procedure Component Value Units Date/Time    XR Chest 1 View [[id number]] Collected: 12/09/21 1728     Updated: 12/09/21 2107    Narrative:      EXAMINATION: SINGLE VIEW CHEST RADIOGRAPH     HISTORY: 60-year-old female with a history of Covid and shortness of  air.     FINDINGS: An AP portable chest radiograph was obtained. Comparison is  made to a chest radiograph dated 02/17/2021. There is hazy opacification  seen throughout both lungs with interstitial opacification in both  lungs, left worse than right. The cardiac silhouette is mildly enlarged.  The visualized osseous structures appear normal.       Impression:      There is evidence of bilateral interstitial and ground-glass  infiltrates consistent with bilateral pneumonia and/or pulmonary edema.  Mild cardiomegaly is noted.     This report was finalized on 12/9/2021 9:04 PM by Dr. Nacho Chong M.D.               Assessment:        Pneumonia due to COVID-19 virus  Acute respiratory failure with hypoxia  Volume overload  End-stage renal disease on dialysis  Elevated troponin.  Diabetes mellitus 2  Anemia, chronic kidney disease  Seizure disorder  Coronary artery disease  Hypertension  Hypokalemia        Plan:    Oxygen requirements have been down. Now on 8 L of oxygen saturation 94%.  To have dialysis today.  Continue supportive therapy for COVID-19 pneumonia.  In addition to remdesivir as well as dexamethasone.  ID following.  Hemodialysis per renal  Elevated troponin, cardiology following.`  Adjust insulin as needed  Monitor mental status, at baseline  Continue DVT/stress ulcer prophylaxis  Labs in  zaria Mary MD  12/13/2021           I wore protective equipment throughout this patient encounter including a face mask, gloves, and protective eyewear.  Hand hygiene was performed before donning protective equipment and after removal when leaving the room.

## 2021-12-13 NOTE — PROGRESS NOTES
[facility] Progress Note      Patient Name: Carolina Bhat  : 1960  MRN: [id number]  Primary Care Physician:  Provider, No Known  Date of admission: 2021    Subjective     Interval History:   Has no new complaints today.  Had a swallow eval.  Denies any fever or chills.  No chest pain or shortness of breath.    Review of Systems:   As noted above    Objective     Vitals:   Temp:  [97.1 °F (36.2 °C)-98.2 °F (36.8 °C)] 97.1 °F (36.2 °C)  Heart Rate:  [74-90] 74  Resp:  [18-20] 20  BP: (111-141)/(61-90) 141/78  Flow (L/min):  [6-15] 15    Intake/Output Summary (Last 24 hours) at 2021 1502  Last data filed at 2021 1800  Gross per 24 hour   Intake 60 ml   Output --   Net 60 ml       Physical Exam:      Constitutional: Awake, difficult to understand .  Responds to basic questions  HEENT: Sclera anicteric, no conjunctival injection  Neck: Supple, no thyromegaly, no lymphadenopathy, trachea at midline, no JVD  Respiratory: Bilateral basal crackles  Cardiovascular: RRR, no murmurs, no rubs or gallops, no carotid bruit  Gastrointestinal: Positive bowel sounds, abdomen is soft, nontender and nondistended  : No palpable bladder  Musculoskeletal: No edema, no clubbing or cyanosis  Psychiatric: Appropriate affect, cooperative  Neurologic: Left-sided weakness  Access: Right upper extremity AV fistula with good thrill    Scheduled Meds:     atorvastatin, 60 mg, Oral, Nightly  carvedilol, 6.25 mg, Oral, BID With Meals  dexamethasone, 6 mg, Intravenous, Q24H  famotidine, 20 mg, Intravenous, Q12H  heparin (porcine), 5,000 Units, Subcutaneous, Q12H  insulin glargine, 10 Units, Subcutaneous, Nightly  insulin lispro, 0-7 Units, Subcutaneous, TID AC  levETIRAcetam, 500 mg, Oral, BID  remdesivir, 100 mg, Intravenous, Q24H  sodium hypochlorite, , Topical, Q12H      IV Meds:        Results Reviewed:   I have personally reviewed the results from the time of this admission to  12/13/2021 15:02 EST     Results from last 7 days   Lab Units 12/13/21  0804 12/12/21  0521 12/11/21  1159   SODIUM mmol/L 137 137 139   POTASSIUM mmol/L 4.6 3.4* 3.5   CHLORIDE mmol/L 97* 97* 97*   CO2 mmol/L 22.2 23.5 26.2   BUN mg/dL 61* 44* 32*   CREATININE mg/dL 5.67* 4.55* 3.67*   CALCIUM mg/dL 9.7 9.7 9.5   BILIRUBIN mg/dL 0.3 0.4 0.4   ALK PHOS U/L 101 98 88   ALT (SGPT) U/L 21 20 26   AST (SGOT) U/L 25 33* 56*   GLUCOSE mg/dL 200* 238* 156*       Estimated Creatinine Clearance: 12.6 mL/min (A) (by C-G formula based on SCr of 5.67 mg/dL (H)).    Results from last 7 days   Lab Units 12/13/21  0804 12/11/21  1159   PHOSPHORUS mg/dL 2.6 2.6             Results from last 7 days   Lab Units 12/13/21  0804 12/12/21  0521 12/11/21  1159 12/10/21  0456 12/09/21  1646   WBC 10*3/mm3 13.16* 8.73 7.99 6.55 6.10   HEMOGLOBIN g/dL 9.9* 10.0* 9.1* 8.7* 10.3*   PLATELETS 10*3/mm3 291 303 296 265 336             Assessment / Plan     ASSESSMENT:  1.  End-stage renal disease on maintenance in center hemodialysis on a Monday Wednesday Friday schedule.    2.  Hypertension with end-stage renal disease: Blood pressure controlled.  3.  Acute hypoxemic respiratory failure secondary to COVID-19 pneumonia versus volume overload.  4.  Hyperphosphatemia on binders now with hypophosphatemia we will start binders  5.  COVID-19 pneumonia.  6.  History of CVA  7.Diabetes mellitus type 2 diet controlled now.  8.  Anemia: Likely secondary to end-stage renal disease.  Iron panel not in favor of iron deficiency anemia  9. Hypokalemia: we will replace        Plan:  Continue dialysis per schedule.   Surveillance labs    Thank you for involving us in the care of Carolina Bhat.  Please feel free to call with any questions.    Agustin Almanzar MD  12/13/21  15:02 [facility]    [facility]  [phone]      Much of this encounter note is an electronic transcription/translation of spoken language to printed text. The  electronic translation of spoken language may permit erroneous, or at times, nonsensical words or phrases to be inadvertently transcribed; Although I have reviewed the note for such errors, some may still exist.

## 2021-12-13 NOTE — PROGRESS NOTES
[facility]  Cardiology Progress Note    Patient Identification:  Name: Carolina Bhat  Age: 61 y.o.  Sex: female  :  1960  MRN: [id number]                 Follow Up / Chief Complaint: Follow up for elevated troponin, SVT    Interval History: No SVT noted overnight.  Sinus rhythm on the monitor with heart rate in the 80s. On 15 high flow.       Subjective: CDC per protocol. Denies chest pain and with  shortness of breath.      Objective:    Past Medical History:  Past Medical History:   Diagnosis Date   • Acid reflux    • Anemia    • Anxiety    • Bacteremia    • Chest pain    • Chronic constipation    • Chronic pain    • Coronary artery disease    • CVA (cerebral vascular accident) ([facility]) ,    • Dementia ([facility])    • Depression    • DM (diabetes mellitus) type II controlled with renal manifestation ([facility])    • Dyslipidemia    • Dysphagia     G-tube removed    • Epilepsy ([facility])    • ESRD (end stage renal disease) ([facility]) 2016    Started HD   [facility]   • Flexion contractures    • Hard to intubate     pt does not know   • History of esophagitis    • HLD (hyperlipidemia)    • Hypertension    • 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 ([facility]) 2017   • Myocardial infarction ([facility])    • Nausea & vomiting    • Open angle glaucoma with borderline intraocular pressure, bilateral 2017   • Pseudobulbar affect    • Venous insufficiency (chronic) (peripheral)      Past Surgical History:  Past Surgical History:   Procedure Laterality Date   • ARTERIOVENOUS FISTULA/SHUNT SURGERY Right 3/21/2016    Procedure: RT BRACHIAL CEPHALIC FISTULA;  Surgeon: Alla Price Jr., MD;  Location: [facility];  Service:    • BREAST BIOPSY          Social History:   Social History     Tobacco Use   • Smoking status: Former Smoker   • Smokeless tobacco: Former User   Substance Use Topics   • Alcohol use: No      Family History:  History reviewed. No  pertinent family history.       Allergies:  Allergies   Allergen Reactions   • Darvon [Propoxyphene] Confusion     Scheduled Meds:  atorvastatin, 60 mg, Nightly  carvedilol, 6.25 mg, BID With Meals  dexamethasone, 6 mg, Q24H  famotidine, 20 mg, Q12H  heparin (porcine), 5,000 Units, Q12H  insulin glargine, 10 Units, Nightly  insulin lispro, 0-7 Units, TID AC  levETIRAcetam, 500 mg, BID  remdesivir, 100 mg, Q24H  sodium hypochlorite, , Q12H            INTAKE AND OUTPUT:    Intake/Output Summary (Last 24 hours) at 12/13/2021 1111  Last data filed at 12/12/2021 1800  Gross per 24 hour   Intake 690 ml   Output --   Net 690 ml       Review of Systems: [facility] per protocol  GI: Denies nausea and vomiting  Cardiac: Denies chest pain and palpitations  Pulmonary:  shortness of breath      Constitutional:  Temp:  [97.1 °F (36.2 °C)-99.6 °F (37.6 °C)] 97.5 °F (36.4 °C)  Heart Rate:  [] 76  Resp:  [16-18] 18  BP: (111-139)/(61-90) 139/90    Physical Exam:  General:  Appears in no acute distress, resting in bed  Eyes: EOM normal no conjunctival drainage  HEENT:  No JVD. Thyroid not visibly enlarged. No mucosal pallor or cyanosis  Respiratory: Respirations regular and unlabored at rest. BBS with good air entry in all fields. No crackles, rubs or wheezes auscultated  Cardiovascular: S1S2 Regular rate and rhythm. No murmur, rub or gallop. No carotid bruits. DP/PT pulses 2+   . No pretibial pitting edema  Gastrointestinal: Abdomen soft, non tender. Bowel sounds present. No hepatosplenomegaly. No ascites   Skin:   Skin warm and dry to touch. No rashes    Neuro: AAO x3 CN II-XII grossly intact  Psych: Mood and affect normal, pleasant and cooperative          Cardiographics  Telemetry:   Sinus rhythm      ECG:         Echocardiogram:   Interpretation Summary    · Technically limited study  · Left ventricular systolic function is normal. Calculated EF = 69.0%. Estimated EF was in agreement with the calculated EF. Estimated EF = 69%.  "Normal left ventricular cavity size noted. All left ventricular wall segments contract normally. Left ventricular wall thickness is consistent with mild concentric hypertrophy. Left ventricular diastolic dysfunction is noted (grade I) consistent with impaired relaxation.  · Trace mitral valve regurgitation is present.  · Trace tricuspid valve regurgitation is present. Estimated right ventricular systolic pressure from tricuspid regurgitation is normal (<35 mmHg).      2016    Conclusions: Technically difficult study due to patient position and cooperation.Bilateral internal carotid arteries with plaque but <50% stenosis.Bilateral vertebral arteries with antegrade flow.No prior examination for comparison.      Lab Review   Results from last 7 days   Lab Units 12/12/21  0521 12/11/21  1159 12/10/21  0456   TROPONIN T ng/mL 1.840* 2.020* 1.210*         Results from last 7 days   Lab Units 12/13/21  0804   SODIUM mmol/L 137   POTASSIUM mmol/L 4.6   BUN mg/dL 61*   CREATININE mg/dL 5.67*   CALCIUM mg/dL 9.7        Results from last 7 days   Lab Units 12/13/21  0804 12/12/21  0521 12/11/21  1159   WBC 10*3/mm3 13.16* 8.73 7.99   HEMOGLOBIN g/dL 9.9* 10.0* 9.1*   HEMATOCRIT % 30.0* 30.5* 26.4*   PLATELETS 10*3/mm3 291 303 296         The following medical decision was discussed in detail with Dr. Roach      Assessment/Plan:    1.  Pneumonia due to COVID-19 virus  2.  SVT: None noted overnight.  3.  CKD: On dialysis, defer to nephrology  4.  Diabetes mellitus: Defer to IM  5.  Elevated troponin.     Troponin's trending down, echo once out of isolation. Blood pressure stable. Continue stain, and coreg. Will do ecg, and trop in am    )12/13/2021  HEYDI Orozco/Transcription:   \"Dictated utilizing Dragon dictation\".     "

## 2021-12-14 NOTE — PROGRESS NOTES
[facility] Progress Note      Patient Name: Carolina Bhat  : 1960  MRN: [id number]  Primary Care Physician:  Provider, No Known  Date of admission: 2021    Subjective     Interval History:   Has no new complaints today.  Had a swallow eval.  Denies any fever or chills.  No chest pain or shortness of breath.    Review of Systems:   As noted above    Objective     Vitals:   Temp:  [98 °F (36.7 °C)] 98 °F (36.7 °C)  Heart Rate:  [81] 81  Resp:  [16-18] 16  BP: (131-137)/(81-88) 131/88  Flow (L/min):  [4-14] 4    Intake/Output Summary (Last 24 hours) at 2021 1550  Last data filed at 2021 1100  Gross per 24 hour   Intake 240 ml   Output 1000 ml   Net -760 ml       Physical Exam:      Constitutional: Awake, difficult to understand .  Responds to basic questions  HEENT: Sclera anicteric, no conjunctival injection  Neck: Supple, no thyromegaly, no lymphadenopathy, trachea at midline, no JVD  Respiratory: Bilateral basal crackles  Cardiovascular: RRR, no murmurs, no rubs or gallops, no carotid bruit  Gastrointestinal: Positive bowel sounds, abdomen is soft, nontender and nondistended  : No palpable bladder  Musculoskeletal: No edema, no clubbing or cyanosis  Psychiatric: Appropriate affect, cooperative  Neurologic: Left-sided weakness  Access: Right upper extremity AV fistula with good thrill    Scheduled Meds:     atorvastatin, 60 mg, Oral, Nightly  carvedilol, 12.5 mg, Oral, BID With Meals  dexamethasone, 6 mg, Intravenous, Q24H  famotidine, 20 mg, Intravenous, Q12H  heparin (porcine), 5,000 Units, Subcutaneous, Q12H  insulin glargine, 10 Units, Subcutaneous, Nightly  insulin lispro, 0-7 Units, Subcutaneous, TID AC  levETIRAcetam, 500 mg, Oral, BID  remdesivir, 100 mg, Intravenous, Q24H  sodium hypochlorite, , Topical, Q12H      IV Meds:        Results Reviewed:   I have personally reviewed the results from the time of this admission to 2021 15:50 EST      Results from last 7 days   Lab Units 12/14/21  0508 12/13/21  0804 12/12/21  0521   SODIUM mmol/L 134* 137 137   POTASSIUM mmol/L 5.0 4.6 3.4*   CHLORIDE mmol/L 94* 97* 97*   CO2 mmol/L 23.3 22.2 23.5   BUN mg/dL 72* 61* 44*   CREATININE mg/dL 5.91* 5.67* 4.55*   CALCIUM mg/dL 9.6 9.7 9.7   BILIRUBIN mg/dL 0.3 0.3 0.4   ALK PHOS U/L 108 101 98   ALT (SGPT) U/L 19 21 20   AST (SGOT) U/L 18 25 33*   GLUCOSE mg/dL 179* 200* 238*       Estimated Creatinine Clearance: 12 mL/min (A) (by C-G formula based on SCr of 5.91 mg/dL (H)).    Results from last 7 days   Lab Units 12/13/21  0804 12/11/21  1159   PHOSPHORUS mg/dL 2.6 2.6             Results from last 7 days   Lab Units 12/14/21  0508 12/13/21  0804 12/12/21  0521 12/11/21  1159 12/10/21  0456   WBC 10*3/mm3 15.33* 13.16* 8.73 7.99 6.55   HEMOGLOBIN g/dL 9.1* 9.9* 10.0* 9.1* 8.7*   PLATELETS 10*3/mm3 316 291 303 296 265             Assessment / Plan     ASSESSMENT:  1.  End-stage renal disease on maintenance in center hemodialysis on a Monday Wednesday Friday schedule.    2.  Hypertension with end-stage renal disease: Blood pressure controlled.  3.  Acute hypoxemic respiratory failure secondary to COVID-19 pneumonia versus volume overload.  4.  Hyperphosphatemia on binders now with hypophosphatemia we will start binders  5.  COVID-19 pneumonia.  6.  History of CVA  7.Diabetes mellitus type 2 diet controlled now.  8.  Anemia: Likely secondary to end-stage renal disease.  Iron panel not in favor of iron deficiency anemia  9. Hypokalemia: we will replace        Plan:  HD aT am   Surveillance labs    Thank you for involving us in the care of Carolina Bhat.  Please feel free to call with any questions.    Agustin Almanzar MD  12/14/21  15:50 EST    [facility]  [phone]      Much of this encounter note is an electronic transcription/translation of spoken language to printed text. The electronic translation of spoken language may  permit erroneous, or at times, nonsensical words or phrases to be inadvertently transcribed; Although I have reviewed the note for such errors, some may still exist.

## 2021-12-14 NOTE — PLAN OF CARE
"Goal Outcome Evaluation:  Plan of Care Reviewed With: patient        Progress: no change  Outcome Summary: Patient reported bilateral buttock pain from pressure ulcer wound.   Pain medication given per MD order.   Patient screamed out in a high pitched incomprehensible speech the entire shift.  When asked to talk normal, she would talk soft and state \"my bottom hurts\".  Repositioning, wound care and pain medication did not alleviate her mood.  Hemodialysis performed during the morning hours. VSS.  WCAVERY.  "

## 2021-12-14 NOTE — PROGRESS NOTES
"Adult Nutrition  Assessment/PES    Patient Name:  Carolina Bhat  YOB: 1960  MRN: [id number]  Admit Date:  12/9/2021    Assessment Date:  12/14/2021    Comments:  Nutrition assessment completed per skin issues and diagnosis. Pt with COVID 19, CKD on dialysis, dementia, CAD, and DM. Pt with stage 2 Pressure injury to Coccyx. Diet Dysphagia Pureed, HTL, no straws. Po intake down at this time with 0-25%, plus taking some snacks. Will add Magic cup supplements with meals and continue to monitor po intake in efforts to meets needs and wound healing.      Reason for Assessment     Row Name 12/14/21 6203          Reason for Assessment    Reason For Assessment identified at risk by screening criteria     Diagnosis --  COVID 19, DM, CKD on dialysis, CAD, dementia     Identified At Risk by Screening Criteria MST SCORE 2+; large or nonhealing wound, burn or pressure injury                Nutrition/Diet History     Row Name 12/14/21 1337          Nutrition/Diet History    Typical Food/Fluid Intake po 0-25%, 100% snack                Anthropometrics     Row Name 12/14/21 1332 12/14/21 0545       Anthropometrics    Height 177 cm (69.69\") --    Weight 88.9 kg (196 lb)  not weighed by RD 88.9 kg (196 lb)       Ideal Body Weight (IBW)    Ideal Body Weight (IBW) (kg) 68 --    % Ideal Body Weight 130.74 --       Body Mass Index (BMI)    BMI (kg/m2) 28.44 --    BMI Assessment BMI 25-29.9: overweight --               Labs/Tests/Procedures/Meds     Row Name 12/14/21 0046          Labs/Procedures/Meds    Lab Results Reviewed reviewed     Lab Results Comments na, glu, bun, cr, alb            Diagnostic Tests/Procedures    Diagnostic Test/Procedure Reviewed reviewed            Medications    Pertinent Medications Reviewed reviewed     Pertinent Medications Comments decadron, pepcid, heparin, insulin, keppra, remdesivir                Physical Findings     Row Name 12/14/21 7573          Physical Findings    Overall " "Physical Appearance on oxygen therapy     Skin poor skin integrity/turgor; pressure injury                Estimated/Assessed Needs     Row Name 12/14/21 1339 12/14/21 1338       Calculation Measurements    Weight Used For Calculations 88.9 kg (195 lb 15.8 oz) --    Height -- 177 cm (69.69\")       Estimated/Assessed Needs    Additional Documentation KCAL/KG (Group); Fluid Requirements (Group); Protein Requirements (Group) --       KCAL/KG    KCAL/KG 30 Kcal/Kg (kcal) --    30 Kcal/Kg (kcal) 2667 --       Protein Requirements    Weight Used For Protein Calculations 88.9 kg (195 lb 15.8 oz) --    Est Protein Requirement Amount (gms/kg) 1.2 gm protein --    Estimated Protein Requirements (gms/day) 106.68 --       Fluid Requirements    Fluid Requirements (mL/day) 2667 --    RDA Method (mL) 2667 --               Nutrition Prescription Ordered     Row Name 12/14/21 1340          Nutrition Prescription PO    Current PO Diet Dysphagia     Dysphagia Level 2  Pureed     Fluid Consistency Honey thick     Other Modifiers No Straws                       Problem/Interventions:   Problem 1     Row Name 12/14/21 1340          Nutrition Diagnoses Problem 1    Problem 1 Increased Nutrient Needs     Etiology (related to) Medical Diagnosis     Infectious Disease Other (comment)  covid 19     Skin Pressure injury                      Intervention Goal     Row Name 12/14/21 1342          Intervention Goal    General Disease management/therapy; Reduce/improve symptoms; Meet nutritional needs for age/condition; Maintain nutrition     PO Increase intake; Tolerate PO; PO intake (%)     PO Intake % 80 %     Weight Maintain weight                Nutrition Intervention     Row Name 12/14/21 1342          Nutrition Intervention    RD/Tech Action Follow Tx progress; Care plan reviewd; Encourage intake                Nutrition Prescription     Row Name 12/14/21 1342          Nutrition Prescription PO    PO Prescription Begin/change supplement     " Supplement Magic Cup     Supplement Frequency 3 times a day                Education/Evaluation     Row Name 12/14/21 9290          Education    Education Will Instruct as appropriate            Monitor/Evaluation    Monitor Per protocol                 Electronically signed by:  Letty Pantoja RD  12/14/21 13:42 EST

## 2021-12-14 NOTE — PROGRESS NOTES
"  Infectious Diseases Progress Note    Jack Baird MD     [facility]  [place]: 4 days  Patient Identification:  Name: Carolina Bhat  Age: 61 y.o.  Sex: female  :  1960  MRN: [id number]         Primary Care Physician: Provider, No Known            Subjective: Feels about the same.  Interval History: See consultation note  Oxygen requirement seems to be declining and not requiring 15 L high flow nasal which is worse than 6 L nasal cannula earlier  Objective:    Scheduled Meds:atorvastatin, 60 mg, Oral, Nightly  carvedilol, 6.25 mg, Oral, BID With Meals  dexamethasone, 6 mg, Intravenous, Q24H  famotidine, 20 mg, Intravenous, Q12H  heparin (porcine), 5,000 Units, Subcutaneous, Q12H  insulin glargine, 10 Units, Subcutaneous, Nightly  insulin lispro, 0-7 Units, Subcutaneous, TID AC  levETIRAcetam, 500 mg, Oral, BID  remdesivir, 100 mg, Intravenous, Q24H  sodium hypochlorite, , Topical, Q12H      Continuous Infusions:     Vital signs in last 24 hours:  Temp:  [97.1 °F (36.2 °C)-97.5 °F (36.4 °C)] 97.1 °F (36.2 °C)  Heart Rate:  [74-90] 74  Resp:  [18-20] 20  BP: (111-141)/(65-90) 141/78    Intake/Output:  No intake or output data in the 24 hours ending 21 2207    Exam:  /78 (BP Location: Left arm, Patient Position: Lying)   Pulse 74   Temp 97.1 °F (36.2 °C) (Oral)   Resp 20   Ht 177 cm (69.69\")   Wt 89 kg (196 lb 1.6 oz)   SpO2 99%   BMI 28.39 kg/m²   Patient is examined using the personal protective equipment as per guidelines from infection control for this particular patient as enacted.  Hand washing was performed before and after patient interaction.  General Appearance:   Chronically ill-appearing female who does not appear to be in any acute distress   Head:    Normocephalic, without obvious abnormality, atraumatic   Eyes:    PERRL, conjunctivae/corneas clear, EOM's intact, both eyes   Ears:    Normal external ear canals, both ears   Nose:   Nares normal, septum midline, " mucosa normal, no drainage    or sinus tenderness   Throat:   Lips, tongue, gums normal; oral mucosa pink and moist   Neck:   Supple, symmetrical, trachea midline, no adenopathy;     thyroid:  no enlargement/tenderness/nodules; no carotid    bruit or JVD   Back:     Symmetric, no curvature, ROM normal, no CVA tenderness   Lungs:     Clear to auscultation bilaterally, respirations unlabored   Chest Wall:    No tenderness or deformity    Heart:    Regular rate and rhythm, S1 and S2 normal, no murmur, rub   or gallop   Abdomen:     Soft, non-tender, bowel sounds active all four quadrants,     no masses, no hepatomegaly, no splenomegaly   Extremities:  Contractures of lower extremities.  Right upper arm AV fistula in place   Pulses:   Pulses palpable in all extremities; symmetric all extremities   Skin:   Skin color normal, Skin is warm and dry,  no rashes or palpable lesions   Neurologic:  Residual neurological deficit with left-sided weakness            Data Review:    I reviewed the patient's new clinical results.  Results from last 7 days   Lab Units 12/13/21  0804 12/12/21  0521 12/11/21  1159 12/10/21  0456 12/09/21  1646   WBC 10*3/mm3 13.16* 8.73 7.99 6.55 6.10   HEMOGLOBIN g/dL 9.9* 10.0* 9.1* 8.7* 10.3*   PLATELETS 10*3/mm3 291 303 296 265 336     Results from last 7 days   Lab Units 12/13/21  0804 12/12/21  0521 12/11/21  1159 12/10/21  0456 12/09/21  1802   SODIUM mmol/L 137 137 139 135* 137   POTASSIUM mmol/L 4.6 3.4* 3.5 4.2 5.0   CHLORIDE mmol/L 97* 97* 97* 97* 97*   CO2 mmol/L 22.2 23.5 26.2 19.3* 24.9   BUN mg/dL 61* 44* 32* 58* 50*   CREATININE mg/dL 5.67* 4.55* 3.67* 6.12* 5.79*   CALCIUM mg/dL 9.7 9.7 9.5 9.3 9.4   GLUCOSE mg/dL 200* 238* 156* 197* 165*         Assessment:    Pneumonia due to COVID-19 virus  1-systemic COVID-19 infection with  2-COVID-19 pneumonia with hypoxia further confounded by  3-possible volume overload in the setting of end-stage renal disease on hemodialysis  4-history of  CVA  5-vascular dementia  6-diabetes mellitus  7-immobilization syndrome        Recommendations/Discussions:  · Continue supportive care  · Management of end-stage renal disease for primary team and nephrology service  · Continuedexamethasone and remdesivir with close monitoring of her clinical course in terms of need for oxygen supplementation and sense of wellbeing.  Patient would benefit from aggressive volume management and dialysis as scheduled prevent volume overload.  Follow-up on her inflammatory markers.  · Her decline and increased oxygen requirement is concerning and would consult pharmacy to see if she qualifies and meets the criteria for baricitinib.    Jack Baird MD  12/13/2021  13:40  Much of this encounter note is an electronic transcription/translation of spoken language to printed text. The electronic translation of spoken language may permit erroneous, or at times, nonsensical words or phrases to be inadvertently transcribed; Although I have reviewed the note for such errors, some may still exist

## 2021-12-14 NOTE — PROGRESS NOTES
DAILY PROGRESS NOTE  [facility], [facility]    2021    Patient Identification:  Name: Carolina Bhat  Age: 61 y.o.  Sex: female  :  1960  MRN: [id number]           Primary Care Physician: Provider, No Known    Subjective:    Interval History:    Unfortunately, patient's oxygen saturation sensor is on her head which she moves it all the time and he is on an acute reading.  I function again with 14-15 L of oxygen but when patient stays still, she only needed 4 L oxygen with saturation of 92-93%.  I have instructed the nurse to place oxygen saturation sensor in the finger on the left upper extremity so we will have a accurate reading of her oxygen saturation.  Not longer have SVT. Cardiology following  Pain appears to be controlled on current regimen  Patient is day 14 after becoming positive for COVID-19 infection.    ROS:     No nausea, vomiting, diarrhea, constipation, chest pain    Objective:    Scheduled Meds:  atorvastatin, 60 mg, Oral, Nightly  carvedilol, 6.25 mg, Oral, BID With Meals  dexamethasone, 6 mg, Intravenous, Q24H  famotidine, 20 mg, Intravenous, Q12H  heparin (porcine), 5,000 Units, Subcutaneous, Q12H  insulin glargine, 10 Units, Subcutaneous, Nightly  insulin lispro, 0-7 Units, Subcutaneous, TID AC  levETIRAcetam, 500 mg, Oral, BID  remdesivir, 100 mg, Intravenous, Q24H  sodium hypochlorite, , Topical, Q12H        Continuous Infusions:       PRN Meds:  •  acetaminophen  •  dextrose  •  dextrose  •  glucagon (human recombinant)  •  HYDROcodone-acetaminophen  •  ondansetron  •  [COMPLETED] Insert peripheral IV **AND** sodium chloride    Intake/Output:    Intake/Output Summary (Last 24 hours) at 2021 0939  Last data filed at 2021 0854  Gross per 24 hour   Intake 120 ml   Output 1000 ml   Net -880 ml         Exam:    T MAX 24 hrs: Temp (24hrs), Av.6 °F (36.4 °C), Min:97.1 °F (36.2 °C), Max:98 °F (36.7 °C)    Vitals Ranges:  "  Temp:  [97.1 °F (36.2 °C)-98 °F (36.7 °C)] 98 °F (36.7 °C)  Heart Rate:  [74-81] 81  Resp:  [18-20] 18  BP: (137-141)/(78-81) 137/81    /81 (BP Location: Left arm, Patient Position: Lying)   Pulse 81   Temp 98 °F (36.7 °C) (Oral)   Resp 18   Ht 177 cm (69.69\")   Wt 88.9 kg (196 lb)   SpO2 91%   BMI 28.38 kg/m²     General: Alert, oriented x1-2.  Cooperative.  Complaining of pain in her buttocks.    Neck: Supple, symmetrical, trachea midline, no adenopathy;              thyroid:  no enlargement/tenderness/nodules;              no carotid bruit or JVD  Cardiovascular: Normal rate, regular rhythm and intact distal pulses.              Exam reveals no gallop and no friction rub. No murmur heard  Pulmonary: Clear to auscultation bilaterally, respirations unlabored.               No rhonchi, wheezing or rales.   Abdominal: Soft. Soft, nontender, bowel sounds active all four quadrants,     no masses, no hepatomegaly, no splenomegaly.   Extremities: Left hand contraction.    Neurological: Patient is awake oriented x2.  No new focal sensorimotor deficit.    Skin: Wound in sacral area covered with dressings.        Data Review:    Results from last 7 days   Lab Units 12/14/21  0508 12/13/21  0804 12/12/21  0521   WBC 10*3/mm3 15.33* 13.16* 8.73   HEMOGLOBIN g/dL 9.1* 9.9* 10.0*   HEMATOCRIT % 27.3* 30.0* 30.5*   PLATELETS 10*3/mm3 316 291 303       Results from last 7 days   Lab Units 12/14/21  0508 12/13/21  0804 12/12/21  0521   SODIUM mmol/L 134* 137 137   POTASSIUM mmol/L 5.0 4.6 3.4*   CHLORIDE mmol/L 94* 97* 97*   CO2 mmol/L 23.3 22.2 23.5   BUN mg/dL 72* 61* 44*   CREATININE mg/dL 5.91* 5.67* 4.55*   CALCIUM mg/dL 9.6 9.7 9.7   BILIRUBIN mg/dL 0.3 0.3 0.4   ALK PHOS U/L 108 101 98   ALT (SGPT) U/L 19 21 20   AST (SGOT) U/L 18 25 33*   GLUCOSE mg/dL 179* 200* 238*           Results from last 7 days   Lab Units 12/09/21  1646   HEMOGLOBIN A1C % 5.70*       Lab Results   Lab Value Date/Time    TROPONINT " 3.070 (C) 12/14/2021 0508    TROPONINT 1.840 (C) 12/12/2021 0521    TROPONINT 2.020 (C) 12/11/2021 1159    TROPONINT 1.210 (C) 12/10/2021 0456    TROPONINT 0.464 (C) 12/09/2021 1646    TROPONINT 0.037 (C) 02/17/2021 1435    TROPONINT 0.033 (C) 02/17/2021 1216    TROPONINT 0.045 (H) 01/22/2019 1255    TROPONINT 0.052 (H) 01/22/2019 0357    TROPONINT 0.051 (H) 01/21/2019 1108    TROPONINT 0.073 (H) 06/13/2016 0640    TROPONINT 0.02 09/30/2015 0258       Microbiology Results (last 10 days)     Procedure Component Value - Date/Time    CANDIDA AURIS SCREEN - Swab, Axilla Right, Axilla Left and Groin [[id number]]  (Normal) Collected: 12/10/21 0928    Lab Status: Preliminary result Specimen: Swab from Axilla Right, Axilla Left and Groin Updated: 12/13/21 1700     Candida Auris Screen Culture No Candida auris isolated at 3 days    Blood Culture - Blood, Arm, Right [[id number]]  (Normal) Collected: 12/09/21 2032    Lab Status: Preliminary result Specimen: Blood from Arm, Right Updated: 12/13/21 2045     Blood Culture No growth at 4 days    Blood Culture - Blood, Arm, Left [[id number]]  (Abnormal) Collected: 12/09/21 1818    Lab Status: Preliminary result Specimen: Blood from Arm, Left Updated: 12/14/21 0823     Blood Culture Abnormal Stain     Gram Stain Anaerobic Bottle Gram positive bacilli    Narrative:      Blood culture does not meet the specified criteria for PCR identification.  If pregnant, immunocompromised, or clinical concern for meningitis, call lab to run BCID for Listeria monocytogenes.    Respiratory Panel PCR w/COVID-19(SARS-CoV-2) CHAYITO/JESSIE/HIMANSHU/PAD/COR/MAD/KEMAR In-House, NP Swab in UTM/VTM, 3-4 HR TAT - Swab, Nasopharynx [[id number]]  (Abnormal) Collected: 12/09/21 1804    Lab Status: Final result Specimen: Swab from Nasopharynx Updated: 12/09/21 1911     ADENOVIRUS, PCR Not Detected     Coronavirus 229E Not Detected     Coronavirus HKU1 Not Detected     Coronavirus NL63 Not Detected     Coronavirus OC43 Not  Detected     COVID19 Detected     Human Metapneumovirus Not Detected     Human Rhinovirus/Enterovirus Not Detected     Influenza A PCR Not Detected     Influenza B PCR Not Detected     Parainfluenza Virus 1 Not Detected     Parainfluenza Virus 2 Not Detected     Parainfluenza Virus 3 Not Detected     Parainfluenza Virus 4 Not Detected     RSV, PCR Not Detected     Bordetella pertussis pcr Not Detected     Bordetella parapertussis PCR Not Detected     Chlamydophila pneumoniae PCR Not Detected     Mycoplasma pneumo by PCR Not Detected    Narrative:      In the setting of a positive respiratory panel with a viral infection PLUS a negative procalcitonin without other underlying concern for bacterial infection, consider observing off antibiotics or discontinuation of antibiotics and continue supportive care. If the respiratory panel is positive for atypical bacterial infection (Bordetella pertussis, Chlamydophila pneumoniae, or Mycoplasma pneumoniae), consider antibiotic de-escalation to target atypical bacterial infection.           Imaging Results (Last 7 Days)     Procedure Component Value Units Date/Time    XR Chest 1 View [[id number]] Collected: 12/09/21 1728     Updated: 12/09/21 2107    Narrative:      EXAMINATION: SINGLE VIEW CHEST RADIOGRAPH     HISTORY: 60-year-old female with a history of Covid and shortness of  air.     FINDINGS: An AP portable chest radiograph was obtained. Comparison is  made to a chest radiograph dated 02/17/2021. There is hazy opacification  seen throughout both lungs with interstitial opacification in both  lungs, left worse than right. The cardiac silhouette is mildly enlarged.  The visualized osseous structures appear normal.       Impression:      There is evidence of bilateral interstitial and ground-glass  infiltrates consistent with bilateral pneumonia and/or pulmonary edema.  Mild cardiomegaly is noted.     This report was finalized on 12/9/2021 9:04 PM by Dr. Griffith  ALYCIA Chong.               Assessment:        Pneumonia due to COVID-19 virus  Acute respiratory failure with hypoxia  Volume overload  End-stage renal disease on dialysis  Elevated troponin.  Diabetes mellitus 2  Anemia, chronic kidney disease  Seizure disorder  Coronary artery disease  Hypertension  Hypokalemia        Plan:    Continue oxygen supplementation to achieve saturation of 90-92%.  Needs to have acute breathing of oxygen saturation.  Continue supportive therapy for COVID-19 pneumonia.  In addition to remdesivir as well as dexamethasone.  ID following.  Leukocytosis probably from dexamethasone.  Ferritin, trending down, D-dimer trending down,  Hemodialysis per renal  Elevated troponin, cardiology following.  No acute cardiovascular event at this time.  Adjust insulin as needed, blood sugar okay  Monitor mental status, at baseline  Continue DVT/stress ulcer prophylaxis  Labs in am      Rhys Mary MD  12/14/2021           I wore protective equipment throughout this patient encounter including a face mask, gloves, and protective eyewear.  Hand hygiene was performed before donning protective equipment and after removal when leaving the room.

## 2021-12-14 NOTE — NURSING NOTE
Hemodialysis completed.Patient awake the whole time and making incomprehensible sounds ,UF-1L only due to episode of hypotension.Post vs as atczize-UQ-624/95 mmhg,HR-90,R-18.No bleeding from Avf site.Report given to staff.

## 2021-12-14 NOTE — PROGRESS NOTES
[facility]  Cardiology Progress Note    Patient Identification:  Name: Carolina Bhat  Age: 61 y.o.  Sex: female  :  1960  MRN: [id number]                 Follow Up / Chief Complaint: Follow up for elevated troponin, SVT    Interval History: 6 beats of SVT noted overnight.  Sinus rhythm on the monitor with heart rate controlled.  She remains on high flow oxygen.  Troponin increased after dialysis.       Subjective: CDC per protocol.  Denies chest pain and shortness of breath.        Objective:    Past Medical History:  Past Medical History:   Diagnosis Date   • Acid reflux    • Anemia    • Anxiety    • Bacteremia    • Chest pain    • Chronic constipation    • Chronic pain    • Coronary artery disease    • CVA (cerebral vascular accident) ([facility]) ,    • Dementia ([facility])    • Depression    • DM (diabetes mellitus) type II controlled with renal manifestation ([facility])    • Dyslipidemia    • Dysphagia     G-tube removed    • Epilepsy ([facility])    • ESRD (end stage renal disease) ([facility]) 2016    Started HD   [facility] T,Th,Sa   • Flexion contractures    • Hard to intubate     pt does not know   • History of esophagitis    • HLD (hyperlipidemia)    • Hypertension    • 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 ([facility]) 2017   • Myocardial infarction ([facility])    • Nausea & vomiting    • Open angle glaucoma with borderline intraocular pressure, bilateral 2017   • Pseudobulbar affect    • Venous insufficiency (chronic) (peripheral)      Past Surgical History:  Past Surgical History:   Procedure Laterality Date   • ARTERIOVENOUS FISTULA/SHUNT SURGERY Right 3/21/2016    Procedure: RT BRACHIAL CEPHALIC FISTULA;  Surgeon: Alla Price Jr., MD;  Location: [facility];  Service:    • BREAST BIOPSY          Social History:   Social History     Tobacco Use   • Smoking status: Former Smoker   • Smokeless tobacco: Former User   Substance Use Topics   •  Alcohol use: No      Family History:  History reviewed. No pertinent family history.       Allergies:  Allergies   Allergen Reactions   • Darvon [Propoxyphene] Confusion     Scheduled Meds:  atorvastatin, 60 mg, Nightly  carvedilol, 6.25 mg, BID With Meals  dexamethasone, 6 mg, Q24H  famotidine, 20 mg, Q12H  heparin (porcine), 5,000 Units, Q12H  insulin glargine, 10 Units, Nightly  insulin lispro, 0-7 Units, TID AC  levETIRAcetam, 500 mg, BID  remdesivir, 100 mg, Q24H  sodium hypochlorite, , Q12H            INTAKE AND OUTPUT:    Intake/Output Summary (Last 24 hours) at 12/14/2021 1053  Last data filed at 12/14/2021 0854  Gross per 24 hour   Intake 120 ml   Output 1000 ml   Net -880 ml       Review of Systems: [facility] per protocol  GI: Denies nausea and vomiting  Cardiac: Denies chest pain and palpitations  Pulmonary: Improvement shortness of breath.    Constitutional:  Temp:  [97.1 °F (36.2 °C)-98 °F (36.7 °C)] 98 °F (36.7 °C)  Heart Rate:  [74-81] 81  Resp:  [18-20] 18  BP: (137-141)/(78-81) 137/81    Physical Exam:  General:  Appears in no acute distress, resting in bed  Eyes: EOM normal no conjunctival drainage  HEENT:  No JVD. Thyroid not visibly enlarged. No mucosal pallor or cyanosis  Respiratory: Respirations regular and unlabored at rest. BBS with good air entry in all fields. No crackles, rubs or wheezes auscultated  Cardiovascular: S1S2 Regular rate and rhythm. No murmur, rub or gallop. No carotid bruits. DP/PT pulses  2+   . No pretibial pitting edema  Gastrointestinal: Abdomen soft,  non tender. Bowel sounds present. No hepatosplenomegaly. No ascites   Skin:   Skin warm and dry to touch. No rashes    Neuro: AAO x3 CN II-XII grossly intact  Psych: Mood and affect normal, pleasant and cooperative          Cardiographics  Telemetry:       Sinus rhythm      ECG:             Echocardiogram:   Interpretation Summary    · Technically limited study  · Left ventricular systolic function is normal. Calculated EF =  69.0%. Estimated EF was in agreement with the calculated EF. Estimated EF = 69%. Normal left ventricular cavity size noted. All left ventricular wall segments contract normally. Left ventricular wall thickness is consistent with mild concentric hypertrophy. Left ventricular diastolic dysfunction is noted (grade I) consistent with impaired relaxation.  · Trace mitral valve regurgitation is present.  · Trace tricuspid valve regurgitation is present. Estimated right ventricular systolic pressure from tricuspid regurgitation is normal (<35 mmHg).      2016    Conclusions: Technically difficult study due to patient position and cooperation.Bilateral internal carotid arteries with plaque but <50% stenosis.Bilateral vertebral arteries with antegrade flow.No prior examination for comparison.      Lab Review   Results from last 7 days   Lab Units 12/14/21  0508 12/12/21  0521 12/11/21  1159   TROPONIN T ng/mL 3.070* 1.840* 2.020*         Results from last 7 days   Lab Units 12/14/21  0508   SODIUM mmol/L 134*   POTASSIUM mmol/L 5.0   BUN mg/dL 72*   CREATININE mg/dL 5.91*   CALCIUM mg/dL 9.6        Results from last 7 days   Lab Units 12/14/21  0508 12/13/21  0804 12/12/21  0521   WBC 10*3/mm3 15.33* 13.16* 8.73   HEMOGLOBIN g/dL 9.1* 9.9* 10.0*   HEMATOCRIT % 27.3* 30.0* 30.5*   PLATELETS 10*3/mm3 316 291 303         The following medical decision was discussed in detail with Dr. Roach      Assessment/Plan:    1.  Pneumonia due to COVID-19 virus  2.  SVT: None noted overnight.  3.  CKD: On dialysis, defer to nephrology  4.  Diabetes mellitus: Defer to IM  5.  Elevated troponin.     Blood pressure stable.  Dialysis last night.  Troponin elevated in the setting of dialysis.  Probably a type II non-STEMI.  Echo once out of isolation we will continue to trend EKG and troponin.  Continue statin, and Coreg.    Increase Coreg, troponin, and EKG      )12/14/2021  HEYDI Orozco Dragon/Transcription:  "  \"Dictated utilizing Dragon dictation\".     "

## 2021-12-14 NOTE — PROGRESS NOTES
[facility]  Clinical Pharmacy Department     Pharmacy Consult: Baricitinib Dosing    Patient does not meet criteria for treatment with baricitinib due to hemodialysis.  The EUA for baricitinib usage for COVID treatment recommends against use in patients who are on dialysis.  These patients were also excluded from the ACTT-1 and ACTT-2 trial used to determine safety and effectiveness of baricitinib with remdesivir for hospitalized adults with Covid-19.      Thank you for involving pharmacy in this patient's care    Nik Christensen PharmD

## 2021-12-14 NOTE — PLAN OF CARE
"Goal Outcome Evaluation:  Plan of Care Reviewed With: patient        Progress: no change   Patient has been confused and frequently cries and whines but when you go in to check on patient she says she is \"fine.\" Dr. Mary confirmed patient is the same way at the NH so this is her baseline. Last dose of Remdesivir is today. Patient still with poor PO intake. Pain controlled with PRN pain medication. WCTM  "

## 2021-12-15 NOTE — PLAN OF CARE
Problem: Fall Injury Risk  Goal: Absence of Fall and Fall-Related Injury  Outcome: Ongoing, Progressing  Intervention: Promote Injury-Free Environment  Recent Flowsheet Documentation  Taken 12/15/2021 1345 by Stu Coyle RN  Safety Promotion/Fall Prevention:   assistive device/personal items within reach   fall prevention program maintained   nonskid shoes/slippers when out of bed   safety round/check completed  Taken 12/15/2021 1200 by Stu Coyle RN  Safety Promotion/Fall Prevention:   assistive device/personal items within reach   fall prevention program maintained   nonskid shoes/slippers when out of bed   safety round/check completed  Taken 12/15/2021 1001 by Stu Coyle RN  Safety Promotion/Fall Prevention:   assistive device/personal items within reach   fall prevention program maintained   nonskid shoes/slippers when out of bed   safety round/check completed  Taken 12/15/2021 0831 by Stu Coyle RN  Safety Promotion/Fall Prevention:   assistive device/personal items within reach   fall prevention program maintained   nonskid shoes/slippers when out of bed   safety round/check completed     Problem: Fluid Imbalance (Pneumonia)  Goal: Fluid Balance  Outcome: Ongoing, Progressing     Problem: Infection (Pneumonia)  Goal: Resolution of Infection Signs and Symptoms  Outcome: Ongoing, Progressing  Intervention: Prevent Infection Progression  Recent Flowsheet Documentation  Taken 12/15/2021 1200 by Stu Coyle RN  Isolation Precautions:   contact precautions maintained   droplet precautions maintained  Taken 12/15/2021 0831 by Stu Coyle RN  Isolation Precautions:   contact precautions maintained   droplet precautions maintained     Problem: Respiratory Compromise (Pneumonia)  Goal: Effective Oxygenation and Ventilation  Outcome: Ongoing, Progressing  Intervention: Optimize Oxygenation and Ventilation  Recent Flowsheet Documentation  Taken 12/15/2021 1345 by Stu Coyle  RN  Head of Bed (HOB): HOB at 30-45 degrees  Taken 12/15/2021 1200 by Stu Coyle RN  Head of Bed (HOB): HOB at 45 degrees  Taken 12/15/2021 1001 by Stu Coyle RN  Head of Bed (HOB): HOB at 45 degrees  Taken 12/15/2021 0831 by Stu Coyle RN  Head of Bed (HOB): HOB at 45 degrees     Problem: Infection  Goal: Infection Symptom Resolution  Outcome: Ongoing, Progressing  Intervention: Prevent or Manage Infection  Recent Flowsheet Documentation  Taken 12/15/2021 1200 by Stu Coyle RN  Isolation Precautions:   contact precautions maintained   droplet precautions maintained  Taken 12/15/2021 0831 by Stu Coyle RN  Isolation Precautions:   contact precautions maintained   droplet precautions maintained     Problem: Gas Exchange Impaired  Goal: Optimal Gas Exchange  Outcome: Ongoing, Progressing  Intervention: Optimize Oxygenation and Ventilation  Recent Flowsheet Documentation  Taken 12/15/2021 1345 by Stu Coyle RN  Head of Bed ([facility]): HOB at 30-45 degrees  Taken 12/15/2021 1200 by Stu Coyel RN  Head of Bed (HOB): HOB at 45 degrees  Taken 12/15/2021 1001 by Stu Coyle RN  Head of Bed (HOB): HOB at 45 degrees  Taken 12/15/2021 0831 by Stu Coyle RN  Head of Bed ([facility]): HOB at 45 degrees     Problem: Wound  Goal: Optimal Wound Healing  Outcome: Ongoing, Progressing     Problem: Adult Inpatient Plan of Care  Goal: Plan of Care Review  Outcome: Ongoing, Progressing  Flowsheets (Taken 12/15/2021 1434)  Progress: improving  Plan of Care Reviewed With: patient  Outcome Summary: Patient treated for pain. No nausea or SOA noted. Patient on 2L O2 but will take it off continually. O2 drops while sleeping. Plan back to nursing home tomorrow is ok with everyone. Hemodialysis planned for today. [facility] called and they will not to HD untill sometime after 1500. Patient is tachy at times. AOx1, 2L O2/room air, turn Q2. Will continue to monitor and assist patient as  needed.  Goal: Patient-Specific Goal (Individualized)  Outcome: Ongoing, Progressing  Goal: Absence of Hospital-Acquired Illness or Injury  Outcome: Ongoing, Progressing  Intervention: Identify and Manage Fall Risk  Recent Flowsheet Documentation  Taken 12/15/2021 1345 by Stu Coyle RN  Safety Promotion/Fall Prevention:   assistive device/personal items within reach   fall prevention program maintained   nonskid shoes/slippers when out of bed   safety round/check completed  Taken 12/15/2021 1200 by Stu Coyle RN  Safety Promotion/Fall Prevention:   assistive device/personal items within reach   fall prevention program maintained   nonskid shoes/slippers when out of bed   safety round/check completed  Taken 12/15/2021 1001 by Stu Coyle RN  Safety Promotion/Fall Prevention:   assistive device/personal items within reach   fall prevention program maintained   nonskid shoes/slippers when out of bed   safety round/check completed  Taken 12/15/2021 0831 by Stu Coyle RN  Safety Promotion/Fall Prevention:   assistive device/personal items within reach   fall prevention program maintained   nonskid shoes/slippers when out of bed   safety round/check completed  Intervention: Prevent Skin Injury  Recent Flowsheet Documentation  Taken 12/15/2021 1345 by Stu Coyle RN  Body Position: tilted, right  Skin Protection:   tubing/devices free from skin contact   incontinence pads utilized  Taken 12/15/2021 1200 by Stu Coyle RN  Body Position: tilted, left  Taken 12/15/2021 1001 by Stu Coyle RN  Body Position: tilted, right  Taken 12/15/2021 0831 by Stu Coyle RN  Body Position: tilted, right  Skin Protection:   tubing/devices free from skin contact   incontinence pads utilized  Intervention: Prevent Infection  Recent Flowsheet Documentation  Taken 12/15/2021 1200 by Stu Coyle RN  Infection Prevention: personal protective equipment utilized  Taken 12/15/2021 0831 by  Stu Coyle RN  Infection Prevention: personal protective equipment utilized  Goal: Optimal Comfort and Wellbeing  Outcome: Ongoing, Progressing  Goal: Readiness for Transition of Care  Outcome: Ongoing, Progressing     Problem: Skin Injury Risk Increased  Goal: Skin Health and Integrity  Outcome: Ongoing, Progressing  Intervention: Optimize Skin Protection  Recent Flowsheet Documentation  Taken 12/15/2021 1345 by Stu Coyle RN  Pressure Reduction Techniques: frequent weight shift encouraged  Head of Bed (HOB): HOB at 30-45 degrees  Pressure Reduction Devices: pressure-redistributing mattress utilized  Skin Protection:   tubing/devices free from skin contact   incontinence pads utilized  Taken 12/15/2021 1200 by Stu Coyle RN  Head of Bed (HOB): HOB at 45 degrees  Taken 12/15/2021 1001 by Stu Coyle RN  Head of Bed (HOB): HOB at 45 degrees  Taken 12/15/2021 0831 by Stu Coyle RN  Pressure Reduction Techniques:   frequent weight shift encouraged   weight shift assistance provided  Head of Bed (HOB): HOB at 45 degrees  Pressure Reduction Devices: pressure-redistributing mattress utilized  Skin Protection:   tubing/devices free from skin contact   incontinence pads utilized     Problem: Diabetes Comorbidity  Goal: Blood Glucose Level Within Desired Range  Outcome: Ongoing, Progressing     Problem: Hypertension Comorbidity  Goal: Blood Pressure in Desired Range  Outcome: Ongoing, Progressing     Problem: Pain Chronic (Persistent) (Comorbidity Management)  Goal: Acceptable Pain Control and Functional Ability  Outcome: Ongoing, Progressing   Goal Outcome Evaluation:  Plan of Care Reviewed With: patient        Progress: improving  Outcome Summary: Patient treated for pain. No nausea or SOA noted. Patient on 2L O2 but will take it off continually. O2 drops while sleeping. Plan back to nursing home tomorrow is ok with everyone. Hemodialysis planned for today. [facility] called and they will  not to HD untill sometime after 1500. Patient is tachy at times. AOx1, 2L O2/room air, turn Q2. Will continue to monitor and assist patient as needed.

## 2021-12-15 NOTE — PROGRESS NOTES
[facility]  Cardiology Progress Note    Patient Identification:  Name: Carolina Bhat  Age: 61 y.o.  Sex: female  :  1960  MRN: [id number]                 Follow Up / Chief Complaint: Follow up for elevated troponin, SVT    Interval History: Monitor reviewed no new events noted overnight. SR-ST on monitor. BP stable.       Subjective: CDC per protocol.  No chest pain      Objective:    Past Medical History:  Past Medical History:   Diagnosis Date    Acid reflux     Anemia     Anxiety     Bacteremia     Chest pain     Chronic constipation     Chronic pain     Coronary artery disease     CVA (cerebral vascular accident) ([facility]) ,     Dementia ([facility])     Depression     DM (diabetes mellitus) type II controlled with renal manifestation ([facility])     Dyslipidemia     Dysphagia     G-tube removed     Epilepsy ([facility])     ESRD (end stage renal disease) ([facility]) 2016    Started HD   [facility] T,Th,[facility]    Flexion contractures     Hard to intubate     pt does not know    History of esophagitis     HLD (hyperlipidemia)     Hypertension     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 ([facility]) 2017    Myocardial infarction ([facility])     Nausea & vomiting     Open angle glaucoma with borderline intraocular pressure, bilateral 2017    Pseudobulbar affect     Venous insufficiency (chronic) (peripheral)      Past Surgical History:  Past Surgical History:   Procedure Laterality Date    ARTERIOVENOUS FISTULA/SHUNT SURGERY Right 3/21/2016    Procedure: RT BRACHIAL CEPHALIC FISTULA;  Surgeon: Alla Price Jr., MD;  Location: [facility];  Service:     BREAST BIOPSY          Social History:   Social History     Tobacco Use    Smoking status: Former Smoker    Smokeless tobacco: Former User   Substance Use Topics    Alcohol use: No      Family History:  History reviewed. No pertinent family history.       Allergies:  Allergies   Allergen Reactions    Darvon  "[Propoxyphene] Confusion     Scheduled Meds:  atorvastatin, 60 mg, Nightly  carvedilol, 12.5 mg, BID With Meals  dexamethasone, 6 mg, Q24H  famotidine, 20 mg, Q12H  heparin (porcine), 5,000 Units, Q12H  insulin glargine, 10 Units, Nightly  insulin lispro, 0-7 Units, TID AC  levETIRAcetam, 500 mg, BID  sodium hypochlorite, , Q12H            INTAKE AND OUTPUT:    Intake/Output Summary (Last 24 hours) at 12/15/2021 1128  Last data filed at 12/15/2021 0820  Gross per 24 hour   Intake 600 ml   Output --   Net 600 ml       ROS  Constitutional: Awake and alert, no fever. No nosebleeds  Abdomen           no abdominal pain   Cardiac              no chest pain  Pulmonary          no shortness of breath      /71 (BP Location: Left arm, Patient Position: Lying)   Pulse 95   Temp 97.2 °F (36.2 °C) (Oral)   Resp 18   Ht 177 cm (69.69\")   Wt 89.4 kg (197 lb)   SpO2 92%   BMI 28.52 kg/m²   General appearance: No acute changes   Neck: Trachea midline; NECK, supple, no thyromegaly or lymphadenopathy   Lungs: Normal size and shape, normal breath sounds, equal distribution of air, no rales or rhonchi   CV: S1-S2 regular, no murmurs, no rub, no gallop   Abdomen: Soft, nontender; no masses , no abnormal abdominal sounds   Extremities: No deformity , normal color , no peripheral edema   Skin: Normal temperature, turgor and texture; no rash, ulcers            Cardiographics  Telemetry:       Sinus rhythm      ECG:                   Echocardiogram:   Interpretation Summary    Technically limited study  Left ventricular systolic function is normal. Calculated EF = 69.0%. Estimated EF was in agreement with the calculated EF. Estimated EF = 69%. Normal left ventricular cavity size noted. All left ventricular wall segments contract normally. Left ventricular wall thickness is consistent with mild concentric hypertrophy. Left ventricular diastolic dysfunction is noted (grade I) consistent with impaired relaxation.  Trace mitral " valve regurgitation is present.  Trace tricuspid valve regurgitation is present. Estimated right ventricular systolic pressure from tricuspid regurgitation is normal (<35 mmHg).      2016    Conclusions: Technically difficult study due to patient position and cooperation.Bilateral internal carotid arteries with plaque but <50% stenosis.Bilateral vertebral arteries with antegrade flow.No prior examination for comparison.      Lab Review   Results from last 7 days   Lab Units 12/14/21  0508 12/12/21  0521 12/11/21  1159   TROPONIN T ng/mL 3.070* 1.840* 2.020*         Results from last 7 days   Lab Units 12/14/21  0508   SODIUM mmol/L 134*   POTASSIUM mmol/L 5.0   BUN mg/dL 72*   CREATININE mg/dL 5.91*   CALCIUM mg/dL 9.6        Results from last 7 days   Lab Units 12/14/21  0508 12/13/21  0804 12/12/21  0521   WBC 10*3/mm3 15.33* 13.16* 8.73   HEMOGLOBIN g/dL 9.1* 9.9* 10.0*   HEMATOCRIT % 27.3* 30.0* 30.5*   PLATELETS 10*3/mm3 316 291 303         The following medical decision was discussed in detail with Dr. Roach      Assessment/Plan:    1.  Pneumonia due to COVID-19 virus  2.  SVT: No new events noted. SR-ST on monitor.   3.  CKD: ESRD on HD, nephrology following.   4.  Diabetes mellitus: defer to IM  5.  Elevated troponin: Troponin 3.810 today, likely Type II MI, elevation in the setting of ESRD and dialysis. Continue statin and coreg and medical management. Will need further ischemic work up once stable.     BP stable on increased coreg. No new events noted on monitor, she remains sr-st. No chest pain, continue medical management.  She will need echo once out of isolation. ECG and troponin in am.        )12/15/2021  HEYDI Serrano    Patient personally interviewed and above subjective findings personally confirmed during a face to face contact with patient today  All findings of physical examination confirmed  All pertinent and performed labs, cardiac procedures ,  radiographs of the last 24 hours  "personally reviewed  Impression and plans discussed/elaborated and implemented jointly as described above     Calixto Roach MD          EMR Dragon/Transcription:   \"Dictated utilizing Dragon dictation\".     "

## 2021-12-15 NOTE — PROGRESS NOTES
"  Infectious Diseases Progress Note    Jack Baird MD     [facility]  [place]: 5 days  Patient Identification:  Name: Carolina Bhat  Age: 61 y.o.  Sex: female  :  1960  MRN: [id number]         Primary Care Physician: Provider, No Known            Subjective: Confused and moves around.  Interval History: See consultation note  Oxygen requirement seems to have stabilized.  Objective:    Scheduled Meds:atorvastatin, 60 mg, Oral, Nightly  carvedilol, 12.5 mg, Oral, BID With Meals  dexamethasone, 6 mg, Intravenous, Q24H  famotidine, 20 mg, Intravenous, Q12H  heparin (porcine), 5,000 Units, Subcutaneous, Q12H  insulin glargine, 10 Units, Subcutaneous, Nightly  insulin lispro, 0-7 Units, Subcutaneous, TID AC  levETIRAcetam, 500 mg, Oral, BID  sodium hypochlorite, , Topical, Q12H      Continuous Infusions:     Vital signs in last 24 hours:  Temp:  [98 °F (36.7 °C)] 98 °F (36.7 °C)  Heart Rate:  [81] 81  Resp:  [16-18] 16  BP: (131-137)/(81-88) 131/88    Intake/Output:    Intake/Output Summary (Last 24 hours) at 2021 2141  Last data filed at 2021 1700  Gross per 24 hour   Intake 480 ml   Output 1000 ml   Net -520 ml       Exam:  /88 (BP Location: Left arm, Patient Position: Lying)   Pulse 81   Temp 98 °F (36.7 °C) (Oral)   Resp 16   Ht 177 cm (69.69\")   Wt 88.9 kg (196 lb) Comment: not weighed by RD  SpO2 91%   BMI 28.38 kg/m²   Patient is examined using the personal protective equipment as per guidelines from infection control for this particular patient as enacted.  Hand washing was performed before and after patient interaction.  General Appearance:   Chronically ill-appearing female who does not appear to be in any acute distress   Head:    Normocephalic, without obvious abnormality, atraumatic   Eyes:    PERRL, conjunctivae/corneas clear, EOM's intact, both eyes   Ears:    Normal external ear canals, both ears   Nose:   Nares normal, septum midline, mucosa normal, no " drainage    or sinus tenderness   Throat:   Lips, tongue, gums normal; oral mucosa pink and moist   Neck:   Supple, symmetrical, trachea midline, no adenopathy;     thyroid:  no enlargement/tenderness/nodules; no carotid    bruit or JVD   Back:     Symmetric, no curvature, ROM normal, no CVA tenderness   Lungs:     Clear to auscultation bilaterally, respirations unlabored   Chest Wall:    No tenderness or deformity    Heart:    Regular rate and rhythm, S1 and S2 normal, no murmur, rub   or gallop   Abdomen:     Soft, non-tender, bowel sounds active all four quadrants,     no masses, no hepatomegaly, no splenomegaly   Extremities:  Contractures of lower extremities.  Right upper arm AV fistula in place   Pulses:   Pulses palpable in all extremities; symmetric all extremities   Skin:   Skin color normal, Skin is warm and dry,  no rashes or palpable lesions   Neurologic:  Residual neurological deficit with left-sided weakness            Data Review:    I reviewed the patient's new clinical results.  Results from last 7 days   Lab Units 12/14/21  0508 12/13/21  0804 12/12/21  0521 12/11/21  1159 12/10/21  0456 12/09/21  1646   WBC 10*3/mm3 15.33* 13.16* 8.73 7.99 6.55 6.10   HEMOGLOBIN g/dL 9.1* 9.9* 10.0* 9.1* 8.7* 10.3*   PLATELETS 10*3/mm3 316 291 303 296 265 336     Results from last 7 days   Lab Units 12/14/21  0508 12/13/21  0804 12/12/21  0521 12/11/21  1159 12/10/21  0456 12/09/21  1802   SODIUM mmol/L 134* 137 137 139 135* 137   POTASSIUM mmol/L 5.0 4.6 3.4* 3.5 4.2 5.0   CHLORIDE mmol/L 94* 97* 97* 97* 97* 97*   CO2 mmol/L 23.3 22.2 23.5 26.2 19.3* 24.9   BUN mg/dL 72* 61* 44* 32* 58* 50*   CREATININE mg/dL 5.91* 5.67* 4.55* 3.67* 6.12* 5.79*   CALCIUM mg/dL 9.6 9.7 9.7 9.5 9.3 9.4   GLUCOSE mg/dL 179* 200* 238* 156* 197* 165*     Microbiology Results (last 10 days)     Procedure Component Value - Date/Time    CANDIDA AURIS SCREEN - Swab, Axilla Right, Axilla Left and Groin [[id number]]  (Normal) Collected:  12/10/21 0928    Lab Status: Preliminary result Specimen: Swab from Axilla Right, Axilla Left and Groin Updated: 12/14/21 1700     Candida Auris Screen Culture No Candida auris isolated at 4 days    Blood Culture - Blood, Arm, Right [[id number]]  (Normal) Collected: 12/09/21 2032    Lab Status: Final result Specimen: Blood from Arm, Right Updated: 12/14/21 2045     Blood Culture No growth at 5 days    Blood Culture - Blood, Arm, Left [[id number]]  (Abnormal) Collected: 12/09/21 1818    Lab Status: Preliminary result Specimen: Blood from Arm, Left Updated: 12/14/21 0823     Blood Culture Abnormal Stain     Gram Stain Anaerobic Bottle Gram positive bacilli    Narrative:      Blood culture does not meet the specified criteria for PCR identification.  If pregnant, immunocompromised, or clinical concern for meningitis, call lab to run BCID for Listeria monocytogenes.    Respiratory Panel PCR w/COVID-19(SARS-CoV-2) CH[facility]ITO/JESSIE/HIMANSHU/PAD/COR/MAD/KEMAR In-House, NP Swab in UTM/VTM, 3-4 HR TAT - Swab, Nasopharynx [[id number]]  (Abnormal) Collected: 12/09/21 1804    Lab Status: Final result Specimen: Swab from Nasopharynx Updated: 12/09/21 1911     ADENOVIRUS, PCR Not Detected     Coronavirus 229E Not Detected     Coronavirus HKU1 Not Detected     Coronavirus NL63 Not Detected     Coronavirus OC43 Not Detected     COVID19 Detected     Human Metapneumovirus Not Detected     Human Rhinovirus/Enterovirus Not Detected     Influenza A PCR Not Detected     Influenza B PCR Not Detected     Parainfluenza Virus 1 Not Detected     Parainfluenza Virus 2 Not Detected     Parainfluenza Virus 3 Not Detected     Parainfluenza Virus 4 Not Detected     RSV, PCR Not Detected     Bordetella pertussis pcr Not Detected     Bordetella parapertussis PCR Not Detected     Chlamydophila pneumoniae PCR Not Detected     Mycoplasma pneumo by PCR Not Detected    Narrative:      In the setting of a positive respiratory panel with a viral infection PLUS a negative  procalcitonin without other underlying concern for bacterial infection, consider observing off antibiotics or discontinuation of antibiotics and continue supportive care. If the respiratory panel is positive for atypical bacterial infection (Bordetella pertussis, Chlamydophila pneumoniae, or Mycoplasma pneumoniae), consider antibiotic de-escalation to target atypical bacterial infection.            Assessment:    Pneumonia due to COVID-19 virus  1-systemic COVID-19 infection with  2-COVID-19 pneumonia with hypoxia further confounded by  3-possible volume overload in the setting of end-stage renal disease on hemodialysis  4-history of CVA  5-vascular dementia  6-diabetes mellitus  7-immobilization syndrome  8-gram-positive bacilli blood culture on day 5 of incubation likely contaminant during the process of collection does not require any further intervention and work-up        Recommendations/Discussions:  · Continue supportive care  · Management of end-stage renal disease for primary team and nephrology service  · Continuedexamethasone and remdesivir with close monitoring of her clinical course in terms of need for oxygen supplementation and sense of wellbeing.  Patient would benefit from aggressive volume management and dialysis as scheduled prevent volume overload.  Follow-up on her inflammatory markers.  · Her decline and increased oxygen requirement is concerning and would consult pharmacy to see if she qualifies and meets the criteria for baricitinib.    Jack Baird MD  12/14/2021  13:40  Much of this encounter note is an electronic transcription/translation of spoken language to printed text. The electronic translation of spoken language may permit erroneous, or at times, nonsensical words or phrases to be inadvertently transcribed; Although I have reviewed the note for such errors, some may still exist

## 2021-12-15 NOTE — PROGRESS NOTES
[facility] Progress Note      Patient Name: Carolina Bhat  : 1960  MRN: [id number]  Primary Care Physician:  Provider, No Known  Date of admission: 2021    Subjective     Interval History:   Has no new complaints today.  Had a swallow eval.  Denies any fever or chills.  No chest pain or shortness of breath.    Review of Systems:   As noted above    Objective     Vitals:   Temp:  [96.6 °F (35.9 °C)-97.6 °F (36.4 °C)] 97.2 °F (36.2 °C)  Heart Rate:  [] 95  Resp:  [18] 18  BP: (109-122)/(63-72) 119/71  Flow (L/min):  [2-4] 2    Intake/Output Summary (Last 24 hours) at 12/15/2021 1539  Last data filed at 12/15/2021 0820  Gross per 24 hour   Intake 600 ml   Output --   Net 600 ml       Physical Exam:      Constitutional: Awake, difficult to understand .  Responds to basic questions  HEENT: Sclera anicteric, no conjunctival injection  Neck: Supple, no thyromegaly, no lymphadenopathy, trachea at midline, no JVD  Respiratory: Bilateral basal crackles  Cardiovascular: RRR, no murmurs, no rubs or gallops, no carotid bruit  Gastrointestinal: Positive bowel sounds, abdomen is soft, nontender and nondistended  : No palpable bladder  Musculoskeletal: No edema, no clubbing or cyanosis  Psychiatric: Appropriate affect, cooperative  Neurologic: Left-sided weakness  Access: Right upper extremity AV fistula with good thrill    Scheduled Meds:     atorvastatin, 60 mg, Oral, Nightly  carvedilol, 12.5 mg, Oral, BID With Meals  dexamethasone, 6 mg, Intravenous, Q24H  [START ON 2021] famotidine, 20 mg, Oral, Daily  heparin (porcine), 5,000 Units, Subcutaneous, Q12H  insulin glargine, 10 Units, Subcutaneous, Nightly  insulin lispro, 0-7 Units, Subcutaneous, TID AC  levETIRAcetam, 500 mg, Oral, BID  sodium hypochlorite, , Topical, Q12H      IV Meds:        Results Reviewed:   I have personally reviewed the results from the time of this admission to 12/15/2021 15:39 EST      Results from last 7 days   Lab Units 12/15/21  1322 12/14/21  0508 12/13/21  0804   SODIUM mmol/L 137 134* 137   POTASSIUM mmol/L 3.8 5.0 4.6   CHLORIDE mmol/L 95* 94* 97*   CO2 mmol/L 25.5 23.3 22.2   BUN mg/dL 48* 72* 61*   CREATININE mg/dL 4.64* 5.91* 5.67*   CALCIUM mg/dL 9.7 9.6 9.7   BILIRUBIN mg/dL 0.3 0.3 0.3   ALK PHOS U/L 117 108 101   ALT (SGPT) U/L 15 19 21   AST (SGOT) U/L 15 18 25   GLUCOSE mg/dL 188* 179* 200*       Estimated Creatinine Clearance: 15.4 mL/min (A) (by C-G formula based on SCr of 4.64 mg/dL (H)).    Results from last 7 days   Lab Units 12/13/21  0804 12/11/21  1159   PHOSPHORUS mg/dL 2.6 2.6             Results from last 7 days   Lab Units 12/15/21  1322 12/14/21  0508 12/13/21  0804 12/12/21  0521 12/11/21  1159   WBC 10*3/mm3 15.50* 15.33* 13.16* 8.73 7.99   HEMOGLOBIN g/dL 9.9* 9.1* 9.9* 10.0* 9.1*   PLATELETS 10*3/mm3 269 316 291 303 296             Assessment / Plan     ASSESSMENT:  1.  End-stage renal disease on maintenance in center hemodialysis on a Monday Wednesday Friday schedule.    2.  Hypertension with end-stage renal disease: Blood pressure controlled.  3.  Acute hypoxemic respiratory failure secondary to COVID-19 pneumonia versus volume overload.  4.  Hyperphosphatemia on binders now with hypophosphatemia we will start binders  5.  COVID-19 pneumonia.  6.  History of CVA  7.Diabetes mellitus type 2 diet controlled now.  8.  Anemia: Likely secondary to end-stage renal disease.  Iron panel not in favor of iron deficiency anemia  9. Hypokalemia: we will replace        Plan:  Hemodialysis today  Surveillance labs    Thank you for involving us in the care of Carolina Bhat.  Please feel free to call with any questions.    Agustin Almanzar MD  12/15/21  15:39 EST    [facility]  [phone]      Much of this encounter note is an electronic transcription/translation of spoken language to printed text. The electronic translation of spoken  language may permit erroneous, or at times, nonsensical words or phrases to be inadvertently transcribed; Although I have reviewed the note for such errors, some may still exist.

## 2021-12-15 NOTE — PROGRESS NOTES
"DAILY PROGRESS NOTE  [facility], [facility]    12/15/2021    Patient Identification:  Name: Carolina Bhat  Age: 61 y.o.  Sex: female  :  1960  MRN: [id number]           Primary Care Physician: Provider, No Known    Subjective:    Interval History:    Patient is tachycardic, otherwise she has been doing okay.  She stated she is not in pain at this time.  She is on 2 L of oxygen her saturation is 94%.  She stated she is thirsty.  Not longer have SVT. Cardiology following  Patient is day 15 after becoming positive for COVID-19 infection.  No labs today yet    ROS:     No nausea, vomiting, diarrhea, constipation, chest pain    Objective:    Scheduled Meds:  atorvastatin, 60 mg, Oral, Nightly  carvedilol, 12.5 mg, Oral, BID With Meals  dexamethasone, 6 mg, Intravenous, Q24H  famotidine, 20 mg, Intravenous, Q12H  heparin (porcine), 5,000 Units, Subcutaneous, Q12H  insulin glargine, 10 Units, Subcutaneous, Nightly  insulin lispro, 0-7 Units, Subcutaneous, TID AC  levETIRAcetam, 500 mg, Oral, BID  sodium hypochlorite, , Topical, Q12H        Continuous Infusions:       PRN Meds:  •  acetaminophen  •  dextrose  •  dextrose  •  glucagon (human recombinant)  •  HYDROcodone-acetaminophen  •  ondansetron  •  [COMPLETED] Insert peripheral IV **AND** sodium chloride    Intake/Output:    Intake/Output Summary (Last 24 hours) at 12/15/2021 0951  Last data filed at 12/15/2021 0820  Gross per 24 hour   Intake 720 ml   Output --   Net 720 ml         Exam:    T MAX 24 hrs: Temp (24hrs), Av.1 °F (36.2 °C), Min:96.6 °F (35.9 °C), Max:97.6 °F (36.4 °C)    Vitals Ranges:   Temp:  [96.6 °F (35.9 °C)-97.6 °F (36.4 °C)] 96.6 °F (35.9 °C)  Heart Rate:  [] 111  Resp:  [16-18] 18  BP: (109-131)/(63-88) 122/72    /72 (BP Location: Left arm, Patient Position: Lying)   Pulse 111   Temp 96.6 °F (35.9 °C) (Oral)   Resp 18   Ht 177 cm (69.69\")   Wt 89.4 kg (197 lb)   SpO2 90%  "  BMI 28.52 kg/m²     General: Alert, oriented x1-2.  Cooperative.  No pain at this time.    Neck: Supple, symmetrical, trachea midline, no adenopathy;              thyroid:  no enlargement/tenderness/nodules;              no carotid bruit or JVD  Cardiovascular: Normal rate, regular rhythm and intact distal pulses.              Exam reveals no gallop and no friction rub. No murmur heard  Pulmonary: Clear to auscultation bilaterally, respirations unlabored.               No rhonchi, wheezing or rales.   Abdominal: Soft. Soft, nontender, bowel sounds active all four quadrants,     no masses, no hepatomegaly, no splenomegaly.   Extremities: Left hand contraction.  Left hemiparesis from previous stroke  Neurological: Patient is awake oriented x2.  No new focal sensorimotor deficit.    Skin: Wound in sacral area covered with dressings.        Data Review:    Results from last 7 days   Lab Units 12/14/21  0508 12/13/21  0804 12/12/21  0521   WBC 10*3/mm3 15.33* 13.16* 8.73   HEMOGLOBIN g/dL 9.1* 9.9* 10.0*   HEMATOCRIT % 27.3* 30.0* 30.5*   PLATELETS 10*3/mm3 316 291 303       Results from last 7 days   Lab Units 12/14/21  0508 12/13/21  0804 12/12/21  0521   SODIUM mmol/L 134* 137 137   POTASSIUM mmol/L 5.0 4.6 3.4*   CHLORIDE mmol/L 94* 97* 97*   CO2 mmol/L 23.3 22.2 23.5   BUN mg/dL 72* 61* 44*   CREATININE mg/dL 5.91* 5.67* 4.55*   CALCIUM mg/dL 9.6 9.7 9.7   BILIRUBIN mg/dL 0.3 0.3 0.4   ALK PHOS U/L 108 101 98   ALT (SGPT) U/L 19 21 20   AST (SGOT) U/L 18 25 33*   GLUCOSE mg/dL 179* 200* 238*           Results from last 7 days   Lab Units 12/09/21  1646   HEMOGLOBIN A1C % 5.70*       Lab Results   Lab Value Date/Time    TROPONINT 3.070 (C) 12/14/2021 0508    TROPONINT 1.840 (C) 12/12/2021 0521    TROPONINT 2.020 (C) 12/11/2021 1159    TROPONINT 1.210 (C) 12/10/2021 0456    TROPONINT 0.464 (C) 12/09/2021 1646    TROPONINT 0.037 (C) 02/17/2021 1435    TROPONINT 0.033 (C) 02/17/2021 1216    TROPONINT 0.045 (H)  01/22/2019 1255    TROPONINT 0.052 (H) 01/22/2019 0357    TROPONINT 0.051 (H) 01/21/2019 1108    TROPONINT 0.073 (H) 06/13/2016 0640    TROPONINT 0.02 09/30/2015 0258       Microbiology Results (last 10 days)     Procedure Component Value - Date/Time    CANDIDA AURIS SCREEN - Swab, Axilla Right, Axilla Left and Groin [[id number]]  (Normal) Collected: 12/10/21 0928    Lab Status: Preliminary result Specimen: Swab from Axilla Right, Axilla Left and Groin Updated: 12/14/21 1700     Candida Auris Screen Culture No Candida auris isolated at 4 days    Blood Culture - Blood, Arm, Right [[id number]]  (Normal) Collected: 12/09/21 2032    Lab Status: Final result Specimen: Blood from Arm, Right Updated: 12/14/21 2045     Blood Culture No growth at 5 days    Blood Culture - Blood, Arm, Left [[id number]]  (Abnormal) Collected: 12/09/21 1818    Lab Status: Preliminary result Specimen: Blood from Arm, Left Updated: 12/14/21 0823     Blood Culture Abnormal Stain     Gram Stain Anaerobic Bottle Gram positive bacilli    Narrative:      Blood culture does not meet the specified criteria for PCR identification.  If pregnant, immunocompromised, or clinical concern for meningitis, call lab to run BCID for Listeria monocytogenes.    Respiratory Panel PCR w/COVID-19(SARS-CoV-2) CHAYITO/JESSIE/HIMANSHU/PAD/COR/MAD/KEMAR In-House, NP Swab in UTM/VTM, 3-4 HR TAT - Swab, Nasopharynx [[id number]]  (Abnormal) Collected: 12/09/21 1804    Lab Status: Final result Specimen: Swab from Nasopharynx Updated: 12/09/21 1911     ADENOVIRUS, PCR Not Detected     Coronavirus 229E Not Detected     Coronavirus HKU1 Not Detected     Coronavirus NL63 Not Detected     Coronavirus OC43 Not Detected     COVID19 Detected     Human Metapneumovirus Not Detected     Human Rhinovirus/Enterovirus Not Detected     Influenza A PCR Not Detected     Influenza B PCR Not Detected     Parainfluenza Virus 1 Not Detected     Parainfluenza Virus 2 Not Detected     Parainfluenza Virus 3 Not  Detected     Parainfluenza Virus 4 Not Detected     RSV, PCR Not Detected     Bordetella pertussis pcr Not Detected     Bordetella parapertussis PCR Not Detected     Chlamydophila pneumoniae PCR Not Detected     Mycoplasma pneumo by PCR Not Detected    Narrative:      In the setting of a positive respiratory panel with a viral infection PLUS a negative procalcitonin without other underlying concern for bacterial infection, consider observing off antibiotics or discontinuation of antibiotics and continue supportive care. If the respiratory panel is positive for atypical bacterial infection (Bordetella pertussis, Chlamydophila pneumoniae, or Mycoplasma pneumoniae), consider antibiotic de-escalation to target atypical bacterial infection.           Imaging Results (Last 7 Days)     Procedure Component Value Units Date/Time    XR Chest 1 View [[id number]] Collected: 12/09/21 1728     Updated: 12/09/21 2107    Narrative:      EXAMINATION: SINGLE VIEW CHEST RADIOGRAPH     HISTORY: 60-year-old female with a history of Covid and shortness of  air.     FINDINGS: An AP portable chest radiograph was obtained. Comparison is  made to a chest radiograph dated 02/17/2021. There is hazy opacification  seen throughout both lungs with interstitial opacification in both  lungs, left worse than right. The cardiac silhouette is mildly enlarged.  The visualized osseous structures appear normal.       Impression:      There is evidence of bilateral interstitial and ground-glass  infiltrates consistent with bilateral pneumonia and/or pulmonary edema.  Mild cardiomegaly is noted.     This report was finalized on 12/9/2021 9:04 PM by Dr. Nacho Chong M.D.               Assessment:        Pneumonia due to COVID-19 virus  Acute respiratory failure with hypoxia  Volume overload  End-stage renal disease on dialysis  Elevated troponin.  Diabetes mellitus 2  Anemia, chronic kidney disease  Seizure disorder  Coronary artery  disease  Hypertension  Hypokalemia        Plan:    Continue to wean down oxygen, saturation is 94% on 2 L.  Continue supportive therapy for COVID-19 pneumonia.  In addition to remdesivir as well as dexamethasone.  ID following.  Leukocytosis probably from dexamethasone.  Hemodialysis per renal  Elevated troponin, cardiology following.  No acute cardiovascular event at this time.  Adjust insulin as needed, blood sugar okay  Continue DVT/stress ulcer prophylaxis  Probably back to nursing home tomorrow if okay with all.  Labs in am      Rhys Mary MD  12/15/2021           I wore protective equipment throughout this patient encounter including a face mask, gloves, and protective eyewear.  Hand hygiene was performed before donning protective equipment and after removal when leaving the room.

## 2021-12-16 PROBLEM — D89.832 CYTOKINE RELEASE SYNDROME, GRADE 2: Status: ACTIVE | Noted: 2021-01-01

## 2021-12-16 NOTE — NURSING NOTE
1938 Patient complained of chest pain. Notified by monitor tech of patient's heart rate at 140s.    1939 RN, nurse aide at bedside. Vitals signs stable with RR 22. Patient awake complains of chest pain.     1942 STAT EKG ordered    1950 EKG tech at bedside    1956 Cardiology group paged.    2007 Received call back and orders from Cardiology NP    2011 Called Nephrology answering service. Spoke to Yue @ 2015. Sent a page to on call doctor. Awaiting call back from oncall doctor.     2023 Called dialysis to clarify patient's dialysis schedule. Awaiting on callback.    2039 Received a call from dialysis unit and RN was notified that patient is scheduled to be dialyzed tomorrow. Dialysis office made aware of patient's status. Still waiting for Nephrology call back.     2046 Second page sent out to Nephrology group through answering service and spoke to Ivan. 2.5mg IV Metoprolol given @ 2032. HR still at 151 bpm.    2118 Third page called in to Nephrology answering service. Still awaiting call back.    2122 Received a call from Dr. Almanzar. Notified of patient's HR and orders received from cardiology. Order received from nephrologist to call dialysis unit for a STAT dialysis run.    2123 Called [facility]. Spoke to Ivan. Per Ivan she will call on call dialysis nurse.    2129 Page sent to cardiology group to notify of sustained heart rate of 150s. Spoke to Yue.    2132 Spoke to Cardiology NP.     2133 Spoke to Susi dialysis RN, notified of STAT dialysis ordered by Dr. Almanzar. Dialysis RN stated she is here in the hospital and will be able to get to patient around 2300-000.    2144 Received a call from Cardiology to draw Mg, K levels and ordered to give another 2.5mg IV Metoprolol once. /83 . Cardiology NP made aware of conversation with dialysis nurse.     2224 Heart rate now down to 102bpm

## 2021-12-16 NOTE — PLAN OF CARE
Goal Outcome Evaluation:  Plan of Care Reviewed With: patient        Progress: no change  Outcome Summary: A/O x1-2, disoriented to time and situation. Dialyzed for 3.5hrs with 2L off per dialysis nurse. Wound dressing changed.

## 2021-12-16 NOTE — PLAN OF CARE
Goal Outcome Evaluation:  Plan of Care Reviewed With: patient           Patient alert to self only. Patient has been cooperative with care. Wound care as per order. Patient tolerated well. Pain medication administered as per patient request. Effectiveness verbalized. No distress noted. She continues on room air with no SOA noted. Patient has a poor appetite. He is fed by staff. Falls, aspiration, limb restriction, seizure and isolation maintained. Call light in reach. She continues on specialty bed and has bilateral heel boot protectors on. Turn and repositioned frequently by staff. Bed alarm on.

## 2021-12-16 NOTE — NURSING NOTE
0410  HD WITHOUT INCIDENT OR C/O. TOLERATED FAIR. ALBUMIN X 2 DOSES (12.5g) ADMINISTERED FOR BP SUPPORT. REMOVED 2 L AS LINDA.  AVF NEEDLES DEACCESSED. HEMOSTASIS ACHIEVED. STABLE POST COMPLETION OF TX.

## 2021-12-16 NOTE — PROGRESS NOTES
"  Infectious Diseases Progress Note    Jack Baird MD     [facility]  Patient Identification:  Name: Carolina Bhat  Age: 61 y.o.  Sex: female  :  1960  MRN: [id number]         Primary Care Physician: Provider, No Known      Delayed charting.      Subjective: Confused and moves around.  Interval History: See consultation note  Oxygen requirement seems to have stabilized.  Objective:    Scheduled Meds:atorvastatin, 60 mg, Oral, Nightly  carvedilol, 12.5 mg, Oral, BID With Meals  dexamethasone, 6 mg, Intravenous, Q24H  famotidine, 20 mg, Oral, Daily  heparin (porcine), 5,000 Units, Subcutaneous, Q12H  insulin glargine, 10 Units, Subcutaneous, Nightly  insulin lispro, 0-7 Units, Subcutaneous, TID AC  levETIRAcetam, 500 mg, Oral, BID  sodium hypochlorite, , Topical, Q12H      Continuous Infusions:   Intake/Output:     Intake/Output Summary (Last 24 hours) at 12/15/2021 0951  Last data filed at 12/15/2021 0820      Gross per 24 hour   Intake 720 ml   Output --   Net 720 ml            Exam:     T MAX 24 hrs: Temp (24hrs), Av.1 °F (36.2 °C), Min:96.6 °F (35.9 °C), Max:97.6 °F (36.4 °C)     Vitals Ranges:   Temp:  [96.6 °F (35.9 °C)-97.6 °F (36.4 °C)] 96.6 °F (35.9 °C)  Heart Rate:  [] 111  Resp:  [16-18] 18  BP: (109-131)/(63-88) 122/72     /72 (BP Location: Left arm, Patient Position: Lying)   Pulse 111   Temp 96.6 °F (35.9 °C) (Oral)   Resp 18   Ht 177 cm (69.69\")   Wt 89.4 kg (197 lb)   SpO2 90%   BMI 28.52 kg/m²     Patient is examined using the personal protective equipment as per guidelines from infection control for this particular patient as enacted.  Hand washing was performed before and after patient interaction.  General Appearance:   Chronically ill-appearing female who does not appear to be in any acute distress, confused does not interact.   Head:    Normocephalic, without obvious abnormality, atraumatic   Eyes:    PERRL, conjunctivae/corneas clear, EOM's " intact, both eyes   Ears:    Normal external ear canals, both ears   Nose:   Nares normal, septum midline, mucosa normal, no drainage    or sinus tenderness   Throat:   Lips, tongue, gums normal; oral mucosa pink and moist   Neck:   Supple, symmetrical, trachea midline, no adenopathy;     thyroid:  no enlargement/tenderness/nodules; no carotid    bruit or JVD   Back:     Symmetric, no curvature, ROM normal, no CVA tenderness   Lungs:     Clear to auscultation bilaterally, respirations unlabored   Chest Wall:    No tenderness or deformity    Heart:    Regular rate and rhythm, S1 and S2 normal, no murmur, rub   or gallop   Abdomen:     Soft, non-tender, bowel sounds active all four quadrants,     no masses, no hepatomegaly, no splenomegaly   Extremities:  Contractures of lower extremities.  Right upper arm AV fistula in place   Pulses:   Pulses palpable in all extremities; symmetric all extremities   Skin:   Skin color normal, Skin is warm and dry,  no rashes or palpable lesions   Neurologic:  Residual neurological deficit with left-sided weakness            Data Review:    I reviewed the patient's new clinical results.  Results from last 7 days   Lab Units 12/14/21  0508 12/13/21  0804 12/12/21  0521 12/11/21  1159 12/10/21  0456 12/09/21  1646   WBC 10*3/mm3 15.33* 13.16* 8.73 7.99 6.55 6.10   HEMOGLOBIN g/dL 9.1* 9.9* 10.0* 9.1* 8.7* 10.3*   PLATELETS 10*3/mm3 316 291 303 296 265 336     Results from last 7 days   Lab Units 12/14/21  0508 12/13/21  0804 12/12/21  0521 12/11/21  1159 12/10/21  0456 12/09/21  1802 12/09/21  1802   SODIUM mmol/L 134* 137 137 139 135*  --  137   POTASSIUM mmol/L 5.0 4.6 3.4* 3.5 4.2   < > 5.0   CHLORIDE mmol/L 94* 97* 97* 97* 97*  --  97*   CO2 mmol/L 23.3 22.2 23.5 26.2 19.3*  --  24.9   BUN mg/dL 72* 61* 44* 32* 58*  --  50*   CREATININE mg/dL 5.91* 5.67* 4.55* 3.67* 6.12*  --  5.79*   CALCIUM mg/dL 9.6 9.7 9.7 9.5 9.3  --  9.4   GLUCOSE mg/dL 179* 200* 238* 156* 197*  --  165*    <  > = values in this interval not displayed.     Microbiology Results (last 10 days)     Procedure Component Value - Date/Time    CANDIDA AURIS SCREEN - Swab, Axilla Right, Axilla Left and Groin [[id number]]  (Normal) Collected: 12/10/21 0928    Lab Status: Final result Specimen: Swab from Axilla Right, Axilla Left and Groin Updated: 12/15/21 1700     Anabell Auris Screen Culture No Candida auris isolated at 5 days    Blood Culture - Blood, Arm, Right [[id number]]  (Normal) Collected: 12/09/21 2032    Lab Status: Final result Specimen: Blood from Arm, Right Updated: 12/14/21 2045     Blood Culture No growth at 5 days    Blood Culture - Blood, Arm, Left [[id number]]  (Abnormal) Collected: 12/09/21 1818    Lab Status: Preliminary result Specimen: Blood from Arm, Left Updated: 12/15/21 1414     Blood Culture Culture in progress     Gram Stain Anaerobic Bottle Gram positive bacilli    Narrative:      Blood culture does not meet the specified criteria for PCR identification.  If pregnant, immunocompromised, or clinical concern for meningitis, call lab to run BCID for Listeria monocytogenes.    Respiratory Panel PCR w/COVID-19(SARS-CoV-2) CHAYITO/JESSIE/HIMANSHU/PAD/COR/MAD/KEMAR In-House, NP Swab in UTM/VTM, 3-4 HR TAT - Swab, Nasopharynx [[id number]]  (Abnormal) Collected: 12/09/21 1804    Lab Status: Final result Specimen: Swab from Nasopharynx Updated: 12/09/21 1911     ADENOVIRUS, PCR Not Detected     Coronavirus 229E Not Detected     Coronavirus HKU1 Not Detected     Coronavirus NL63 Not Detected     Coronavirus OC43 Not Detected     COVID19 Detected     Human Metapneumovirus Not Detected     Human Rhinovirus/Enterovirus Not Detected     Influenza A PCR Not Detected     Influenza B PCR Not Detected     Parainfluenza Virus 1 Not Detected     Parainfluenza Virus 2 Not Detected     Parainfluenza Virus 3 Not Detected     Parainfluenza Virus 4 Not Detected     RSV, PCR Not Detected     Bordetella pertussis pcr Not Detected     Bordetella  parapertussis PCR Not Detected     Chlamydophila pneumoniae PCR Not Detected     Mycoplasma pneumo by PCR Not Detected    Narrative:      In the setting of a positive respiratory panel with a viral infection PLUS a negative procalcitonin without other underlying concern for bacterial infection, consider observing off antibiotics or discontinuation of antibiotics and continue supportive care. If the respiratory panel is positive for atypical bacterial infection (Bordetella pertussis, Chlamydophila pneumoniae, or Mycoplasma pneumoniae), consider antibiotic de-escalation to target atypical bacterial infection.            Assessment:    Pneumonia due to COVID-19 virus  1-systemic COVID-19 infection with  2-COVID-19 pneumonia with hypoxia further confounded by  3-possible volume overload in the setting of end-stage renal disease on hemodialysis  4-history of CVA  5-vascular dementia  6-diabetes mellitus  7-immobilization syndrome  8-gram-positive bacilli blood culture on day 5 of incubation likely contaminant during the process of collection does not require any further intervention and work-up        Recommendations/Discussions:  · Continue supportive care  · Management of end-stage renal disease for primary team and nephrology service  · Continuedexamethasone and remdesivir with close monitoring of her clinical course in terms of need for oxygen supplementation and sense of wellbeing.  Patient would benefit from aggressive volume management and dialysis as scheduled prevent volume overload.  Follow-up on her inflammatory markers.  · Her decline and increased oxygen requirement is concerning and would consult pharmacy to see if she qualifies and meets the criteria for baricitinib.    Jack Baird MD    Much of this encounter note is an electronic transcription/translation of spoken language to printed text. The electronic translation of spoken language may permit erroneous, or at times, nonsensical words or phrases to  be inadvertently transcribed; Although I have reviewed the note for such errors, some may still exist

## 2021-12-16 NOTE — PROGRESS NOTES
[facility] Progress Note      Patient Name: Carolina Bhat  : 1960  MRN: [id number]  Primary Care Physician:  Provider, No Known  Date of admission: 2021    Subjective     Interval History:   Has no new complaints today.  Denies any fever or chills.  No chest pain or shortness of breath.  Disoriented to time and situation.  Had dialysis for 3-1/2 hours with 2 L of per dialysis nurse    Review of Systems:   As noted above    Objective     Vitals:   Temp:  [97 °F (36.1 °C)-99 °F (37.2 °C)] 97 °F (36.1 °C)  Heart Rate:  [] 100  Resp:  [18-22] 18  BP: (106-136)/(61-83) 106/61  Flow (L/min):  [2] 2    Intake/Output Summary (Last 24 hours) at 2021 0734  Last data filed at 2021 0410  Gross per 24 hour   Intake 220 ml   Output 2000 ml   Net -1780 ml       Physical Exam:      Constitutional: Awake, difficult to understand .   HEENT: Sclera anicteric, no conjunctival injection  Neck: Supple, no thyromegaly, no lymphadenopathy, trachea at midline, no JVD  Respiratory: Bilateral basal crackles  Cardiovascular: RRR, no murmurs, no rubs or gallops, no carotid bruit  Gastrointestinal: Positive bowel sounds, abdomen is soft, nontender and nondistended  : No palpable bladder  Musculoskeletal: No edema, no clubbing or cyanosis  Psychiatric: Appropriate affect, cooperative  Neurologic: Left-sided weakness  Access: Right upper extremity AV fistula with good thrill    Scheduled Meds:     atorvastatin, 60 mg, Oral, Nightly  carvedilol, 12.5 mg, Oral, BID With Meals  dexamethasone, 6 mg, Intravenous, Q24H  famotidine, 20 mg, Oral, Daily  heparin (porcine), 5,000 Units, Subcutaneous, Q12H  insulin glargine, 10 Units, Subcutaneous, Nightly  insulin lispro, 0-7 Units, Subcutaneous, TID AC  levETIRAcetam, 500 mg, Oral, BID  sodium hypochlorite, , Topical, Q12H      IV Meds:        Results Reviewed:   I have personally reviewed the results from the time of this admission to  12/16/2021 07:34 EST     Results from last 7 days   Lab Units 12/15/21  2222 12/15/21  1322 12/14/21  0508 12/13/21  0804 12/13/21  0804   SODIUM mmol/L  --  137 134*  --  137   POTASSIUM mmol/L 4.4 3.8 5.0   < > 4.6   CHLORIDE mmol/L  --  95* 94*  --  97*   CO2 mmol/L  --  25.5 23.3  --  22.2   BUN mg/dL  --  48* 72*  --  61*   CREATININE mg/dL  --  4.64* 5.91*  --  5.67*   CALCIUM mg/dL  --  9.7 9.6  --  9.7   BILIRUBIN mg/dL  --  0.3 0.3  --  0.3   ALK PHOS U/L  --  117 108  --  101   ALT (SGPT) U/L  --  15 19  --  21   AST (SGOT) U/L  --  15 18  --  25   GLUCOSE mg/dL  --  188* 179*  --  200*    < > = values in this interval not displayed.       Estimated Creatinine Clearance: 15.4 mL/min (A) (by C-G formula based on SCr of 4.64 mg/dL (H)).    Results from last 7 days   Lab Units 12/15/21  2222 12/13/21  0804 12/11/21  1159   MAGNESIUM mg/dL 2.2  --   --    PHOSPHORUS mg/dL  --  2.6 2.6             Results from last 7 days   Lab Units 12/15/21  1322 12/14/21  0508 12/13/21  0804 12/12/21  0521 12/11/21  1159   WBC 10*3/mm3 15.50* 15.33* 13.16* 8.73 7.99   HEMOGLOBIN g/dL 9.9* 9.1* 9.9* 10.0* 9.1*   PLATELETS 10*3/mm3 269 316 291 303 296             Assessment / Plan     ASSESSMENT:  1.  End-stage renal disease on maintenance in center hemodialysis on a Monday Wednesday Friday schedule.    2.  Hypertension with end-stage renal disease: Blood pressure controlled.  3.  Acute hypoxemic respiratory failure secondary to COVID-19 pneumonia versus volume overload.  4.  Hyperphosphatemia on binders now with hypophosphatemia we will start binders  5.  COVID-19 pneumonia.  6.  History of CVA  7.Diabetes mellitus type 2 diet controlled now.  8.  Anemia: Likely secondary to end-stage renal disease.  Iron panel not in favor of iron deficiency anemia  9. Hypokalemia: we will replace        Plan:  Hemodialysis at AM  Surveillance labs    Thank you for involving us in the care of Carolina Bhat.  Please feel free to call  with any questions.    Agustin Almanzar MD  12/16/21  07:34 [facility]    [facility]  [phone]      Much of this encounter note is an electronic transcription/translation of spoken language to printed text. The electronic translation of spoken language may permit erroneous, or at times, nonsensical words or phrases to be inadvertently transcribed; Although I have reviewed the note for such errors, some may still exist.

## 2021-12-16 NOTE — NURSING NOTE
Infection control called and notified nurse that the patient is a severe case of Covid and that she can not currently come out of isolation. She stated that at 20 days quarantine, she could be reviewed by Dr. Whitaker who would then decide whether to take the patient out of isolation or not.

## 2021-12-16 NOTE — NURSING NOTE
Infection control notified regarding patient being 16 days post Covid testing and whether her isolation can be removed. Infection control to review and notify nurse at a later time.

## 2021-12-17 NOTE — PLAN OF CARE
"Goal Outcome Evaluation:  Plan of Care Reviewed With: patient        Progress: no change  Outcome Summary: Alert, disoriented to time and situation. Repeatedly stated \"I don't want to go to dialysis\" last night. Reassured patient that she was not scheduled to be dialyzed last night. SR, No arrythmias overnight. O2 saturations maintained on room air. PRN norco given.  "

## 2021-12-17 NOTE — CONSULTS
Cc: Necrotic decubitus ulcer    History of presenting illness:   This is an unfortunate 61-year-old female with a history of stroke and severe left-sided hemiplegia who is essentially bedbound was brought from the nursing home with recent Covid infection and has been here now for over a week.  She seems to be doing reasonably well from a pulmonary standpoint, but she has a chronic decubitus ulcer which has worsened.  She was evaluated by wound care today and referred for possible debridement.  There is foul-smelling odor.    Past Medical History: Stroke, Covid pneumonia, vascular dementia, diabetes, immobilization syndrome, chronic renal failure on hemodialysis    Past Surgical History: Significant for right-sided brachiocephalic shunt    Medications: Reviewed and reconciled in epic.  Noted to be on heparin injections.  She also takes aspirin.    Allergies: Darvon    Social History: Former smoker, lives with near total assistance    Family History: Negative for colorectal cancer    Review of Systems:   No meaningful review of systems able to be obtained due to stroke and dementia.    Physical Exam:  BMI: 29.1  Temperature 98.6 heart rate 93 blood pressure 125/78  General: Awake and alert, seems to nod her head appropriately sometimes but not able to answer questions meaningfully  Eyes: No scleral icterus, extraocular movements are intact  Neck: Supple without lymphadenopathy or thyromegaly, trachea is in the midline  Respiratory: There is good bilateral chest expansion, no use of accessory muscles is noted  Cardiovascular: No jugular venous distention or peripheral edema is seen  Gastrointestinal: Soft and benign, there is no mass or hernia felt  Skin: Over the sacrum there is a large area of denudement of the skin.  There is an area of necrotic debris and an unstageable decubitus ulcer.  There is a foul-smelling odor.  This extends over to the patient's left side.    Laboratory data: White blood cell count 12.9  hemoglobin 9.5 chemistries show a glucose of 381 creatinine of 3.7 sodium 138.    Imaging data: No recent relevant data      Assessment and plan:   -Necrotic sacral decubitus ulcer  -Multiple other medical issues including immobility, stroke, dementia, chronic renal failure and poorly controlled diabetes  -Options discussed.  Patient is going to require debridement of the sacral decubitus ulcer.  I have tentatively scheduled her for debridement tomorrow.  We will hold heparin.  She will require dressing changes.  Unfortunately she is unlikely ever to heal this.      Jeevan Balderrama MD, FACS  General, Minimally Invasive and Endoscopic Surgery  [facility]    [address], [address]  [place], [place], [address]  P: [phone]  F: [phone]

## 2021-12-17 NOTE — PROGRESS NOTES
[facility] Progress Note      Patient Name: Carolina Bhat  : 1960  MRN: [id number]  Primary Care Physician:  Provider, No Known  Date of admission: 2021    Subjective     Interval History:   Has no new complaints today.  Denies any fever or chills.  No chest pain or shortness of breath.  Disoriented to time and situation.  Complaining of back pain    Review of Systems:   As noted above    Objective     Vitals:   Temp:  [96.1 °F (35.6 °C)-98.1 °F (36.7 °C)] 96.1 °F (35.6 °C)  Heart Rate:  [74-88] 87  Resp:  [18-20] 20  BP: (101-151)/(56-83) 151/83    Intake/Output Summary (Last 24 hours) at 2021 0913  Last data filed at 2021 1400  Gross per 24 hour   Intake 120 ml   Output --   Net 120 ml       Physical Exam:      Constitutional: Awake, difficult to understand .   HEENT: Sclera anicteric, no conjunctival injection  Neck: Supple, no thyromegaly, no lymphadenopathy, trachea at midline, no JVD  Respiratory: Bilateral basal crackles  Cardiovascular: RRR, no murmurs, no rubs or gallops, no carotid bruit  Gastrointestinal: Positive bowel sounds, abdomen is soft, nontender and nondistended  : No palpable bladder  Musculoskeletal: No edema, no clubbing or cyanosis  Psychiatric: Appropriate affect, cooperative  Neurologic: Left-sided weakness  Access: Right upper extremity AV fistula with good thrill    Scheduled Meds:     aspirin, 81 mg, Oral, Daily  atorvastatin, 60 mg, Oral, Nightly  carvedilol, 12.5 mg, Oral, BID With Meals  dexamethasone, 6 mg, Oral, Daily With Breakfast  famotidine, 20 mg, Oral, Daily  heparin (porcine), 5,000 Units, Subcutaneous, Q12H  insulin glargine, 10 Units, Subcutaneous, Nightly  insulin lispro, 0-7 Units, Subcutaneous, TID AC  levETIRAcetam, 500 mg, Oral, BID  sodium hypochlorite, , Topical, Q12H      IV Meds:        Results Reviewed:   I have personally reviewed the results from the time of this admission to 2021 09:13 EST      Results from last 7 days   Lab Units 12/17/21  0513 12/16/21  2307 12/15/21  2222 12/15/21  1322 12/15/21  1322 12/14/21  0508 12/14/21  0508   SODIUM mmol/L 138 137  --   --  137   < > 134*   POTASSIUM mmol/L 4.4 4.4 4.4   < > 3.8   < > 5.0   CHLORIDE mmol/L 95* 95*  --   --  95*   < > 94*   CO2 mmol/L 26.3 25.7  --   --  25.5   < > 23.3   BUN mg/dL 43* 41*  --   --  48*   < > 72*   CREATININE mg/dL 3.70* 3.36*  --   --  4.64*   < > 5.91*   CALCIUM mg/dL 9.8 9.7  --   --  9.7   < > 9.6   BILIRUBIN mg/dL 0.3  --   --   --  0.3  --  0.3   ALK PHOS U/L 119*  --   --   --  117  --  108   ALT (SGPT) U/L 13  --   --   --  15  --  19   AST (SGOT) U/L 14  --   --   --  15  --  18   GLUCOSE mg/dL 208* 243*  --   --  188*   < > 179*    < > = values in this interval not displayed.       Estimated Creatinine Clearance: 19.3 mL/min (A) (by C-G formula based on SCr of 3.7 mg/dL (H)).    Results from last 7 days   Lab Units 12/15/21  2222 12/13/21  0804 12/11/21  1159   MAGNESIUM mg/dL 2.2  --   --    PHOSPHORUS mg/dL  --  2.6 2.6             Results from last 7 days   Lab Units 12/17/21  0513 12/15/21  1322 12/14/21  0508 12/13/21  0804 12/12/21  0521   WBC 10*3/mm3 12.97* 15.50* 15.33* 13.16* 8.73   HEMOGLOBIN g/dL 9.5* 9.9* 9.1* 9.9* 10.0*   PLATELETS 10*3/mm3 297 269 316 291 303             Assessment / Plan     ASSESSMENT:  1.  End-stage renal disease on maintenance in center hemodialysis on a Monday Wednesday Friday schedule.    2.  Hypertension with end-stage renal disease: Blood pressure controlled.  3.  Acute hypoxemic respiratory failure secondary to COVID-19 pneumonia versus volume overload.  4.  Hyperphosphatemia on binders now with hypophosphatemia we will start binders  5.  COVID-19 pneumonia.  6.  History of CVA  7.Diabetes mellitus type 2 diet controlled now.  8.  Anemia: Likely secondary to end-stage renal disease.  Iron panel not in favor of iron deficiency anemia  9. Hypokalemia: we will replace         Plan:  Hemodialysis today  Surveillance labs    Thank you for involving us in the care of Carolina Bhat.  Please feel free to call with any questions.    Agustin Almanzar MD  12/17/21  09:13 [facility] [place]    [facility]  [phone]      Much of this encounter note is an electronic transcription/translation of spoken language to printed text. The electronic translation of spoken language may permit erroneous, or at times, nonsensical words or phrases to be inadvertently transcribed; Although I have reviewed the note for such errors, some may still exist.

## 2021-12-17 NOTE — CASE MANAGEMENT/SOCIAL WORK
Continued Stay Note  [facility]     Patient Name: Carolina Bhat  MRN: [id number]  Today's Date: 12/17/2021    Admit Date: 12/9/2021     Discharge Plan     Row Name 12/17/21 1402       Plan    Plan Return back to [facility] via EMS once medically ready    Patient/Family in Agreement with Plan yes    Plan Comments CCP spoke with dtr/Guardian Ida [phone] via outbound call introduced self and explained CCP role. Possible dc over the weekend if ready. Discussed transportation and she states via ems. CCP explained hospital unable to guarantee insurance coverage for EMS use. She verbalized understanding. Packet in [employer]. Note left for MD regarding dispo date. Mindi GONZALES/CCP               Discharge Codes    No documentation.               Expected Discharge Date and Time     Expected Discharge Date Expected Discharge Time    Dec 20, 2021             Britany Monteiro, RN

## 2021-12-17 NOTE — NURSING NOTE
12/17/21 1520   Wound 12/09/21 2116 lower coccyx Pressure Injury   Placement Date/Time: 12/09/21 2116   Present on Hospital Admission: Yes  Orientation: lower  Location: coccyx  Primary Wound Type: Pressure Injury  Stage, Pressure Injury : Stage 2   Dressing Appearance intact   Base yellow; slough; exposed structure  (coccyx bone prominent and easily palpable under layer of slough)   Wound Length (cm)   (L sacral 6x8x1 (likely deeper) slough present but loose)   Wound Width (cm)   (R sacral more superficial)   Drainage Characteristics/Odor malodorous; creamy   Drainage Amount small   Dressing Care dressing reinforced  (new dakins gauze applied to wound, covered with sacral optifoam)     Wound/ostomy - reevaluate sacrococcygeal unstageable pressure injury. Wound has worsened and now present with a depth of at least 1 cm, likely deeper under layer of loose,slough. Odor remains. Prominent coccyx bone palpated under the layer of slough, once slough debrides away bone will likely be exposed. Patient would benefit from surgical consult for debridement.     We will continue dakins dressing changes 2 times daily. Patient is on a low air loss mattress and foam wedge used for positioning. Spoke to patient's nurse, Jaylene, and discussed need for surgical consult.

## 2021-12-17 NOTE — PROGRESS NOTES
"[facility]    Patient Name: Carolina Bhat  :1960  61 y.o.  LOS: 8  Encounter Provider: Marty Sorenson Jr, MD      Patient Care Team:  Provider, No Known as PCP - General    Chief Complaint: Follow-up PSVT, COVID-19 pneumonia, NSTEMI, end-stage renal disease, elevated D-dimer    Interval History: Patient had no SVT overnight. Doing well on current dose of beta-blocker. No angina. Elevated D-dimer noted from yesterday afternoon.       Objective   Vital Signs  Temp:  [96.1 °F (35.6 °C)-98.1 °F (36.7 °C)] 96.1 °F (35.6 °C)  Heart Rate:  [74-88] 87  Resp:  [18-20] 20  BP: (101-151)/(56-83) 151/83    Intake/Output Summary (Last 24 hours) at 2021 1142  Last data filed at 2021 1400  Gross per 24 hour   Intake 120 ml   Output --   Net 120 ml     Flowsheet Rows      First Filed Value   Admission Height 177 cm (69.69\") Documented at 2021 2320   Admission Weight 85.3 kg (188 lb) Documented at 2021 2320            Physical Exam  Case discussed with RN staff in detail. Digital records reviewed. Physical exam deferred secondary to COVID-19 infection.    Pertinent Test Results:  Results from last 7 days   Lab Units 21  0513 21  2307 12/15/21  2222 12/15/21  1322 21  0508 21  0804 21  0521 21  1159 21  1159   SODIUM mmol/L 138 137  --  137 134* 137 137  --  139   POTASSIUM mmol/L 4.4 4.4 4.4 3.8 5.0 4.6 3.4*   < > 3.5   CHLORIDE mmol/L 95* 95*  --  95* 94* 97* 97*  --  97*   CO2 mmol/L 26.3 25.7  --  25.5 23.3 22.2 23.5  --  26.2   BUN mg/dL 43* 41*  --  48* 72* 61* 44*  --  32*   CREATININE mg/dL 3.70* 3.36*  --  4.64* 5.91* 5.67* 4.55*  --  3.67*   GLUCOSE mg/dL 208* 243*  --  188* 179* 200* 238*  --  156*   CALCIUM mg/dL 9.8 9.7  --  9.7 9.6 9.7 9.7  --  9.5   AST (SGOT) U/L 14  --   --  15 18 25 33*  --  56*   ALT (SGPT) U/L 13  --   --  15 19 21 20  --  26    < > = values in this interval not displayed.     Results from last  " days   Lab Units 12/16/21  2307 12/15/21  2222 12/15/21  1322 12/14/21  0508 12/12/21  0521 12/11/21  1159   TROPONIN T ng/mL 3.240* 3.510* 3.810* 3.070* 1.840* 2.020*     Results from last 7 days   Lab Units 12/17/21  0513 12/15/21  1322 12/14/21  0508 12/13/21  0804 12/12/21  0521 12/11/21  1159   WBC 10*3/mm3 12.97* 15.50* 15.33* 13.16* 8.73 7.99   HEMOGLOBIN g/dL 9.5* 9.9* 9.1* 9.9* 10.0* 9.1*   HEMATOCRIT % 27.9* 29.0* 27.3* 30.0* 30.5* 26.4*   PLATELETS 10*3/mm3 297 269 316 291 303 296         Results from last 7 days   Lab Units 12/15/21  2222   MAGNESIUM mg/dL 2.2           Invalid input(s): LDLCALC                Medication Review:   aspirin, 81 mg, Oral, Daily  atorvastatin, 60 mg, Oral, Nightly  carvedilol, 12.5 mg, Oral, BID With Meals  dexamethasone, 6 mg, Oral, Daily With Breakfast  famotidine, 20 mg, Oral, Daily  heparin (porcine), 5,000 Units, Subcutaneous, Q12H  insulin glargine, 10 Units, Subcutaneous, Nightly  insulin lispro, 0-7 Units, Subcutaneous, TID AC  levETIRAcetam, 500 mg, Oral, BID  sodium hypochlorite, , Topical, Q12H              Assessment/Plan   1. PSVT -no SVT overnight..  Continue Coreg.  Blood pressure is elevated this morning but has been soft over the past few days. Will avoid up titration of beta-blocker at this time.  As needed IV beta-blocker is okay to use.  Patient has decrease in troponin level since admission.  Dr. Roach is planning for ischemic work-up once pulmonary infection is optimized.  2. COVID-19 pneumonia  3. ESRD on hemodialysis  4. Diabetes  5. NSTEMI -continue aspirin, beta-blocker and statin.    Cardiovascular issues are stable at this time. D-dimer checked yesterday which is significantly elevated. In the setting of COVID-19 infection there is increased risk for thromboembolic events. Will discuss consideration for CTA chest with Dr. Mary. We will see the patient as needed over the weekend. Dr. Roach will be back on Monday.      Marty Sorenson  MD [place]  [facility]  12/17/21  11:42 EST

## 2021-12-17 NOTE — PROGRESS NOTES
DAILY PROGRESS NOTE  [facility], [facility]    2021    Patient Identification:  Name: Carolina Bhat  Age: 61 y.o.  Sex: female  :  1960  MRN: [id number]           Primary Care Physician: Provider, No Known    Subjective:    Interval History:    Patient developed SVT last night.  Blood pressure on the low side.  Patient to have a 2D echo as well as continue work-up so that she is out of isolation.  Have hemodialysis  and Friday.  Patient is on room air, saturation is 90-92%.  Patient is day 16 after becoming positive for COVID-19 infection.  However with non-ST elevation MI, on medical treatment.  Blood sugar up-and-down.  No labs today, patient refusing labs.    ROS:     No nausea, vomiting, diarrhea, constipation, chest pain    Objective:    Scheduled Meds:  aspirin, 81 mg, Oral, Daily  atorvastatin, 60 mg, Oral, Nightly  carvedilol, 12.5 mg, Oral, BID With Meals  dexamethasone, 6 mg, Oral, Daily With Breakfast  famotidine, 20 mg, Oral, Daily  heparin (porcine), 5,000 Units, Subcutaneous, Q12H  insulin glargine, 10 Units, Subcutaneous, Nightly  insulin lispro, 0-7 Units, Subcutaneous, TID AC  levETIRAcetam, 500 mg, Oral, BID  sodium hypochlorite, , Topical, Q12H        Continuous Infusions:       PRN Meds:  •  acetaminophen  •  albumin human  •  dextrose  •  dextrose  •  glucagon (human recombinant)  •  HYDROcodone-acetaminophen  •  ondansetron  •  [COMPLETED] Insert peripheral IV **AND** sodium chloride    Intake/Output:    Intake/Output Summary (Last 24 hours) at 2021 2352  Last data filed at 2021 1400  Gross per 24 hour   Intake 300 ml   Output 2000 ml   Net -1700 ml         Exam:    T MAX 24 hrs: Temp (24hrs), Av.8 °F (36.6 °C), Min:97.8 °F (36.6 °C), Max:97.8 °F (36.6 °C)    Vitals Ranges:   Temp:  [97.8 °F (36.6 °C)] 97.8 °F (36.6 °C)  Heart Rate:  [74-90] 74  Resp:  [18] 18  BP: ()/(59-80) 102/68    /68  "(BP Location: Left arm, Patient Position: Lying)   Pulse 74   Temp 97.8 °F (36.6 °C) (Oral)   Resp 18   Ht 177 cm (69.69\")   Wt 89.4 kg (197 lb)   SpO2 96%   BMI 28.52 kg/m²     General: Alert, oriented x1-2.  Cooperative.  No pain at this time.    Neck: Supple, symmetrical, trachea midline, no adenopathy;              thyroid:  no enlargement/tenderness/nodules;              no carotid bruit or JVD  Cardiovascular: Normal rate, regular rhythm and intact distal pulses.              Exam reveals no gallop and no friction rub. No murmur heard  Pulmonary: Clear to auscultation bilaterally, respirations unlabored.               No rhonchi, wheezing or rales.   Abdominal: Soft. Soft, nontender, bowel sounds active all four quadrants,     no masses, no hepatomegaly, no splenomegaly.   Extremities: Left hand contraction.  Left hemiparesis from previous stroke  Neurological: Patient is awake oriented x2.  No new focal sensorimotor deficit.    Skin: Wound in sacral area covered with dressings.        Data Review:    Results from last 7 days   Lab Units 12/15/21  1322 12/14/21  0508 12/13/21  0804   WBC 10*3/mm3 15.50* 15.33* 13.16*   HEMOGLOBIN g/dL 9.9* 9.1* 9.9*   HEMATOCRIT % 29.0* 27.3* 30.0*   PLATELETS 10*3/mm3 269 316 291       Results from last 7 days   Lab Units 12/15/21  2222 12/15/21  1322 12/14/21  0508 12/13/21  0804 12/13/21  0804   SODIUM mmol/L  --  137 134*  --  137   POTASSIUM mmol/L 4.4 3.8 5.0   < > 4.6   CHLORIDE mmol/L  --  95* 94*  --  97*   CO2 mmol/L  --  25.5 23.3  --  22.2   BUN mg/dL  --  48* 72*  --  61*   CREATININE mg/dL  --  4.64* 5.91*  --  5.67*   CALCIUM mg/dL  --  9.7 9.6  --  9.7   BILIRUBIN mg/dL  --  0.3 0.3  --  0.3   ALK PHOS U/L  --  117 108  --  101   ALT (SGPT) U/L  --  15 19  --  21   AST (SGOT) U/L  --  15 18  --  25   GLUCOSE mg/dL  --  188* 179*  --  200*    < > = values in this interval not displayed.                 Lab Results   Lab Value Date/Time    TROPONINT 3.510 " (C) 12/15/2021 2222    TROPONINT 3.810 (C) 12/15/2021 1322    TROPONINT 3.070 (C) 12/14/2021 0508    TROPONINT 1.840 (C) 12/12/2021 0521    TROPONINT 2.020 (C) 12/11/2021 1159    TROPONINT 1.210 (C) 12/10/2021 0456    TROPONINT 0.464 (C) 12/09/2021 1646    TROPONINT 0.037 (C) 02/17/2021 1435    TROPONINT 0.033 (C) 02/17/2021 1216    TROPONINT 0.045 (H) 01/22/2019 1255    TROPONINT 0.052 (H) 01/22/2019 0357    TROPONINT 0.051 (H) 01/21/2019 1108    TROPONINT 0.073 (H) 06/13/2016 0640    TROPONINT 0.02 09/30/2015 0258       Microbiology Results (last 10 days)     Procedure Component Value - Date/Time    CANDIDA AURIS SCREEN - Swab, Axilla Right, Axilla Left and Groin [[id number]]  (Normal) Collected: 12/10/21 0928    Lab Status: Final result Specimen: Swab from Axilla Right, Axilla Left and Groin Updated: 12/15/21 1700     [place] Auris Screen Culture No Candida auris isolated at 5 days    Blood Culture - Blood, Arm, Right [[id number]]  (Normal) Collected: 12/09/21 2032    Lab Status: Final result Specimen: Blood from Arm, Right Updated: 12/14/21 2045     Blood Culture No growth at 5 days    Blood Culture - Blood, Arm, Left [[id number]]  (Abnormal) Collected: 12/09/21 1818    Lab Status: Final result Specimen: Blood from Arm, Left Updated: 12/16/21 1323     Blood Culture Cutibacterium acnes     Isolated from Anaerobic Bottle     Gram Stain Anaerobic Bottle Gram positive bacilli    Narrative:      Blood culture does not meet the specified criteria for PCR identification.  If pregnant, immunocompromised, or clinical concern for meningitis, call lab to run BCID for Listeria monocytogenes.    Respiratory Panel PCR w/COVID-19(SARS-CoV-2) [facility]/JESSIE/HIMANSHU/PAD/[facility]/M[facility] In-House, NP Swab in UTM/VTM, 3-4 HR TAT - Swab, Nasopharynx [[id number]]  (Abnormal) Collected: 12/09/21 1804    Lab Status: Final result Specimen: Swab from Nasopharynx Updated: 12/09/21 1911     ADENOVIRUS, PCR Not Detected     Coronavirus 229E Not Detected      Coronavirus HKU1 Not Detected     Coronavirus NL63 Not Detected     Coronavirus OC43 Not Detected     COVID19 Detected     Human Metapneumovirus Not Detected     Human Rhinovirus/Enterovirus Not Detected     Influenza A PCR Not Detected     Influenza B PCR Not Detected     Parainfluenza Virus 1 Not Detected     Parainfluenza Virus 2 Not Detected     Parainfluenza Virus 3 Not Detected     Parainfluenza Virus 4 Not Detected     RSV, PCR Not Detected     Bordetella pertussis pcr Not Detected     Bordetella parapertussis PCR Not Detected     Chlamydophila pneumoniae PCR Not Detected     Mycoplasma pneumo by PCR Not Detected    Narrative:      In the setting of a positive respiratory panel with a viral infection PLUS a negative procalcitonin without other underlying concern for bacterial infection, consider observing off antibiotics or discontinuation of antibiotics and continue supportive care. If the respiratory panel is positive for atypical bacterial infection (Bordetella pertussis, Chlamydophila pneumoniae, or Mycoplasma pneumoniae), consider antibiotic de-escalation to target atypical bacterial infection.           Imaging Results (Last 7 Days)     ** No results found for the last 168 hours. **            Assessment:        Pneumonia due to COVID-19 virus    Cytokine release syndrome, grade 2  Acute respiratory failure with hypoxia  Volume overload  End-stage renal disease on dialysis  Elevated troponin.  Diabetes mellitus 2  Anemia, chronic kidney disease  Seizure disorder  Coronary artery disease  Hypertension  Hypokalemia        Plan:    Patient now in room air, saturation 90-92%.  I believe patient oxygen requirements were not that high, at the most 4-5 L of oxygen, unfortunately patient's O2 sats sensor was overhead which she was moving very often so so patient would not cardiac and that was increasing her oxygen up to 15 L when she did not need it.  Patient is 16-day after becoming positive to COVID-19  infection, she is on room air, recovering, ambulation should be taken off isolation and continue her cardiovascular work-up.   We will start dexamethasone   Hemodialysis per renal  Does have diagnosis of type II non-STEMI the patient might, medical treatment only at this time.    Adjust insulin as needed, blood sugar okay  Continue DVT/stress ulcer prophylaxis  Is to have cardiovascular work-up before discharge.  Labs in am      Rhys Mary MD  12/16/2021           I wore protective equipment throughout this patient encounter including a face mask, gloves, and protective eyewear.  Hand hygiene was performed before donning protective equipment and after removal when leaving the room.

## 2021-12-17 NOTE — PROGRESS NOTES
"  Infectious Diseases Progress Note    Jack Baird MD     [facility]  [place]: 8 days  Patient Identification:  Name: Carolina Bhat  Age: 61 y.o.  Sex: female  :  1960  MRN: [id number]         Primary Care Physician: Provider, No Known            Subjective: Confused and conversive and wants me to get something so that her mouth is not dry.  Interval History: See consultation note  Maintain oxygen saturation greater than 92% on room air.  Objective:    Scheduled Meds:aspirin, 81 mg, Oral, Daily  atorvastatin, 60 mg, Oral, Nightly  carvedilol, 12.5 mg, Oral, BID With Meals  dexamethasone, 6 mg, Oral, Daily With Breakfast  famotidine, 20 mg, Oral, Daily  heparin (porcine), 5,000 Units, Subcutaneous, Q12H  insulin glargine, 10 Units, Subcutaneous, Nightly  insulin lispro, 0-7 Units, Subcutaneous, TID AC  levETIRAcetam, 500 mg, Oral, BID  sodium hypochlorite, , Topical, Q12H      Continuous Infusions:     Vital signs in last 24 hours:  Temp:  [96.1 °F (35.6 °C)-98.1 °F (36.7 °C)] 96.1 °F (35.6 °C)  Heart Rate:  [74-88] 87  Resp:  [18-20] 20  BP: (101-151)/(56-83) 151/83    Intake/Output:    Intake/Output Summary (Last 24 hours) at 2021 0957  Last data filed at 2021 1400  Gross per 24 hour   Intake 120 ml   Output --   Net 120 ml       Exam:  /83 (BP Location: Left arm, Patient Position: Lying)   Pulse 87   Temp 96.1 °F (35.6 °C) (Oral)   Resp 20   Ht 177 cm (69.69\")   Wt 89.4 kg (197 lb)   SpO2 92%   BMI 28.52 kg/m²   Patient is examined using the personal protective equipment as per guidelines from infection control for this particular patient as enacted.  Hand washing was performed before and after patient interaction.  General Appearance:    Comfortable nontoxic appearing chronically ill-appearing female who does not appear to be in any acute distress   Head:    Normocephalic, without obvious abnormality, atraumatic   Eyes:    PERRL, conjunctivae/corneas clear, " EOM's intact, both eyes   Ears:    Normal external ear canals, both ears   Nose:   Nares normal, septum midline, mucosa normal, no drainage    or sinus tenderness   Throat:   Lips, tongue, gums normal; oral mucosa pink and moist   Neck:   Supple, symmetrical, trachea midline, no adenopathy;     thyroid:  no enlargement/tenderness/nodules; no carotid    bruit or JVD   Back:     Symmetric, no curvature, ROM normal, no CVA tenderness   Lungs:     Clear to auscultation bilaterally, respirations unlabored   Chest Wall:    No tenderness or deformity    Heart:    Regular rate and rhythm, S1 and S2 normal, no murmur, rub   or gallop   Abdomen:     Soft, non-tender, bowel sounds active all four quadrants,     no masses, no hepatomegaly, no splenomegaly   Extremities:  Contractures of lower extremities.  Right upper arm AV fistula in place   Pulses:   Pulses palpable in all extremities; symmetric all extremities   Skin:   Skin color normal, Skin is warm and dry,  no rashes or palpable lesions   Neurologic:  Residual neurological deficit with left-sided weakness            Data Review:    I reviewed the patient's new clinical results.  Results from last 7 days   Lab Units 12/17/21  0513 12/15/21  1322 12/14/21  0508 12/13/21  0804 12/12/21  0521 12/11/21  1159   WBC 10*3/mm3 12.97* 15.50* 15.33* 13.16* 8.73 7.99   HEMOGLOBIN g/dL 9.5* 9.9* 9.1* 9.9* 10.0* 9.1*   PLATELETS 10*3/mm3 297 269 316 291 303 296     Results from last 7 days   Lab Units 12/17/21  0513 12/16/21  2307 12/15/21  2222 12/15/21  1322 12/14/21  0508 12/13/21  0804 12/12/21  0521 12/11/21  1159 12/11/21  1159   SODIUM mmol/L 138 137  --  137 134* 137 137  --  139   POTASSIUM mmol/L 4.4 4.4 4.4 3.8 5.0 4.6 3.4*   < > 3.5   CHLORIDE mmol/L 95* 95*  --  95* 94* 97* 97*  --  97*   CO2 mmol/L 26.3 25.7  --  25.5 23.3 22.2 23.5  --  26.2   BUN mg/dL 43* 41*  --  48* 72* 61* 44*  --  32*   CREATININE mg/dL 3.70* 3.36*  --  4.64* 5.91* 5.67* 4.55*  --  3.67*    CALCIUM mg/dL 9.8 9.7  --  9.7 9.6 9.7 9.7  --  9.5   GLUCOSE mg/dL 208* 243*  --  188* 179* 200* 238*  --  156*    < > = values in this interval not displayed.     Microbiology Results (last 10 days)     Procedure Component Value - Date/Time    CANDIDA AURIS SCREEN - Swab, Axilla Right, Axilla Left and Groin [[id number]]  (Normal) Collected: 12/10/21 0928    Lab Status: Final result Specimen: Swab from Axilla Right, Axilla Left and Groin Updated: 12/15/21 1700     [facility] Auris Screen Culture No Candida auris isolated at 5 days    Blood Culture - Blood, Arm, Right [[id number]]  (Normal) Collected: 12/09/21 2032    Lab Status: Final result Specimen: Blood from Arm, Right Updated: 12/14/21 2045     Blood Culture No growth at 5 days    Blood Culture - Blood, Arm, Left [[id number]]  (Abnormal) Collected: 12/09/21 1818    Lab Status: Final result Specimen: Blood from Arm, Left Updated: 12/16/21 1323     Blood Culture Cutibacterium acnes     Isolated from Anaerobic Bottle     Gram Stain Anaerobic Bottle Gram positive bacilli    Narrative:      Blood culture does not meet the specified criteria for PCR identification.  If pregnant, immunocompromised, or clinical concern for meningitis, call lab to run BCID for Listeria monocytogenes.    Respiratory Panel PCR w/COVID-19(SARS-CoV-2) [facility]O/JESSIE/HIMANSHU/PAD/[facility]/MAD/KEMAR In-House, NP Swab in UTM/VTM, 3-4 HR TAT - Swab, Nasopharynx [[id number]]  (Abnormal) Collected: 12/09/21 1804    Lab Status: Final result Specimen: Swab from Nasopharynx Updated: 12/09/21 1911     ADENOVIRUS, PCR Not Detected     Coronavirus 229E Not Detected     Coronavirus HKU1 Not Detected     Coronavirus NL63 Not Detected     Coronavirus OC43 Not Detected     COVID19 Detected     Human Metapneumovirus Not Detected     Human Rhinovirus/Enterovirus Not Detected     Influenza A PCR Not Detected     Influenza B PCR Not Detected     Parainfluenza Virus 1 Not Detected     Parainfluenza Virus 2 Not Detected      Parainfluenza Virus 3 Not Detected     Parainfluenza Virus 4 Not Detected     RSV, PCR Not Detected     Bordetella pertussis pcr Not Detected     Bordetella parapertussis PCR Not Detected     Chlamydophila pneumoniae PCR Not Detected     Mycoplasma pneumo by PCR Not Detected    Narrative:      In the setting of a positive respiratory panel with a viral infection PLUS a negative procalcitonin without other underlying concern for bacterial infection, consider observing off antibiotics or discontinuation of antibiotics and continue supportive care. If the respiratory panel is positive for atypical bacterial infection (Bordetella pertussis, Chlamydophila pneumoniae, or Mycoplasma pneumoniae), consider antibiotic de-escalation to target atypical bacterial infection.            Assessment:    Pneumonia due to COVID-19 virus    Cytokine release syndrome, grade 2  1-systemic COVID-19 infection with  2-COVID-19 pneumonia with hypoxia further confounded by  3-possible volume overload in the setting of end-stage renal disease on hemodialysis  4-history of CVA  5-vascular dementia  6-diabetes mellitus  7-immobilization syndrome  8-gram-positive bacilli blood culture on day 5 of incubation likely contaminant during the process of collection does not require any further intervention and work-up  9-steroid induced leukocytosis     Recommendations/Discussions:  · Does not need any work-up for positive blood culture is contaminant during the process of collection.  · Agree with decision making per primary team.  · Continue supportive care.  · Patient is stable from infectious disease standpoint and will sign off.  Please call us if needed.    Jack Baird MD  12/17/2021  13:40  Much of this encounter note is an electronic transcription/translation of spoken language to printed text. The electronic translation of spoken language may permit erroneous, or at times, nonsensical words or phrases to be inadvertently transcribed; Although  I have reviewed the note for such errors, some may still exist

## 2021-12-17 NOTE — PROGRESS NOTES
"  Infectious Diseases Progress Note    Jack Baird MD     [facility]  [place]: 7 days  Patient Identification:  Name: Carolina Bhat  Age: 61 y.o.  Sex: female  :  1960  MRN: [id number]         Primary Care Physician: Provider, No Known            Subjective: Confused and moves around.  After adjustment of her oxygen measuring device she is noted to be able to maintain oxygen saturation 90 to 92% on room air.  Interval History: See consultation note  Oxygen requirement seems to have stabilized.  Objective:    Scheduled Meds:aspirin, 81 mg, Oral, Daily  atorvastatin, 60 mg, Oral, Nightly  carvedilol, 12.5 mg, Oral, BID With Meals  dexamethasone, 6 mg, Oral, Daily With Breakfast  famotidine, 20 mg, Oral, Daily  heparin (porcine), 5,000 Units, Subcutaneous, Q12H  insulin glargine, 10 Units, Subcutaneous, Nightly  insulin lispro, 0-7 Units, Subcutaneous, TID AC  levETIRAcetam, 500 mg, Oral, BID  sodium hypochlorite, , Topical, Q12H      Continuous Infusions:     Vital signs in last 24 hours:  Temp:  [97 °F (36.1 °C)-99 °F (37.2 °C)] 97 °F (36.1 °C)  Heart Rate:  [] 88  Resp:  [18-22] 18  BP: ()/(59-83) 110/80    Intake/Output:    Intake/Output Summary (Last 24 hours) at 2021  Last data filed at 2021 1400  Gross per 24 hour   Intake 300 ml   Output 2000 ml   Net -1700 ml       Exam:  /80 (BP Location: Left arm, Patient Position: Lying)   Pulse 88   Temp 97 °F (36.1 °C) (Oral)   Resp 18   Ht 177 cm (69.69\")   Wt 89.4 kg (197 lb)   SpO2 92%   BMI 28.52 kg/m²   Patient is examined using the personal protective equipment as per guidelines from infection control for this particular patient as enacted.  Hand washing was performed before and after patient interaction.  General Appearance:   Chronically ill-appearing female who does not appear to be in any acute distress   Head:    Normocephalic, without obvious abnormality, atraumatic   Eyes:    PERRL, " conjunctivae/corneas clear, EOM's intact, both eyes   Ears:    Normal external ear canals, both ears   Nose:   Nares normal, septum midline, mucosa normal, no drainage    or sinus tenderness   Throat:   Lips, tongue, gums normal; oral mucosa pink and moist   Neck:   Supple, symmetrical, trachea midline, no adenopathy;     thyroid:  no enlargement/tenderness/nodules; no carotid    bruit or JVD   Back:     Symmetric, no curvature, ROM normal, no CVA tenderness   Lungs:     Clear to auscultation bilaterally, respirations unlabored   Chest Wall:    No tenderness or deformity    Heart:    Regular rate and rhythm, S1 and S2 normal, no murmur, rub   or gallop   Abdomen:     Soft, non-tender, bowel sounds active all four quadrants,     no masses, no hepatomegaly, no splenomegaly   Extremities:  Contractures of lower extremities.  Right upper arm AV fistula in place   Pulses:   Pulses palpable in all extremities; symmetric all extremities   Skin:   Skin color normal, Skin is warm and dry,  no rashes or palpable lesions   Neurologic:  Residual neurological deficit with left-sided weakness            Data Review:    I reviewed the patient's new clinical results.  Results from last 7 days   Lab Units 12/15/21  1322 12/14/21  0508 12/13/21  0804 12/12/21  0521 12/11/21  1159 12/10/21  0456   WBC 10*3/mm3 15.50* 15.33* 13.16* 8.73 7.99 6.55   HEMOGLOBIN g/dL 9.9* 9.1* 9.9* 10.0* 9.1* 8.7*   PLATELETS 10*3/mm3 269 316 291 303 296 265     Results from last 7 days   Lab Units 12/15/21  2222 12/15/21  1322 12/14/21  0508 12/13/21  0804 12/12/21  0521 12/11/21  1159 12/10/21  0456   SODIUM mmol/L  --  137 134* 137 137 139 135*   POTASSIUM mmol/L 4.4 3.8 5.0 4.6 3.4* 3.5 4.2   CHLORIDE mmol/L  --  95* 94* 97* 97* 97* 97*   CO2 mmol/L  --  25.5 23.3 22.2 23.5 26.2 19.3*   BUN mg/dL  --  48* 72* 61* 44* 32* 58*   CREATININE mg/dL  --  4.64* 5.91* 5.67* 4.55* 3.67* 6.12*   CALCIUM mg/dL  --  9.7 9.6 9.7 9.7 9.5 9.3   GLUCOSE mg/dL  --   188* 179* 200* 238* 156* 197*     Microbiology Results (last 10 days)     Procedure Component Value - Date/Time    CANDIDA AURIS SCREEN - Swab, Axilla Right, Axilla Left and Groin [[id number]]  (Normal) Collected: 12/10/21 0928    Lab Status: Final result Specimen: Swab from Axilla Right, Axilla Left and Groin Updated: 12/15/21 1700     Anabell Auris Screen Culture No Candida auris isolated at 5 days    Blood Culture - Blood, Arm, Right [[id number]]  (Normal) Collected: 12/09/21 2032    Lab Status: Final result Specimen: Blood from Arm, Right Updated: 12/14/21 2045     Blood Culture No growth at 5 days    Blood Culture - Blood, Arm, Left [[id number]]  (Abnormal) Collected: 12/09/21 1818    Lab Status: Final result Specimen: Blood from Arm, Left Updated: 12/16/21 1323     Blood Culture Cutibacterium acnes     Isolated from Anaerobic Bottle     Gram Stain Anaerobic Bottle Gram positive bacilli    Narrative:      Blood culture does not meet the specified criteria for PCR identification.  If pregnant, immunocompromised, or clinical concern for meningitis, call lab to run BCID for Listeria monocytogenes.    Respiratory Panel PCR w/COVID-19(SARS-CoV-2) CH[facility]ITO/JESSIE/HIMANSHU/PAD/COR/MAD/KEMAR In-House, NP Swab in UTM/VTM, 3-4 HR TAT - Swab, Nasopharynx [[id number]]  (Abnormal) Collected: 12/09/21 1804    Lab Status: Final result Specimen: Swab from Nasopharynx Updated: 12/09/21 1911     ADENOVIRUS, PCR Not Detected     Coronavirus 229E Not Detected     Coronavirus HKU1 Not Detected     Coronavirus NL63 Not Detected     Coronavirus OC43 Not Detected     COVID19 Detected     Human Metapneumovirus Not Detected     Human Rhinovirus/Enterovirus Not Detected     Influenza A PCR Not Detected     Influenza B PCR Not Detected     Parainfluenza Virus 1 Not Detected     Parainfluenza Virus 2 Not Detected     Parainfluenza Virus 3 Not Detected     Parainfluenza Virus 4 Not Detected     RSV, PCR Not Detected     Bordetella pertussis pcr Not Detected      Bordetella parapertussis PCR Not Detected     Chlamydophila pneumoniae PCR Not Detected     Mycoplasma pneumo by PCR Not Detected    Narrative:      In the setting of a positive respiratory panel with a viral infection PLUS a negative procalcitonin without other underlying concern for bacterial infection, consider observing off antibiotics or discontinuation of antibiotics and continue supportive care. If the respiratory panel is positive for atypical bacterial infection (Bordetella pertussis, Chlamydophila pneumoniae, or Mycoplasma pneumoniae), consider antibiotic de-escalation to target atypical bacterial infection.            Assessment:    Pneumonia due to COVID-19 virus    Cytokine release syndrome, grade 2  1-systemic COVID-19 infection with  2-COVID-19 pneumonia with hypoxia further confounded by  3-possible volume overload in the setting of end-stage renal disease on hemodialysis  4-history of CVA  5-vascular dementia  6-diabetes mellitus  7-immobilization syndrome  8-gram-positive bacilli blood culture on day 5 of incubation likely contaminant during the process of collection does not require any further intervention and work-up        Recommendations/Discussions:  · Does not need any work-up for positive blood culture is contaminant during the process of collection.  · Agree with decision making per primary team.  · Continue supportive care.    Jack Baird MD  12/16/2021  13:40  Much of this encounter note is an electronic transcription/translation of spoken language to printed text. The electronic translation of spoken language may permit erroneous, or at times, nonsensical words or phrases to be inadvertently transcribed; Although I have reviewed the note for such errors, some may still exist

## 2021-12-18 NOTE — PLAN OF CARE
Goal Outcome Evaluation:  Plan of Care Reviewed With: patient        Progress: no change   Patient alert x 1. Patient total care. Patient to have wound debridment on coccyx. Patient having dialysis this shift. Continue to monitor.

## 2021-12-18 NOTE — NURSING NOTE
Goal Outcome Evaluation:         Pt's daughter updated on respiratory decline and has decided to move forward with palliative care measures. Daughter is at bedside and iPad being used to communicate with other family members. Awaiting bed availability on palliative care unit. Pain managed per pt/fam request. Fluids provided per fam request. Will CTM.

## 2021-12-18 NOTE — PROGRESS NOTES
Addendum:  Events of today are noted.  Patient is obviously declined overnight.  Plans are for comfort care.  I have canceled her debridement for tomorrow.  Please call again as needed.      Follow-up sacral decubitus wound    Patient with a respiratory decompensation after dialysis, currently requiring 15 L nonrebreather. She was on no oxygen when I saw her last night.    Objective:  Afebrile, vital stable, oxygenation low 90s on 15 L nonrebreather  General: Chronic ill appearance, mildly uncomfortable, disoriented  Abdomen: Soft, nondistended    Assessment and plan:  -Necrotic sacral decubitus wound related to immobility. This will require debridement  -Patient was on the schedule for debridement this morning but due to respiratory decompensation I discussed with the patient's daughter and with anesthesia. Anesthesia felt that she was very high risk to need to stay intubated postoperatively and the patient's daughter was clear that the patient did not want this. I am hopeful that this change in her respiratory status is simply her recovering from her dialysis, but obviously her Covid pneumonia is an issue. At the moment I do not think that this wound on the sacrum is driving her illness and I do not think that emergent debridement is necessary. I have tentatively placed the patient on the schedule for debridement tomorrow, and if her respiratory status is improved and it looks like the odds of getting her extubated postoperatively are good then we can proceed.    Jeevan Balderrama MD  General and Endoscopic Surgery  [facility]    [address], [address]  [place], [place], [address]  P: [phone]  F: [phone]

## 2021-12-18 NOTE — PROGRESS NOTES
DAILY PROGRESS NOTE  [facility], [facility]        Patient Identification:  Name: Carolina Bhat  Age: 61 y.o.  Sex: female  :  1960  MRN: [id number]           Primary Care Physician: Provider, No Known    Subjective:    Interval History:    This morning, patient saturation is 92% on room air.  Sleep are stable, blood pressure on the low side.  No more tachycardia.  Taking medication.  To have dialysis later on today.  Patient is day 17 after becoming positive for COVID-19 infection.  Has had several complications along with COVID-19, especially a non-ST elevation MI, on medical treatment,   Will still need further work-up.  Blood sugar up-and-down.  Patient has been refusing labs on and off.      ROS:     No nausea, vomiting, diarrhea, constipation, chest pain    Objective:    Scheduled Meds:  aspirin, 81 mg, Oral, Daily  atorvastatin, 60 mg, Oral, Nightly  carvedilol, 12.5 mg, Oral, BID With Meals  dexamethasone, 6 mg, Oral, Daily With Breakfast  famotidine, 20 mg, Oral, Daily  heparin (porcine), 5,000 Units, Subcutaneous, Q12H  insulin glargine, 10 Units, Subcutaneous, Nightly  insulin lispro, 0-7 Units, Subcutaneous, TID AC  levETIRAcetam, 500 mg, Oral, BID  sodium hypochlorite, , Topical, Q12H        Continuous Infusions:       PRN Meds:  •  acetaminophen  •  dextrose  •  dextrose  •  glucagon (human recombinant)  •  HYDROcodone-acetaminophen  •  ondansetron  •  [COMPLETED] Insert peripheral IV **AND** sodium chloride    Intake/Output:    Intake/Output Summary (Last 24 hours) at 2021 0910  Last data filed at 2021 0730  Gross per 24 hour   Intake 720 ml   Output 1600 ml   Net -880 ml         Exam:    T MAX 24 hrs: Temp (24hrs), Av.8 °F (36.6 °C), Min:97.3 °F (36.3 °C), Max:98.6 °F (37 °C)    Vitals Ranges:   Temp:  [97.3 °F (36.3 °C)-98.6 °F (37 °C)] 97.5 °F (36.4 °C)  Heart Rate:  [] 97  Resp:  [18-32] 32  BP: (106134)/(6496)  "119/71    /71 (BP Location: Left arm, Patient Position: Lying)   Pulse 97   Temp 97.5 °F (36.4 °C) (Temporal)   Resp (!) 32   Ht 175.3 cm (69\")   Wt 89.4 kg (197 lb)   SpO2 90%   BMI 29.09 kg/m²     General: Alert, oriented x1-2.  Cooperative.  No pain at this time.    Neck: Supple, symmetrical, trachea midline, no adenopathy;              thyroid:  no enlargement/tenderness/nodules;              no carotid bruit or JVD  Cardiovascular: Normal rate, regular rhythm and intact distal pulses.              Exam reveals no gallop and no friction rub. No murmur heard  Pulmonary: Clear to auscultation bilaterally, respirations unlabored.               No rhonchi, wheezing or rales.   Abdominal: Soft. Soft, nontender, bowel sounds active all four quadrants,     no masses, no hepatomegaly, no splenomegaly.   Extremities: Left hand contraction.  Left hemiparesis from previous stroke  Neurological: Patient is awake oriented x2.  No new focal sensorimotor deficit.    Skin: Wound in sacral area covered with dressings.        Data Review:    Results from last 7 days   Lab Units 12/17/21  0513 12/15/21  1322 12/14/21  0508   WBC 10*3/mm3 12.97* 15.50* 15.33*   HEMOGLOBIN g/dL 9.5* 9.9* 9.1*   HEMATOCRIT % 27.9* 29.0* 27.3*   PLATELETS 10*3/mm3 297 269 316       Results from last 7 days   Lab Units 12/17/21  0513 12/16/21  2307 12/15/21  2222 12/15/21  1322 12/15/21  1322 12/14/21  0508 12/14/21  0508   SODIUM mmol/L 138 137  --   --  137   < > 134*   POTASSIUM mmol/L 4.4 4.4 4.4   < > 3.8   < > 5.0   CHLORIDE mmol/L 95* 95*  --   --  95*   < > 94*   CO2 mmol/L 26.3 25.7  --   --  25.5   < > 23.3   BUN mg/dL 43* 41*  --   --  48*   < > 72*   CREATININE mg/dL 3.70* 3.36*  --   --  4.64*   < > 5.91*   CALCIUM mg/dL 9.8 9.7  --   --  9.7   < > 9.6   BILIRUBIN mg/dL 0.3  --   --   --  0.3  --  0.3   ALK PHOS U/L 119*  --   --   --  117  --  108   ALT (SGPT) U/L 13  --   --   --  15  --  19   AST (SGOT) U/L 14  --   --   -- "  15  --  18   GLUCOSE mg/dL 208* 243*  --   --  188*   < > 179*    < > = values in this interval not displayed.                 Lab Results   Lab Value Date/Time    TROPONINT 3.240 (C) 12/16/2021 2307    TROPONINT 3.510 (C) 12/15/2021 2222    TROPONINT 3.810 (C) 12/15/2021 1322    TROPONINT 3.070 (C) 12/14/2021 0508    TROPONINT 1.840 (C) 12/12/2021 0521    TROPONINT 2.020 (C) 12/11/2021 1159    TROPONINT 1.210 (C) 12/10/2021 0456    TROPONINT 0.464 (C) 12/09/2021 1646    TROPONINT 0.037 (C) 02/17/2021 1435    TROPONINT 0.033 (C) 02/17/2021 1216    TROPONINT 0.045 (H) 01/22/2019 1255    TROPONINT 0.052 (H) 01/22/2019 0357    TROPONINT 0.051 (H) 01/21/2019 1108    TROPONINT 0.073 (H) 06/13/2016 0640    TROPONINT 0.02 09/30/2015 0258       Microbiology Results (last 10 days)     Procedure Component Value - Date/Time    CANDIDA AURIS SCREEN - Swab, Axilla Right, Axilla Left and Groin [[id number]]  (Normal) Collected: 12/10/21 0928    Lab Status: Final result Specimen: Swab from Axilla Right, Axilla Left and Groin Updated: 12/15/21 1700     Anabell Auris Screen Culture No Candida auris isolated at 5 days    Blood Culture - Blood, Arm, Right [[id number]]  (Normal) Collected: 12/09/21 2032    Lab Status: Final result Specimen: Blood from Arm, Right Updated: 12/14/21 2045     Blood Culture No growth at 5 days    Blood Culture - Blood, Arm, Left [[id number]]  (Abnormal) Collected: 12/09/21 1818    Lab Status: Final result Specimen: Blood from Arm, Left Updated: 12/16/21 1323     Blood Culture Cutibacterium acnes     Isolated from Anaerobic Bottle     Gram Stain Anaerobic Bottle Gram positive bacilli    Narrative:      Blood culture does not meet the specified criteria for PCR identification.  If pregnant, immunocompromised, or clinical concern for meningitis, call lab to run BCID for Listeria monocytogenes.    Respiratory Panel PCR w/COVID-19(SARS-CoV-2) [facility]ITO/JESSIE/HIMANSHU/PAD/COR/MAD/KEMAR In-House, NP Swab in UTM/VTM, 3-4 HR TAT -  Swab, Nasopharynx [[id number]]  (Abnormal) Collected: 12/09/21 1804    Lab Status: Final result Specimen: Swab from Nasopharynx Updated: 12/09/21 1911     ADENOVIRUS, PCR Not Detected     Coronavirus 229E Not Detected     Coronavirus HKU1 Not Detected     Coronavirus NL63 Not Detected     Coronavirus OC43 Not Detected     COVID19 Detected     Human Metapneumovirus Not Detected     Human Rhinovirus/Enterovirus Not Detected     Influenza A PCR Not Detected     Influenza B PCR Not Detected     Parainfluenza Virus 1 Not Detected     Parainfluenza Virus 2 Not Detected     Parainfluenza Virus 3 Not Detected     Parainfluenza Virus 4 Not Detected     RSV, PCR Not Detected     Bordetella pertussis pcr Not Detected     Bordetella parapertussis PCR Not Detected     Chlamydophila pneumoniae PCR Not Detected     Mycoplasma pneumo by PCR Not Detected    Narrative:      In the setting of a positive respiratory panel with a viral infection PLUS a negative procalcitonin without other underlying concern for bacterial infection, consider observing off antibiotics or discontinuation of antibiotics and continue supportive care. If the respiratory panel is positive for atypical bacterial infection (Bordetella pertussis, Chlamydophila pneumoniae, or Mycoplasma pneumoniae), consider antibiotic de-escalation to target atypical bacterial infection.           Imaging Results (Last 7 Days)     ** No results found for the last 168 hours. **            Assessment:        Infected decubitus ulcer    Pneumonia due to COVID-19 virus    Cytokine release syndrome, grade 2  Acute respiratory failure with hypoxia  Volume overload  End-stage renal disease on dialysis  Elevated troponin.  Diabetes mellitus 2  Anemia, chronic kidney disease  Seizure disorder  Coronary artery disease  Hypertension  Hypokalemia  Non-ST elevation MI        Plan:    Patient is still very frail, however, stable.  Patient is recovering from non-ST elevation MI, Covid pneumonia,  still needs work-up for ischemia/coronary artery disease before discharge.  Patient is on DVT prophylaxis.  Oxygenation requirements have been improving.  We will continue dexamethasone   Hemodialysis per renal.  Had hemodialysis later on today.  Adjust insulin as needed, blood sugar okay  Continue DVT/stress ulcer prophylaxis  Labs in am      Rhys Mary MD  12/18/2021           I wore protective equipment throughout this patient encounter including a face mask, gloves, and protective eyewear.  Hand hygiene was performed before donning protective equipment and after removal when leaving the room.

## 2021-12-18 NOTE — PLAN OF CARE
Goal Outcome Evaluation:  Plan of Care Reviewed With: patient        Progress: declining  Outcome Summary: Pt alert and anxious r/t SOB and dyspnea. Pt requiring supplemental oxygen today, compared to none yesterday. pt's family updated regarding delcining condition and family agreed to begin comfort care/palliative care measures. Pulm consulted and agreed with plan to transition to palliative care. Bed ready on [address] and pt's family updated and will go with her to [address]. Resp therapy paged to help with transition to palliative care unit. Report given. Will CTM.

## 2021-12-18 NOTE — PROGRESS NOTES
[facility] Progress Note      Patient Name: Carolina Bhat  : 1960  MRN: [id number]  Primary Care Physician:  Provider, No Known  Date of admission: 2021    Subjective     Interval History:   Has no new complaints today.  Denies any fever or chills.  No chest pain or shortness of breath.  Disoriented to time and situation.  Complaining of back pain    Review of Systems:   As noted above    Objective     Vitals:   Temp:  [97.3 °F (36.3 °C)-98.6 °F (37 °C)] 97.5 °F (36.4 °C)  Heart Rate:  [] 97  Resp:  [18-32] 32  BP: (106-134)/(64-96) 119/71  Flow (L/min):  [2-15] 15    Intake/Output Summary (Last 24 hours) at 2021 0840  Last data filed at 2021 0730  Gross per 24 hour   Intake 720 ml   Output 1600 ml   Net -880 ml       Physical Exam:      Constitutional: Awake, difficult to understand .   HEENT: Sclera anicteric, no conjunctival injection  Neck: Supple, no thyromegaly, no lymphadenopathy, trachea at midline, no JVD  Respiratory: Bilateral basal crackles  Cardiovascular: RRR, no murmurs, no rubs or gallops, no carotid bruit  Gastrointestinal: Positive bowel sounds, abdomen is soft, nontender and nondistended  : No palpable bladder  Musculoskeletal: No edema, no clubbing or cyanosis  Psychiatric: Appropriate affect, cooperative  Neurologic: Left-sided weakness  Access: Right upper extremity AV fistula with good thrill    Scheduled Meds:     aspirin, 81 mg, Oral, Daily  atorvastatin, 60 mg, Oral, Nightly  carvedilol, 12.5 mg, Oral, BID With Meals  dexamethasone, 6 mg, Oral, Daily With Breakfast  famotidine, 20 mg, Oral, Daily  heparin (porcine), 5,000 Units, Subcutaneous, Q12H  insulin glargine, 10 Units, Subcutaneous, Nightly  insulin lispro, 0-7 Units, Subcutaneous, TID AC  levETIRAcetam, 500 mg, Oral, BID  sodium hypochlorite, , Topical, Q12H      IV Meds:        Results Reviewed:   I have personally reviewed the results from the time of this  admission to 12/18/2021 08:40 EST     Results from last 7 days   Lab Units 12/17/21  0513 12/16/21  2307 12/15/21  2222 12/15/21  1322 12/15/21  1322 12/14/21  0508 12/14/21  0508   SODIUM mmol/L 138 137  --   --  137   < > 134*   POTASSIUM mmol/L 4.4 4.4 4.4   < > 3.8   < > 5.0   CHLORIDE mmol/L 95* 95*  --   --  95*   < > 94*   CO2 mmol/L 26.3 25.7  --   --  25.5   < > 23.3   BUN mg/dL 43* 41*  --   --  48*   < > 72*   CREATININE mg/dL 3.70* 3.36*  --   --  4.64*   < > 5.91*   CALCIUM mg/dL 9.8 9.7  --   --  9.7   < > 9.6   BILIRUBIN mg/dL 0.3  --   --   --  0.3  --  0.3   ALK PHOS U/L 119*  --   --   --  117  --  108   ALT (SGPT) U/L 13  --   --   --  15  --  19   AST (SGOT) U/L 14  --   --   --  15  --  18   GLUCOSE mg/dL 208* 243*  --   --  188*   < > 179*    < > = values in this interval not displayed.       Estimated Creatinine Clearance: 19 mL/min (A) (by C-G formula based on SCr of 3.7 mg/dL (H)).    Results from last 7 days   Lab Units 12/15/21  2222 12/13/21  0804 12/11/21  1159   MAGNESIUM mg/dL 2.2  --   --    PHOSPHORUS mg/dL  --  2.6 2.6             Results from last 7 days   Lab Units 12/17/21  0513 12/15/21  1322 12/14/21  0508 12/13/21  0804 12/12/21  0521   WBC 10*3/mm3 12.97* 15.50* 15.33* 13.16* 8.73   HEMOGLOBIN g/dL 9.5* 9.9* 9.1* 9.9* 10.0*   PLATELETS 10*3/mm3 297 269 316 291 303             Assessment / Plan     ASSESSMENT:  1.  End-stage renal disease on maintenance in center hemodialysis on a Monday Wednesday Friday schedule.    2.  Hypertension with end-stage renal disease: Blood pressure controlled.  3.  Acute hypoxemic respiratory failure secondary to COVID-19 pneumonia versus volume overload.  4.  Hyperphosphatemia on binders now with hypophosphatemia we will start binders  5.  COVID-19 pneumonia.  6.  History of CVA  7.Diabetes mellitus type 2 diet controlled now.  8.  Anemia: Likely secondary to end-stage renal disease.  Iron panel not in favor of iron deficiency anemia  9.  Hypokalemia: we will replace        Plan:  Hemodialysis Monday  Surveillance labs    Thank you for involving us in the care of Carolina Bhat.  Please feel free to call with any questions.    Agustin Almanzar MD  12/18/21  08:40 [facility]    [facility]  [phone]      Much of this encounter note is an electronic transcription/translation of spoken language to printed text. The electronic translation of spoken language may permit erroneous, or at times, nonsensical words or phrases to be inadvertently transcribed; Although I have reviewed the note for such errors, some may still exist.

## 2021-12-18 NOTE — PROGRESS NOTES
RT called to bedside and assessed pt.  Pt very anxious and worried as RT entered. PT wearing 6L HFNC without water in chamber (this RT filled with water), NRB lying in bed on 5L, SPO2 in low 80's.  RT placed pt on NRB at 15 L and listened to pt's bs.  Pt bs are dimished but pt was moaning and complaining of not being able to breathe.  Pt currently getting dialysis; dialysis nurse stated the patient has been reacting like this for the last half hour.  RT asked hers RN if she has anything for her anxiety and RN stated she only has pain meds.  Pt has regained SPO2 to 95% on NRB.

## 2021-12-18 NOTE — CODE DOCUMENTATION
Patient Name:  Carolina Bhat  YOB: 1960  MRN:  [id number]  Admit Date:  12/9/2021    Visit Diagnoses:     ICD-10-CM ICD-9-CM   1. Pneumonia due to COVID-19 virus  U07.1 480.8    J12.82 079.89   2. ESRD on hemodialysis ([facility])  N18.6 585.6    Z99.2 V45.11   3. Immobility syndrome  M62.3 728.3   4. Hypoxia  R09.02 799.02   5. SVT (supraventricular tachycardia) ([facility])  I47.1 427.89   6. Pressure injury, unstageable, with infection ([facility])  L89.95 707.00    L08.9 707.25       Reason For Rapid:   Respiratory Changes    RN Communicated With:  Intensivist    Rapid Outcome:  Remained On Same Unit    Communication From Rapid Team:   Rapid response called for respiratory distress with COVID PNA. Pt is DNR/DNI. Patient found to be using accessory muscles to breathe, panicked, confused, and visibly short of breath. ABG ordered. CXR just done. Spoke with Dr. Grove who reports he has spoken to Dr. Mary and reviewed the chart. He asked for another call back when test results available. ABG shows severe hypoxia. Will attempt OptiFlow per Dr. Grove. Other staff communicated with pt's daughter who wants to consider palliative care and is en route to hospital.     Rapid ended with improvement with OptiFlow. Please call back with further concerns.     Most Recent Vital Signs  Temp:  [97.3 °F (36.3 °C)-98.6 °F (37 °C)] 97.5 °F (36.4 °C)  Heart Rate:  [] 132  Resp:  [18-36] 34  BP: (106-134)/(64-99) 112/99  SpO2:  [85 %-98 %] 92 %  on  Flow (L/min):  [2-15] 15;   Device (Oxygen Therapy): high-flow nasal cannula; nonrebreather mask    Labs:      Glucose   Date/Time Value Ref Range Status   12/18/2021 1050 171 (H) 70 - 130 mg/dL Final     Comment:     Meter: [id number] : [id number] Michele REYNOLDS   12/18/2021 0532 141 (H) 70 - 130 mg/dL Final     Comment:     Meter: [id number] : [id number] Amish GONZALEZ   12/17/2021 2127 392 (H) 70 - 130 mg/dL Final     Comment:     Meter: [id number]  : [id number] Kali Chacon JANET   12/17/2021 1613 381 (H) 70 - 130 mg/dL Final     Comment:     Meter: [id number] : [id number] Claudio REYNOLDS   12/17/2021 1206 256 (H) 70 - 130 mg/dL Final     Comment:     Meter: [id number] : [id number] Claudio Huffman NA   12/17/2021 0600 217 (H) 70 - 130 mg/dL Final     Comment:     Meter: [id number] : [id number] Sade Toro NA   12/16/2021 1940 236 (H) 70 - 130 mg/dL Final     Comment:     Meter: [id number] : [id number] Sade REYNOLDS     No results found for: SITE, ALLENTEST, PHART, EXY7NJE, PO2ART, RHX4ZGO, BASEEXCESS, C5DWYHOB, HGBBG, HCTABG, OXYHEMOGLOBI, METHHGBN, CARBOXYHGB, CO2CT, BAROMETRIC, MODALITY, FIO2  Results from last 7 days   Lab Units 12/17/21  0513 12/15/21  1322 12/14/21  0508 12/13/21  0804   WBC 10*3/mm3 12.97* 15.50* 15.33* 13.16*   HEMOGLOBIN g/dL 9.5* 9.9* 9.1* 9.9*   PLATELETS 10*3/mm3 297 269 316 291     Results from last 7 days   Lab Units 12/17/21  0513 12/16/21  2307 12/15/21  2222 12/15/21  1322 12/15/21  1322 12/14/21  0508 12/14/21  0508   SODIUM mmol/L 138 137  --   --  137   < > 134*   POTASSIUM mmol/L 4.4 4.4 4.4   < > 3.8   < > 5.0   CHLORIDE mmol/L 95* 95*  --   --  95*   < > 94*   CO2 mmol/L 26.3 25.7  --   --  25.5   < > 23.3   BUN mg/dL 43* 41*  --   --  48*   < > 72*   CREATININE mg/dL 3.70* 3.36*  --   --  4.64*   < > 5.91*   GLUCOSE mg/dL 208* 243*  --   --  188*   < > 179*   ALBUMIN g/dL 2.80*  --   --   --  2.50*  --  2.70*   BILIRUBIN mg/dL 0.3  --   --   --  0.3  --  0.3   ALK PHOS U/L 119*  --   --   --  117  --  108   AST (SGOT) U/L 14  --   --   --  15  --  18   ALT (SGPT) U/L 13  --   --   --  15  --  19    < > = values in this interval not displayed.   Estimated Creatinine Clearance: 19 mL/min (A) (by C-G formula based on SCr of 3.7 mg/dL (H)).  Results from last 7 days   Lab Units 12/18/21  0937 12/16/21  2307 12/16/21  2136 12/15/21  2222 12/15/21  1322 12/14/21  0508 12/12/21  0521   TROPONIN T ng/mL   --  3.240*  --  3.510* 3.810* 3.070* 1.840*   D DIMER QUANT MCGFEU/mL 3.84*  --  3.40*  --   --  3.19* 3.72*         No results found for: STREPPNEUAG, LEGANTIGENUR        NIH Stroke Scale:  n/a                                                         Please refer to full rapid documentation on summary page under Index / Code Timeline

## 2021-12-18 NOTE — NURSING NOTE
Called patient's daughter, Ida, for update on patient's symptomatic progressive respiratory decline. Informed her that pulmonary care has been paged to advance to an optiflow or bipap and at this point they may be looking at transition to palliative care. Daughter voiced interest in proceeding with palliative care, but she does not want any sedative type medications until she can see her mother.  approved visitation. She states she is on the way now. Md paged to update. Will CTM.

## 2021-12-18 NOTE — CONSULTS
Referring Provider: Dr. Mary  Reason for Consultation: Hypoxia    Patient Care Team:  Provider, No Known as PCP - General    Chief complaint:   Consulted for worsening hypoxemia    History of present illness:    Subjective   This is a 60-year-old female patient, resident of nursing home.  She was hospitalized on 12/9 for hypoxia.  She tested positive for COVID-19 10 days prior and received Regeneron.  On presentation, she was hypoxic with 70% on RA, corrected with supplemental oxygen.  She required only 2 L on initial presentation but then her oxygen requirement increased gradually up to 15 L/min on 12/12 then gradually wean down to 2 L oxygen until this morning when her oxygen requirement suddenly increased.  She is now on 15 L again.    She was treated with dexamethasone and Remdesevir.  She finished Remdesevir therapy but currently remains on dexamethasone 6 mg daily.    She has ESRD and on dialysis.  She had dialysis yesterday with removal of 1 L of fluid reportedly.    In addition, she had a necrotic decubitus ulcer and plan was to repair today.    Her course was complicated by non-STEMI and PSVT, now controlled with Coreg.    Later today, rapid response team was called about the patient's oxygenation getting worse and labored breathing.  Now she is on 15 L oxygen and she remains hypoxic.    On my evaluation, the patient has labored breathing and she is tachypneic.  She appears to be in distress.  She is also confused but she was able to tell me her name.    Labs reviewed    Review of Systems  Could not obtain due to respiratory distress and confusion.    History  Past Medical History:   Diagnosis Date   • Acid reflux    • Anemia    • Anxiety    • Bacteremia    • Chest pain    • Chronic constipation    • Chronic pain    • Coronary artery disease    • CVA (cerebral vascular accident) ([facility]) 2011, 2012   • Dementia ([facility])    • Depression    • DM (diabetes mellitus) type II controlled with renal  manifestation (HCC)    • Dyslipidemia    • Dysphagia     G-tube removed 2015   • Epilepsy (HCC)    • ESRD (end stage renal disease) (HCC) 2016    Started HD 2016  [facility]   • Flexion contractures    • Hard to intubate     pt does not know   • History of esophagitis    • HLD (hyperlipidemia)    • Hypertension    • 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 No pertinent family history.,   Social History     Socioeconomic History   • Marital status:    Tobacco Use   • Smoking status: Former Smoker   • Smokeless tobacco: Former User   Substance and Sexual Activity   • Alcohol use: No   • Drug use: No   • Sexual activity: Defer     E-cigarette/Vaping     E-cigarette/Vaping Substances     E-cigarette/Vaping Devices       ,   Medications Prior to Admission   Medication Sig Dispense Refill Last Dose   • atorvastatin (LIPITOR) 20 MG tablet Take 60 mg by mouth Every Night.   12/9/2021 at Unknown time   • carvedilol (COREG) 12.5 MG tablet Take 0.5 tablets by mouth 2 (Two) Times a Day With Meals. (Patient taking differently: Take 3.125 mg by mouth 2 (Two) Times a Day With Meals.)   12/9/2021 at Unknown time   • doxazosin (CARDURA) 4 MG tablet Take 0.5 tablets by mouth Daily.   12/9/2021 at Unknown time   • Ferric Citrate 1  MG(Fe) tablet Take 2 tablets by mouth 2 (Two) Times a Day Before Meals.   12/9/2021 at Unknown time   • HYDROcodone-acetaminophen (NORCO) 7.5-325 MG per  tablet Take 1 tablet by mouth Every 6 (Six) Hours As Needed for Moderate Pain . 120 tablet 0 12/9/2021 at Unknown time   • insulin glargine (Semglee) 100 UNIT/ML injection Inject 12 Units under the skin into the appropriate area as directed Every Night. Inject 12 units at bedtime, hold for blood sugar less than 180   12/9/2021 at Unknown time   • Lactobacillus (FLORANEX PO) Take 1 tablet by mouth 2 (Two) Times a Day.   12/9/2021 at Unknown time   • levETIRAcetam (KEPPRA) 500 MG tablet Take 1 tablet by mouth Every 12 (Twelve) Hours. 60 tablet 0 12/9/2021 at Unknown time   • Lidocaine-Prilocaine, Bulk, 2-2 % cream Apply 2.5 % topically to the appropriate area as directed See Admin Instructions. Apply to dialysis access arm topically in the morning every MWF for dialysis   Past Week at Unknown time   • Lifitegrast (Xiidra) 5 % ophthalmic solution Administer 1 drop to both eyes 2 (Two) Times a Day.   12/9/2021 at Unknown time   • Multiple Vitamins-Minerals (MULTIVITAMIN WITH MINERALS) tablet tablet Take 1 tablet by mouth Daily.   12/9/2021 at Unknown time   • pantoprazole (PROTONIX) 40 MG EC tablet Take 40 mg by mouth Daily.   12/9/2021 at Unknown time   • silver sulfadiazine (SILVADENE, SSD) 1 % cream Apply 1 application topically to the appropriate area as directed 2 (Two) Times a Day. Left buttocks   12/9/2021 at Unknown time   • simethicone (MYLICON) 80 MG chewable tablet Chew 80 mg 3 (Three) Times a Day.   12/9/2021 at Unknown time   • traZODone (DESYREL) 100 MG tablet Take 100 mg by mouth Every Night.   12/9/2021 at Unknown time   • acetaminophen (TYLENOL) 500 MG tablet Take 500 mg by mouth Every 6 (Six) Hours As Needed for Mild Pain .      • bisacodyl (DULCOLAX) 10 MG suppository Insert 10 mg into the rectum Daily As Needed for Constipation.      • docusate sodium (COLACE) 250 MG capsule Take 250 mg by mouth daily.      • ferrous sulfate 325 (65 FE) MG tablet Take 325 mg by mouth 2 (two) times a day.      •  insulin aspart (NOVOLOG FLEXPEN) 100 UNIT/ML solution pen-injector sc pen Inject  under the skin into the appropriate area as directed 3 (Three) Times a Day With Meals. Per SS: 200-249=2, 250-299=4, 300-349=6, 350-399=8, 400+=10      • ondansetron (ZOFRAN) 4 MG tablet Take 2 mg by mouth Every 8 (Eight) Hours As Needed for Nausea or Vomiting.   Unknown at Unknown time   • sevelamer (RENVELA) 800 MG tablet Take 800 mg by mouth 3 (Three) Times a Day With Meals.      , Scheduled Meds:  Morphine, 2 mg, Intravenous, Once  sodium hypochlorite, , Topical, Q12H     and Allergies:  Darvon [propoxyphene]    Objective     Vital Signs   Temp:  [97.3 °F (36.3 °C)-98.6 °F (37 °C)] 97.5 °F (36.4 °C)  Heart Rate:  [] 97  Resp:  [18-32] 32  BP: (106-134)/(64-96) 119/71    PPE used per hospital policy    Physical Exam:  Constitutional: In significant respiratory distress.  Tachypneic.  Shallow breathing noted.  Cannot finish sentence.  Eyes: Injected conjunctivae, EOMI. pupils equal reactive to light.  ENMT: Bass 3.  Dry tongue.  External ears normal.  Neck:  Trachea midline. No thyromegaly  Heart: Tachycardia with regular rhythm.  No audible murmur.  Lungs: Significant diminished breath sounds bilaterally especially on the right.  No audible crackles or wheezing on anterior auscultation but exam limited due to respiratory distress.  Use of accessory muscles noted.  Abdomen: Obese. Soft. No tenderness or dullness. No HSM.  Extremities: No cyanosis, clubbing or pitting edema.  Warm extremities and well-perfused.  Loss of muscle mass in the legs.  Neuro: Conscious, alert.  Moves all extremities but weak all over.  Symmetrical strength noted.  Psych: Anxious mood.  Affect could not be assessed due to the urgent situation.  Integumentary: No rash.  Normal skin turgor  Lymphatic: No palpable cervical or supraclavicular lymph nodes.      Diagnostic imaging:  I personally and independently reviewed the following images:     CXR  12/9/2021: bilateral pulmonary filtrates, diffuse on the left and mostly lower lobe consolidation on the right.      Assessment   Bilateral COVID-19 pneumonia  Worsening pulmonary infiltrates on the right: Fluid?  Aspiration.  Superimposed bacterial pneumonia?  Acute hypoxic respiratory failure, worse.  Respiratory alkalosis    Non-STEMI   PSVT  Decubitus ulcer    Recommendations:      Stat CXR obtained.  Worsening pulmonary infiltrates on the right noted.  Stat ABG obtained: Clearly patient's is hyperventilating.  She has respiratory alkalosis with PCO2 of 30 but severe hypoxemia with PO2 47 on 15 L.  H HFNC ordered.  She was placed on 6 L/minute 100% FiO2 when I evaluated her.  NRB mask 15 L was needed on top of that.  Check stat CRP.  Increase dexamethasone to 6 mg twice daily  NIPPV as needed  Initiate bronchodilators    Addendum:    Discussed with rapid response team several times and talked to the patient's daughter at bedside who decided to proceed with comfort measures.  This seems to be reasonable the multiple comorbidities and the severity of her illness.    Discussed with Dr. Mary earlier today before the patient decompensated significantly      Anneliese Grove MD  12/18/21  09:49 EST    CC time 36 minutes

## 2021-12-18 NOTE — NURSING NOTE
Patient having dialysis. Oxygen sat. Dropped related to probe not in place. Replaced probe and placed on non rebreather per respiratory. Respiratory to assess. Continue to monitor.

## 2021-12-18 NOTE — PROGRESS NOTES
DAILY PROGRESS NOTE  [facility], [facility]    2021    Patient Identification:  Name: Carolina Bhat  Age: 61 y.o.  Sex: female  :  1960  MRN: [id number]           Primary Care Physician: Provider, No Known    Subjective:    Interval History:    Events noted, was doing well on room air until a few minutes after dialysis, she went into respiratory distress, requiring more oxygen.  At this time she is on nonrebreathing Venturi mask and her saturation is 88-90%.  At times looks more confused.  She says she is hurting all over.  Has a 2D echo yesterday, left ventricular systolic function was okay, EF 55%.  Left ventricular diastolic function was consistent with grade 1 impaired relaxation, normal right ventricular cavity size and systolic function was noted.  [facility] for non-[facility] elevation MI at this time.  She states she is hurting all over when I asked her about chest pain.    Blood sugar up-and-down.      ROS:     No nausea, vomiting, diarrhea, constipation, chest pain    Objective:    Scheduled Meds:  aspirin, 81 mg, Oral, Daily  atorvastatin, 60 mg, Oral, Nightly  carvedilol, 12.5 mg, Oral, BID With Meals  dexamethasone, 6 mg, Oral, Daily With Breakfast  famotidine, 20 mg, Oral, Daily  heparin (porcine), 5,000 Units, Subcutaneous, Q12H  insulin glargine, 10 Units, Subcutaneous, Nightly  insulin lispro, 0-7 Units, Subcutaneous, TID AC  levETIRAcetam, 500 mg, Oral, BID  sodium hypochlorite, , Topical, Q12H        Continuous Infusions:       PRN Meds:  •  acetaminophen  •  dextrose  •  dextrose  •  glucagon (human recombinant)  •  HYDROcodone-acetaminophen  •  ondansetron  •  [COMPLETED] Insert peripheral IV **AND** sodium chloride    Intake/Output:    Intake/Output Summary (Last 24 hours) at 2021 0911  Last data filed at 2021 0730  Gross per 24 hour   Intake 720 ml   Output 1600 ml   Net -880 ml         Exam:    T MAX 24 hrs:  "Temp (24hrs), Av.8 °F (36.6 °C), Min:97.3 °F (36.3 °C), Max:98.6 °F (37 °C)    Vitals Ranges:   Temp:  [97.3 °F (36.3 °C)-98.6 °F (37 °C)] 97.5 °F (36.4 °C)  Heart Rate:  [] 97  Resp:  [18-32] 32  BP: (106-134)/(64-96) 119/71    /71 (BP Location: Left arm, Patient Position: Lying)   Pulse 97   Temp 97.5 °F (36.4 °C) (Temporal)   Resp (!) 32   Ht 175.3 cm (69\")   Wt 89.4 kg (197 lb)   SpO2 90%   BMI 29.09 kg/m²     General: Alert, oriented x1-2.  Cooperative.  No pain at this time.    Neck: Supple, symmetrical, trachea midline, no adenopathy;              thyroid:  no enlargement/tenderness/nodules;              no carotid bruit or JVD  Cardiovascular: Normal rate, regular rhythm and intact distal pulses.              Exam reveals no gallop and no friction rub. No murmur heard  Pulmonary: Clear to auscultation bilaterally, respirations unlabored.               No rhonchi, wheezing or rales.   Abdominal: Soft. Soft, nontender, bowel sounds active all four quadrants,     no masses, no hepatomegaly, no splenomegaly.   Extremities: Left hand contraction.  Left hemiparesis from previous stroke  Neurological: Patient is awake oriented x2.  No new focal sensorimotor deficit.    Skin: Wound in sacral area covered with dressings.        Data Review:    Results from last 7 days   Lab Units 21  0513 12/15/21  1322 21  0508   WBC 10*3/mm3 12.97* 15.50* 15.33*   HEMOGLOBIN g/dL 9.5* 9.9* 9.1*   HEMATOCRIT % 27.9* 29.0* 27.3*   PLATELETS 10*3/mm3 297 269 316       Results from last 7 days   Lab Units 21  0513 21  2307 12/15/21  2222 12/15/21  1322 12/15/21  1322 21  0508 21  0508   SODIUM mmol/L 138 137  --   --  137   < > 134*   POTASSIUM mmol/L 4.4 4.4 4.4   < > 3.8   < > 5.0   CHLORIDE mmol/L 95* 95*  --   --  95*   < > 94*   CO2 mmol/L 26.3 25.7  --   --  25.5   < > 23.3   BUN mg/dL 43* 41*  --   --  48*   < > 72*   CREATININE mg/dL 3.70* 3.36*  --   --  4.64*   < > " 5.91*   CALCIUM mg/dL 9.8 9.7  --   --  9.7   < > 9.6   BILIRUBIN mg/dL 0.3  --   --   --  0.3  --  0.3   ALK PHOS U/L 119*  --   --   --  117  --  108   ALT (SGPT) U/L 13  --   --   --  15  --  19   AST (SGOT) U/L 14  --   --   --  15  --  18   GLUCOSE mg/dL 208* 243*  --   --  188*   < > 179*    < > = values in this interval not displayed.                 Lab Results   Lab Value Date/Time    TROPONINT 3.240 (C) 12/16/2021 2307    TROPONINT 3.510 (C) 12/15/2021 2222    TROPONINT 3.810 (C) 12/15/2021 1322    TROPONINT 3.070 (C) 12/14/2021 0508    TROPONINT 1.840 (C) 12/12/2021 0521    TROPONINT 2.020 (C) 12/11/2021 1159    TROPONINT 1.210 (C) 12/10/2021 0456    TROPONINT 0.464 (C) 12/09/2021 1646    TROPONINT 0.037 (C) 02/17/2021 1435    TROPONINT 0.033 (C) 02/17/2021 1216    TROPONINT 0.045 (H) 01/22/2019 1255    TROPONINT 0.052 (H) 01/22/2019 0357    TROPONINT 0.051 (H) 01/21/2019 1108    TROPONINT 0.073 (H) 06/13/2016 0640    TROPONINT 0.02 09/30/2015 0258       Microbiology Results (last 10 days)     Procedure Component Value - Date/Time    CANDIDA AURIS SCREEN - Swab, Axilla Right, Axilla Left and Groin [[id number]]  (Normal) Collected: 12/10/21 0928    Lab Status: Final result Specimen: Swab from Axilla Right, Axilla Left and Groin Updated: 12/15/21 1700     Anabell Auris Screen Culture No Candida auris isolated at 5 days    Blood Culture - Blood, Arm, Right [[id number]]  (Normal) Collected: 12/09/21 2032    Lab Status: Final result Specimen: Blood from Arm, Right Updated: 12/14/21 2045     Blood Culture No growth at 5 days    Blood Culture - Blood, Arm, Left [[id number]]  (Abnormal) Collected: 12/09/21 1818    Lab Status: Final result Specimen: Blood from Arm, Left Updated: 12/16/21 1323     Blood Culture Cutibacterium acnes     Isolated from Anaerobic Bottle     Gram Stain Anaerobic Bottle Gram positive bacilli    Narrative:      Blood culture does not meet the specified criteria for PCR identification.  If  pregnant, immunocompromised, or clinical concern for meningitis, call lab to run BCID for Listeria monocytogenes.    Respiratory Panel PCR w/COVID-19(SARS-CoV-2) CHAYITO/JESSIE/HIMANSHU/PAD/COR/MAD/KEMAR In-House, NP Swab in UTM/VTM, 3-4 HR TAT - Swab, Nasopharynx [[id number]]  (Abnormal) Collected: 12/09/21 1804    Lab Status: Final result Specimen: Swab from Nasopharynx Updated: 12/09/21 1911     ADENOVIRUS, PCR Not Detected     Coronavirus 229E Not Detected     Coronavirus HKU1 Not Detected     Coronavirus NL63 Not Detected     Coronavirus OC43 Not Detected     COVID19 Detected     Human Metapneumovirus Not Detected     Human Rhinovirus/Enterovirus Not Detected     Influenza A PCR Not Detected     Influenza B PCR Not Detected     Parainfluenza Virus 1 Not Detected     Parainfluenza Virus 2 Not Detected     Parainfluenza Virus 3 Not Detected     Parainfluenza Virus 4 Not Detected     RSV, PCR Not Detected     Bordetella pertussis pcr Not Detected     Bordetella parapertussis PCR Not Detected     Chlamydophila pneumoniae PCR Not Detected     Mycoplasma pneumo by PCR Not Detected    Narrative:      In the setting of a positive respiratory panel with a viral infection PLUS a negative procalcitonin without other underlying concern for bacterial infection, consider observing off antibiotics or discontinuation of antibiotics and continue supportive care. If the respiratory panel is positive for atypical bacterial infection (Bordetella pertussis, Chlamydophila pneumoniae, or Mycoplasma pneumoniae), consider antibiotic de-escalation to target atypical bacterial infection.           Imaging Results (Last 7 Days)     ** No results found for the last 168 hours. **            Assessment:        Infected decubitus ulcer    Pneumonia due to COVID-19 virus    Cytokine release syndrome, grade 2  Acute respiratory failure with hypoxia  Volume overload  End-stage renal disease on dialysis  Elevated troponin.  Diabetes mellitus 2  Anemia, chronic  kidney disease  Seizure disorder  Coronary artery disease  Hypertension  Hypokalemia        Plan:    Unfortunately, after dialysis, patient duration dropped down she needs 15 L of oxygen to keep saturation around 88-98%.  Will consult pulmonary to see patient.  Cardiology already following.  We will check chest x-ray, troponin, patient just had a  non-STEMI and has not recovery from it yet.  Await consult recommendations.  Patient is DNR with conditions.  She may be appropriate for palliative care/comfort care if no improvement.  Patient is 17-day after becoming positive to COVID-19 infection  Adjust insulin as needed, blood sugar okay  Continue DVT/stress ulcer prophylaxis  Labs in am      Rhys Mary MD  12/18/2021           I wore protective equipment throughout this patient encounter including a face mask, gloves, and protective eyewear.  Hand hygiene was performed before donning protective equipment and after removal when leaving the room.

## 2021-12-19 NOTE — PLAN OF CARE
Goal Outcome Evaluation:  Plan of Care Reviewed With: patient           Outcome Summary: Pt c/o pain from coccyx wound. Medicated with morphine 4 mg x4. Turned q4 and prn. Optiflow and nonrebreather remain on. Coccyx dressing changed. Daughter at bedside through the night. Will continue pallaitive care

## 2021-12-19 NOTE — PROGRESS NOTES
DAILY PROGRESS NOTE  [facility], [facility]    2021    Patient Identification:  Name: Carolina Bhat  Age: 61 y.o.  Sex: female  :  1960  MRN: [id number]           Primary Care Physician: Provider, No Known    Subjective:    Interval History:    Patient respiratory distress became worse yesterday, blood pressure went down as well, she became tachycardic and tachypneic.  Family decided to change her care structure to palliative care/comfort measures.  This was done last night.  Patient was transferred to our palliative care unit.  Patient is at this time on our palliative care orders, she is comfortable.  Family at bedside, all pleased with care.  Patient is on OptiFlow at this time.      ROS:     No nausea, vomiting, diarrhea, constipation, chest pain    Objective:    Scheduled Meds:  sodium hypochlorite, , Topical, Q12H        Continuous Infusions:       PRN Meds:  •  acetaminophen **OR** acetaminophen **OR** acetaminophen  •  acetaminophen  •  diphenhydrAMINE **OR** diphenhydrAMINE  •  diphenoxylate-atropine  •  Glycerin-Hypromellose-  •  glycopyrrolate **OR** glycopyrrolate **OR** glycopyrrolate **OR** glycopyrrolate  •  HYDROcodone-acetaminophen  •  HYDROmorphone **OR** Morphine **OR** morphine **OR** ketorolac  •  HYDROmorphone **OR** Morphine **OR** morphine  •  HYDROmorphone **OR** Morphine **OR** morphine  •  LORazepam **OR** LORazepam **OR** LORazepam  •  LORazepam **OR** LORazepam **OR** LORazepam  •  LORazepam **OR** LORazepam **OR** LORazepam  •  magic mouthwash  •  ondansetron  •  ondansetron **OR** ondansetron  •  promethazine **OR** promethazine **OR** promethazine  •  [COMPLETED] Insert peripheral IV **AND** sodium chloride    Intake/Output:  No intake or output data in the 24 hours ending 21 0907      Exam:    T MAX 24 hrs: Temp (24hrs), Av °F (36.7 °C), Min:98 °F (36.7 °C), Max:98 °F (36.7 °C)    Vitals Ranges:   Temp:   "[98 °F (36.7 °C)] 98 °F (36.7 °C)  Heart Rate:  [110-132] 110  Resp:  [24-40] 24  BP: ()/(40-99) 72/40    BP (!) 72/40 (BP Location: Left arm, Patient Position: Lying)   Pulse 110   Temp 98 °F (36.7 °C) (Axillary)   Resp 24   Ht 175.3 cm (69\")   Wt 89.4 kg (197 lb)   SpO2 94%   BMI 29.09 kg/m²     General: Somnolent, at times she wakes up.    Neck: Supple, symmetrical, trachea midline, no adenopathy;              thyroid:  no enlargement/tenderness/nodules;              no carotid bruit or JVD  Cardiovascular: Normal rate, regular rhythm and intact distal pulses.              Exam reveals no gallop and no friction rub. No murmur heard  Pulmonary: Diminished breath sounds bilaterally.  Respiration tachypneic.    Abdominal: Soft. Soft, nontender, bowel sounds active all four quadrants,     no masses, no hepatomegaly, no splenomegaly.   Extremities: Left hand contraction.  Left hemiparesis from previous stroke  Neurological: Patient is awake oriented x2.  No new focal sensorimotor deficit.    Skin: Wound in sacral area covered with dressings.        Data Review:    Results from last 7 days   Lab Units 12/17/21  0513 12/15/21  1322 12/14/21  0508   WBC 10*3/mm3 12.97* 15.50* 15.33*   HEMOGLOBIN g/dL 9.5* 9.9* 9.1*   HEMATOCRIT % 27.9* 29.0* 27.3*   PLATELETS 10*3/mm3 297 269 316       Results from last 7 days   Lab Units 12/18/21  1403 12/17/21  0513 12/16/21  2307 12/15/21  2222 12/15/21  1322 12/15/21  1322 12/14/21  0508 12/14/21  0508   SODIUM mmol/L  --  138 137  --   --  137   < > 134*   POTASSIUM mmol/L  --  4.4 4.4 4.4   < > 3.8   < > 5.0   CHLORIDE mmol/L  --  95* 95*  --   --  95*   < > 94*   CO2 mmol/L  --  26.3 25.7  --   --  25.5   < > 23.3   BUN mg/dL  --  43* 41*  --   --  48*   < > 72*   CREATININE mg/dL 2.63* 3.70* 3.36*  --    < > 4.64*   < > 5.91*   CALCIUM mg/dL  --  9.8 9.7  --   --  9.7   < > 9.6   BILIRUBIN mg/dL  --  0.3  --   --   --  0.3  --  0.3   ALK PHOS U/L  --  119*  --   --   " --  117  --  108   ALT (SGPT) U/L  --  13  --   --   --  15  --  19   AST (SGOT) U/L  --  14  --   --   --  15  --  18   GLUCOSE mg/dL  --  208* 243*  --   --  188*   < > 179*    < > = values in this interval not displayed.                 Lab Results   Lab Value Date/Time    TROPONINT 3.390 (C) 12/18/2021 1403    TROPONINT 3.240 (C) 12/16/2021 2307    TROPONINT 3.510 (C) 12/15/2021 2222    TROPONINT 3.810 (C) 12/15/2021 1322    TROPONINT 3.070 (C) 12/14/2021 0508    TROPONINT 1.840 (C) 12/12/2021 0521    TROPONINT 2.020 (C) 12/11/2021 1159    TROPONINT 1.210 (C) 12/10/2021 0456    TROPONINT 0.464 (C) 12/09/2021 1646    TROPONINT 0.037 (C) 02/17/2021 1435    TROPONINT 0.033 (C) 02/17/2021 1216    TROPONINT 0.045 (H) 01/22/2019 1255    TROPONINT 0.052 (H) 01/22/2019 0357    TROPONINT 0.051 (H) 01/21/2019 1108    TROPONINT 0.073 (H) 06/13/2016 0640    TROPONINT 0.02 09/30/2015 0258       Microbiology Results (last 10 days)     Procedure Component Value - Date/Time    CANDIDA AURIS SCREEN - Swab, Axilla Right, Axilla Left and Groin [[id number]]  (Normal) Collected: 12/10/21 0928    Lab Status: Final result Specimen: Swab from Axilla Right, Axilla Left and Groin Updated: 12/15/21 1700     Anabell Auris Screen Culture No Candida auris isolated at 5 days    Blood Culture - Blood, Arm, Right [[id number]]  (Normal) Collected: 12/09/21 2032    Lab Status: Final result Specimen: Blood from Arm, Right Updated: 12/14/21 2045     Blood Culture No growth at 5 days    Blood Culture - Blood, Arm, Left [[id number]]  (Abnormal) Collected: 12/09/21 3903    Lab Status: Final result Specimen: Blood from Arm, Left Updated: 12/16/21 1323     Blood Culture Cutibacterium acnes     Isolated from Anaerobic Bottle     Gram Stain Anaerobic Bottle Gram positive bacilli    Narrative:      Blood culture does not meet the specified criteria for PCR identification.  If pregnant, immunocompromised, or clinical concern for meningitis, call lab to run  BCID for Listeria monocytogenes.    Respiratory Panel PCR w/COVID-19(SARS-CoV-2) CHAYITO/JESSIE/HIMANSHU/PAD/COR/MAD/KEMAR In-House, NP Swab in UTM/VTM, 3-4 HR TAT - Swab, Nasopharynx [[id number]]  (Abnormal) Collected: 12/09/21 1804    Lab Status: Final result Specimen: Swab from Nasopharynx Updated: 12/09/21 1911     ADENOVIRUS, PCR Not Detected     Coronavirus 229E Not Detected     Coronavirus HKU1 Not Detected     Coronavirus NL63 Not Detected     Coronavirus OC43 Not Detected     COVID19 Detected     Human Metapneumovirus Not Detected     Human Rhinovirus/Enterovirus Not Detected     Influenza A PCR Not Detected     Influenza B PCR Not Detected     Parainfluenza Virus 1 Not Detected     Parainfluenza Virus 2 Not Detected     Parainfluenza Virus 3 Not Detected     Parainfluenza Virus 4 Not Detected     RSV, PCR Not Detected     Bordetella pertussis pcr Not Detected     Bordetella parapertussis PCR Not Detected     Chlamydophila pneumoniae PCR Not Detected     Mycoplasma pneumo by PCR Not Detected    Narrative:      In the setting of a positive respiratory panel with a viral infection PLUS a negative procalcitonin without other underlying concern for bacterial infection, consider observing off antibiotics or discontinuation of antibiotics and continue supportive care. If the respiratory panel is positive for atypical bacterial infection (Bordetella pertussis, Chlamydophila pneumoniae, or Mycoplasma pneumoniae), consider antibiotic de-escalation to target atypical bacterial infection.           Imaging Results (Last 7 Days)     Procedure Component Value Units Date/Time    XR Chest 1 View [[id number]] Collected: 12/18/21 1014     Updated: 12/18/21 1019    Narrative:      XR CHEST 1 VW-     HISTORY: Female who is 61 years-old,  hypoxemia     TECHNIQUE: Frontal views of the chest     COMPARISON: 12/09/2021     FINDINGS: Patient is rotated. Heart size appears normal. Aorta is  tortuous. Pulmonary vasculature is unremarkable.  Persistent bilateral  pulmonary opacities appear mildly increased on the right. No pleural  effusion, or pneumothorax. No acute osseous process.       Impression:      Persistent bilateral pulmonary opacities appear mildly  increased on the right.     This report was finalized on 12/18/2021 10:16 AM by Dr. Albert Ruano M.D.               Assessment:        Infected decubitus ulcer    Pneumonia due to COVID-19 virus    Cytokine release syndrome, grade 2  Acute respiratory failure with hypoxia  Volume overload  End-stage renal disease on dialysis  Elevated troponin.  Diabetes mellitus 2  Anemia, chronic kidney disease  Seizure disorder  Coronary artery disease  Hypertension  Hypokalemia        Plan:    Family has decided to change her care structure to palliative care/comfort measures.  She is under palliative care orders, she is comfortable.  Family at bedside, pleased with care  Continue palliative care orders.    Rhys Mary MD  12/19/2021           I wore protective equipment throughout this patient encounter including a face mask, gloves, and protective eyewear.  Hand hygiene was performed before donning protective equipment and after removal when leaving the room.

## 2021-12-20 NOTE — PROGRESS NOTES
Turned Airvo, humid heated high flow cannula, down per pt's comfort level, not sats, per daughter's request.

## 2021-12-20 NOTE — PROGRESS NOTES
"[facility]  Cardiology Progress Note    Patient Identification:  Name: Carolina Bhat  Age: 61 y.o.  Sex: female  :  1960  MRN: [id number]             Patient is palliative, comfort measures only. We will sign off at this time.       )2021  HEYDI Serrano/Transcription:   \"Dictated utilizing Dragon dictation\".     "

## 2021-12-20 NOTE — CONSULTS
Prayer from outside the room for patient. Prayed for comfort and peace for patient as well as family.

## 2021-12-20 NOTE — PROGRESS NOTES
"DAILY PROGRESS NOTE  [facility], [facility]      2021  Patient Identification:  Name: Carolina Bhat  Age: 61 y.o.  Sex: female  :  1960  MRN: [id number]         Primary Care Physician: Provider, No Known    Subjective:  Interval History:    Patient is under palliative care orders/comfort care measures.  Patient is tachycardic, tachypneic. She is however comfortable.  Family at bedside, all pleased with care.      Objective:    Intake/Output:  No intake or output data in the 24 hours ending 21 1256      Exam:    BP (!) 76/38 (BP Location: Left arm, Patient Position: Lying)   Pulse (!) 171   Temp 99.5 °F (37.5 °C) (Axillary)   Resp 28   Ht 175.3 cm (69\")   Wt 89.4 kg (197 lb)   SpO2 (!) 87% Comment: checked with forehead probe  BMI 29.09 kg/m²     General:  Unresponsive  Neck: Supple, symmetrical, trachea midline, no adenopathy;              thyroid:  no enlargement/tenderness/nodules;              no carotid bruit or JVD  Cardiovascular: Normal rate, regular rhythm and intact distal pulses.              Exam reveals no gallop and no friction rub. No murmur heard  Pulmonary: Diminished breath sounds bilaterally.  Respiration tachypneic.    Abdominal: Soft. Soft, nontender, bowel sounds active all four quadrants,     no masses, no hepatomegaly, no splenomegaly.   Extremities: Left hand contraction.  Left hemiparesis from previous stroke  Neurological:  Unresponsive.  No new focal sensorimotor deficit.    Skin: Wound in sacral area covered with dressings.             Assessment:        Infected decubitus ulcer    Pneumonia due to COVID-19 virus    Cytokine release syndrome, grade 2  Acute respiratory failure with hypoxia  Volume overload  End-stage renal disease on dialysis  Elevated troponin.  Diabetes mellitus 2  Anemia, chronic kidney disease  Seizure disorder  Coronary artery disease  Hypertension  Hypokalemia    Plan:    Patient is " comfortable  Will continue palliative care/comfort measures orders only  Will ask hospice to see whether patient qualify for hospice scattered bed.          Rhys Mary MD  12/20/2021  12:56 EST         I wore protective equipment throughout this patient encounter including a face mask, gloves, and protective eyewear.  Hand hygiene was performed before donning protective equipment and after removal when leaving the room.

## 2021-12-20 NOTE — PLAN OF CARE
Goal Outcome Evaluation:  Plan of Care Reviewed With: patient, daughter        Progress: declining  Outcome Summary: Pt given 4 mg of morphine before turns and dressing change. Dressing change completed @ 1000, pt tolerated fairly. Pt received ativan 0.5mg x's 2 this shift for anxiety and agitation. Pt requested that the non-rebreather be taken off, pt still wearing the hi-flow opti-flow cannula. Pt currently resting comfortably, will continue to provide comfort care.

## 2021-12-20 NOTE — PLAN OF CARE
Goal Outcome Evaluation:  Plan of Care Reviewed With: patient        Progress: declining  Outcome Summary: Medicated with morphine prior to turns. Pt moaned and cried out with repositioning. Added ativan with last dose in preperation for dressing change. She tolerated well and now appears comfortable at rest. Shallow tachypneic respirations. Will continue comfort care

## 2021-12-21 NOTE — PROGRESS NOTES
Case Management Discharge Note      Final Note: Pt  21         Selected Continued Care - Discharged on 2021 Admission date: 2021 - Discharge disposition:     Destination Coordination complete.    Service Provider Selected Services Address Phone Fax Patient Preferred    [facility]  Sk[facility] Nursing [address] [address] [phone] --          Durable Medical Equipment    No services have been selected for the patient.              Dialysis/Infusion    No services have been selected for the patient.              Home Medical Care    No services have been selected for the patient.              Therapy    No services have been selected for the patient.              Community Resources    No services have been selected for the patient.              Community & DME    No services have been selected for the patient.                       Final Discharge Disposition Code: 20 -

## 2021-12-21 NOTE — PLAN OF CARE
Goal Outcome Evaluation:  Plan of Care Reviewed With: daughter        Progress: declining  Outcome Summary: Pt medicated with 4mg of morphine x's 3, prior to turns. Optiflow and non rebreather removed per daughter's request. Ativan 0.5mg given x's 1 for anxiousness. Will continue to provide end of life care.

## 2023-04-26 NOTE — PROGRESS NOTES
"[facility]    Patient Name: Carolina Bhat  :1960  61 y.o.  LOS: 7  Encounter Provider: Marty Sorenson Jr, MD      Patient Care Team:  Provider, No Known as PCP - General    Chief Complaint: Follow-up PSVT, COVID-19 pneumonia, NSTEMI, end-stage renal disease    Interval History: Patient had paroxysmal SVT for several hours last night that broke with dose of beta-blocker.  Currently in sinus rhythm.  No angina.  Nonspecific repolarization changes on EKG this morning.       Objective   Vital Signs  Temp:  [97 °F (36.1 °C)-99 °F (37.2 °C)] 97 °F (36.1 °C)  Heart Rate:  [] 90  Resp:  [18-22] 18  BP: ()/(59-83) 99/59    Intake/Output Summary (Last 24 hours) at 2021 1112  Last data filed at 2021 0800  Gross per 24 hour   Intake 180 ml   Output 2000 ml   Net -1820 ml     Flowsheet Rows      First Filed Value   Admission Height 177 cm (69.69\") Documented at 2021 2320   Admission Weight 85.3 kg (188 lb) Documented at 2021 2320            Physical Exam  Case discussed in detail with RN staff.  Digital records reviewed in detail.  Physical exam deferred secondary to COVID-19 infection.    Pertinent Test Results:  Results from last 7 days   Lab Units 12/15/21  2222 12/15/21  1322 21  0508 21  0804 21  0521 21  1159 12/10/21  0456 21  1802 21  1802   SODIUM mmol/L  --  137 134* 137 137 139 135*  --  137   POTASSIUM mmol/L 4.4 3.8 5.0 4.6 3.4* 3.5 4.2   < > 5.0   CHLORIDE mmol/L  --  95* 94* 97* 97* 97* 97*  --  97*   CO2 mmol/L  --  25.5 23.3 22.2 23.5 26.2 19.3*  --  24.9   BUN mg/dL  --  48* 72* 61* 44* 32* 58*  --  50*   CREATININE mg/dL  --  4.64* 5.91* 5.67* 4.55* 3.67* 6.12*  --  5.79*   GLUCOSE mg/dL  --  188* 179* 200* 238* 156* 197*  --  165*   CALCIUM mg/dL  --  9.7 9.6 9.7 9.7 9.5 9.3  --  9.4   AST (SGOT) U/L  --  15 18 25 33* 56* 73*  --  75*   ALT (SGPT) U/L  --  15 19 21 20 26 26  --  31    < > = values in " Patient with anemia in the setting of ESRD. Hemoglobin below target range. 5k Epo with HD. Monitor hemoglobin. Nephrology on board  Patient to be dialyzed  Plan for maintenance HD on 4/26 Nephrology on board  Patient to be dialyzed accordingly -- 4/20 and 4/21 this interval not displayed.     Results from last 7 days   Lab Units 12/15/21  2222 12/15/21  1322 12/14/21  0508 12/12/21  0521 12/11/21  1159 12/10/21  0456 12/09/21  1646   TROPONIN T ng/mL 3.510* 3.810* 3.070* 1.840* 2.020* 1.210* 0.464*     Results from last 7 days   Lab Units 12/15/21  1322 12/14/21  0508 12/13/21  0804 12/12/21  0521 12/11/21  1159 12/10/21  0456 12/09/21  1646   WBC 10*3/mm3 15.50* 15.33* 13.16* 8.73 7.99 6.55 6.10   HEMOGLOBIN g/dL 9.9* 9.1* 9.9* 10.0* 9.1* 8.7* 10.3*   HEMATOCRIT % 29.0* 27.3* 30.0* 30.5* 26.4* 25.7* 32.0*   PLATELETS 10*3/mm3 269 316 291 303 296 265 336         Results from last 7 days   Lab Units 12/15/21  2222   MAGNESIUM mg/dL 2.2           Invalid input(s): LDLCALC  Results from last 7 days   Lab Units 12/09/21  1646   PROBNP pg/mL 11,613.0*               Medication Review:   atorvastatin, 60 mg, Oral, Nightly  carvedilol, 12.5 mg, Oral, BID With Meals  dexamethasone, 6 mg, Intravenous, Q24H  famotidine, 20 mg, Oral, Daily  heparin (porcine), 5,000 Units, Subcutaneous, Q12H  insulin glargine, 10 Units, Subcutaneous, Nightly  insulin lispro, 0-7 Units, Subcutaneous, TID AC  levETIRAcetam, 500 mg, Oral, BID  sodium hypochlorite, , Topical, Q12H              Assessment/Plan   1. PSVT -patient was in SVT last night which appears to be atrial tachycardia.  Continue Coreg.  Blood pressure is soft without room for up titration at this time.  As needed IV beta-blocker is okay to use.  Patient has decrease in troponin level overnight.  Dr. Roach is planning for ischemic work-up once pulmonary infection is optimized.  2. COVID-19 pneumonia  3. ESRD on hemodialysis  4. Diabetes  5. NSTEMI -start aspirin.  Continue beta-blocker and statin.    Marty Sorenson Jr, MD  [facility]  12/16/21  11:12 EST       Nephrology on board  Patient to be dialyzed accordingly -- 4/20 and 4/21 Patient with anemia in the setting of ESRD. Hemoglobin below target range. 5k Epo with HD. Monitor hemoglobin. Nephrology on board  Patient to be dialyzed  Plan for maintenance HD on 4/24 Nephrology on board  Patient to be dialyzed  Plan for maintenance HD on 4/26 Patient with anemia in the setting of ESRD. Hemoglobin below target range. 5k Epo with HD. Blood transfusion per primary team. Monitor hemoglobin. Patient with anemia in the setting of ESRD. Hemoglobin below target range. 5k Epo with HD. Blood transfusion per primary team. Monitor hemoglobin. Nephrology on board  Patient to be dialyzed accordingly -- 4/20 and 4/21

## (undated) DEVICE — ANTIBACTERIAL UNDYED BRAIDED (POLYGLACTIN 910), SYNTHETIC ABSORBABLE SUTURE: Brand: COATED VICRYL

## (undated) DEVICE — PENCL E/S ULTRAVAC TELESCP NOSE HOLSTR 10FT

## (undated) DEVICE — LOU MINOR PROCEDURE: Brand: MEDLINE INDUSTRIES, INC.

## (undated) DEVICE — NDL HYPO PRECISIONGLIDE REG 25G 1 1/2

## (undated) DEVICE — TRAP FLD MINIVAC MEGADYNE 100ML

## (undated) DEVICE — SUT MNCRYL PLS ANTIB UD 4/0 PS2 18IN

## (undated) DEVICE — PATIENT RETURN ELECTRODE, SINGLE-USE, CONTACT QUALITY MONITORING, ADULT, WITH 9FT CORD, FOR PATIENTS WEIGING OVER 33LBS. (15KG): Brand: MEGADYNE

## (undated) DEVICE — GLV SURG BIOGEL LTX PF 8 1/2

## (undated) DEVICE — APPL CHLORAPREP HI/LITE 26ML ORNG